# Patient Record
Sex: MALE | Race: OTHER | HISPANIC OR LATINO
[De-identification: names, ages, dates, MRNs, and addresses within clinical notes are randomized per-mention and may not be internally consistent; named-entity substitution may affect disease eponyms.]

---

## 2021-04-12 ENCOUNTER — APPOINTMENT (OUTPATIENT)
Dept: DISASTER EMERGENCY | Facility: OTHER | Age: 81
End: 2021-04-12
Payer: MEDICARE

## 2021-04-12 PROCEDURE — 0012A: CPT

## 2022-05-14 ENCOUNTER — EMERGENCY (EMERGENCY)
Facility: HOSPITAL | Age: 82
LOS: 1 days | Discharge: HOME | End: 2022-05-14
Attending: EMERGENCY MEDICINE | Admitting: EMERGENCY MEDICINE
Payer: MEDICARE

## 2022-05-14 VITALS — OXYGEN SATURATION: 99 % | RESPIRATION RATE: 19 BRPM | HEART RATE: 75 BPM

## 2022-05-14 VITALS
OXYGEN SATURATION: 98 % | HEIGHT: 71 IN | TEMPERATURE: 98 F | WEIGHT: 169.98 LBS | HEART RATE: 81 BPM | RESPIRATION RATE: 20 BRPM | SYSTOLIC BLOOD PRESSURE: 130 MMHG | DIASTOLIC BLOOD PRESSURE: 82 MMHG

## 2022-05-14 PROCEDURE — 99283 EMERGENCY DEPT VISIT LOW MDM: CPT

## 2022-05-14 RX ORDER — ALBUTEROL 90 UG/1
2 AEROSOL, METERED ORAL
Qty: 1 | Refills: 0
Start: 2022-05-14

## 2022-05-14 NOTE — ED ADULT TRIAGE NOTE - ISOLATION TYPE:
Airborne+Contact precautions/Droplet+Contact precautions Rifampin Pregnancy And Lactation Text: This medication is Pregnancy Category C and it isn't know if it is safe during pregnancy. It is also excreted in breast milk and should not be used if you are breast feeding.

## 2022-05-14 NOTE — ED PROVIDER NOTE - ATTENDING CONTRIBUTION TO CARE
Patient with Parkinson's, htn, dm, Paxlovid, here 2/2 wheezing and ran out of albuterol  will give albuterol  The patient was serially evaluated throughout emergency department course by the team. There was no acute deterioration up to this time in the emergency department. The patient has demonstrated clinical improvement and/or stability, feels better at this time according to emergency department team. Agree with goals/plan of emergency department care as described in this physician's electronic medical record, including diagnostics, therapeutics and consultation recommendation as clinically warranted. Will discharge home with close outpatient follow up with primary care physician/provider and specialist if necessary. The patient and/or family was educated on concerning signs and features to return to the emergency department, in layman terms, including but not limited to: nausea, vomiting, fever, chills, the inability to eat, take medications, or drink, persistent/worsening symptoms or any concerns at all. There are no acute or immediate life threatening issues present on history, clinical exam, or any diagnostic evaluation. The patient is a safe disposition home, has capacity and insight into their condition, is ambulatory in the Emergency Department with no further questions and will follow up with their doctor(s) this week. Diagnosis, prognosis, natural history and treatment was discussed with patient and/or family. The patient and/or family were given the opportunity to ask questions and have them answered in full. The patient and/or family are with capacity and insight into the situation, treatment, risks, benefits, alternative therapies, and understand that they can ask any further questions if needed. Patient and/or family/guardian understands anticipatory guidance and was given strict return and follow up precautions. The patient and/or family/guardian has been informed of the necessity to follow up with the PMD/Clinic/follow up as provided within 2-3 days, and the patient and/or family/guardian reports understanding of above with capacity and insight. The patient and/or family/guardian were informed of any results of their tests and are were encouraged to follow up on the findings with their doctor as well as the need to inform their doctor of any results. The patient and/or family/guardian are aware of the need to follow up with repeat testing as applicable and report understanding of the above with capacity and insight. The patient and/or family/guardian was made aware of any pending test results at the time of discharge and of the need to call back for the final results a well as the need to inform their doctor of the results.

## 2022-05-14 NOTE — ED ADULT NURSE NOTE - OBJECTIVE STATEMENT
82 yo M with pmhx of HTN, DM, Parkinson's, HLD recent dx with Covid prescribed Paxlovid on the last day, presents to the ED for increased SOB since Tuesday. Pt is a&ox4, in NAD on arrival. afebrile, sp02 98% on RA, respirations equal, regular, spontaneous.  family is at bedside translating, (also Covid+) denies chest pain, headache, nausea, vomiting, diarrhea, abdominal pain, fevers, chills, urinary symptoms, falls.

## 2022-05-14 NOTE — ED PROVIDER NOTE - NSFOLLOWUPINSTRUCTIONS_ED_ALL_ED_FT
Please take albuterol inhaler as instructed for wheeze. Complete course of paxlovid. Follow up with your PCP before resuming home medications such as your home simvastatin and lisinopril for definitive management.

## 2022-05-14 NOTE — ED PROVIDER NOTE - NS ED ROS FT
REVIEW OF SYSTEMS:    CONSTITUTIONAL: No weakness, fevers or chills  RESPIRATORY: +cough   CARDIOVASCULAR: No chest pain or palpitations  GASTROINTESTINAL: No abdominal or epigastric pain. No nausea, vomiting, or hematemesis; No diarrhea or constipation. No melena or hematochezia.  GENITOURINARY: No dysuria, frequency or hematuria  All other review of systems is negative unless indicated above.

## 2022-05-14 NOTE — ED PROVIDER NOTE - PATIENT PORTAL LINK FT
You can access the FollowMyHealth Patient Portal offered by [facility] by registering at the following website: [email or web address]. By joining [employer]’s FollowMyHealth portal, you will also be able to view your health information using other applications (apps) compatible with our system.

## 2022-05-14 NOTE — ED PROVIDER NOTE - PHYSICAL EXAMINATION
GENERAL: patient appears well, no acute distress, appropriate, pleasant  ENMT: oropharynx clear without erythema, no exudates, moist mucous membranes  LUNGS: good air entry bilaterally, clear to auscultation, symmetric breath sounds, no wheezing or rhonchi appreciated  HEART: soft S1/S2, regular rate and rhythm, no murmurs noted, no lower extremity edema  GASTROINTESTINAL: abdomen is soft, nontender, nondistended, normoactive bowel sounds, no palpable masses  NEUROLOGIC: awake, alert, oriented x3

## 2022-05-14 NOTE — ED PROVIDER NOTE - OBJECTIVE STATEMENT
81y M pmhx HTN HLD DM COVID infxn presenting for SOB and wheezing 5/14 AM on paxlovid to complete course 5/15. Eli 81y M pmhx HTN HLD DM COVID infxn presenting for SOB and wheezing 5/14 AM on paxlovid to complete course 5/15. Eli son provided translation at bedside. Interval improvement of wheezing and SOB upon arrival to ED. home pulse ox 98% on 5/13 and 5/14. Denies fever chills sweats no chest pain palp no pleuritic pain SOB improved with one episode cough while in ED. No other concerns.

## 2023-03-29 ENCOUNTER — EMERGENCY (EMERGENCY)
Facility: HOSPITAL | Age: 83
LOS: 1 days | Discharge: HOME | End: 2023-03-29
Attending: EMERGENCY MEDICINE
Payer: MEDICARE

## 2023-03-29 VITALS
TEMPERATURE: 98 F | WEIGHT: 179.9 LBS | DIASTOLIC BLOOD PRESSURE: 86 MMHG | HEIGHT: 66 IN | RESPIRATION RATE: 18 BRPM | HEART RATE: 86 BPM | SYSTOLIC BLOOD PRESSURE: 147 MMHG | OXYGEN SATURATION: 98 %

## 2023-03-29 PROCEDURE — 99284 EMERGENCY DEPT VISIT MOD MDM: CPT | Mod: GC

## 2023-03-30 PROBLEM — G20 PARKINSON'S DISEASE: Chronic | Status: ACTIVE | Noted: 2022-05-14

## 2023-03-30 PROCEDURE — 85025 COMPLETE CBC W/AUTO DIFF WBC: CPT

## 2023-03-30 PROCEDURE — 99285 EMERGENCY DEPT VISIT HI MDM: CPT | Mod: 25

## 2023-03-30 PROCEDURE — 93975 VASCULAR STUDY: CPT

## 2023-03-30 PROCEDURE — 81001 URINALYSIS AUTO W/SCOPE: CPT

## 2023-03-30 PROCEDURE — 76870 US EXAM SCROTUM: CPT

## 2023-03-30 PROCEDURE — 80053 COMPREHEN METABOLIC PANEL: CPT

## 2023-03-30 PROCEDURE — 76870 US EXAM SCROTUM: CPT | Mod: 26

## 2023-03-30 PROCEDURE — 87186 SC STD MICRODIL/AGAR DIL: CPT

## 2023-03-30 PROCEDURE — 93975 VASCULAR STUDY: CPT | Mod: 26

## 2023-03-30 PROCEDURE — 87086 URINE CULTURE/COLONY COUNT: CPT

## 2023-04-01 NOTE — ED POST DISCHARGE NOTE - DETAILS
4/1: patient with epididymitis, positive UA, unclear what abx taken. lvm to call back admin line. will continue to follow final sensitivities - Evelia Frederick PA-C 4/2 - Carmina Bentley PA-C 4/3: LVM for pt to return call to ED - Miriam Jay PA-C

## 2024-05-21 ENCOUNTER — INPATIENT (INPATIENT)
Facility: HOSPITAL | Age: 84
LOS: 6 days | Discharge: NURSING, REHAB OR LONG-TERM CARE | DRG: 871 | End: 2024-05-28
Attending: HOSPITALIST | Admitting: STUDENT IN AN ORGANIZED HEALTH CARE EDUCATION/TRAINING PROGRAM
Payer: MEDICARE

## 2024-05-21 VITALS
SYSTOLIC BLOOD PRESSURE: 146 MMHG | TEMPERATURE: 102 F | HEART RATE: 110 BPM | RESPIRATION RATE: 22 BRPM | DIASTOLIC BLOOD PRESSURE: 76 MMHG | OXYGEN SATURATION: 97 % | HEIGHT: 67 IN | WEIGHT: 179.9 LBS

## 2024-05-21 PROCEDURE — 99285 EMERGENCY DEPT VISIT HI MDM: CPT | Mod: GC

## 2024-05-21 RX ORDER — ACETAMINOPHEN 500 MG
1000 TABLET ORAL ONCE
Refills: 0 | Status: COMPLETED | OUTPATIENT
Start: 2024-05-21 | End: 2024-05-21

## 2024-05-21 RX ORDER — SODIUM CHLORIDE 9 MG/ML
1000 INJECTION INTRAMUSCULAR; INTRAVENOUS; SUBCUTANEOUS ONCE
Refills: 0 | Status: COMPLETED | OUTPATIENT
Start: 2024-05-21 | End: 2024-05-21

## 2024-05-22 DIAGNOSIS — N39.0 URINARY TRACT INFECTION, SITE NOT SPECIFIED: ICD-10-CM

## 2024-05-22 DIAGNOSIS — Z86.69 PERSONAL HISTORY OF OTHER DISEASES OF THE NERVOUS SYSTEM AND SENSE ORGANS: ICD-10-CM

## 2024-05-22 DIAGNOSIS — Z96.21 COCHLEAR IMPLANT STATUS: Chronic | ICD-10-CM

## 2024-05-22 DIAGNOSIS — I10 ESSENTIAL (PRIMARY) HYPERTENSION: ICD-10-CM

## 2024-05-22 DIAGNOSIS — Z29.9 ENCOUNTER FOR PROPHYLACTIC MEASURES, UNSPECIFIED: ICD-10-CM

## 2024-05-22 DIAGNOSIS — E11.9 TYPE 2 DIABETES MELLITUS WITHOUT COMPLICATIONS: ICD-10-CM

## 2024-05-22 DIAGNOSIS — A41.9 SEPSIS, UNSPECIFIED ORGANISM: ICD-10-CM

## 2024-05-22 LAB
ALBUMIN SERPL ELPH-MCNC: 4.2 G/DL — SIGNIFICANT CHANGE UP (ref 3.3–5)
ALP SERPL-CCNC: 110 U/L — SIGNIFICANT CHANGE UP (ref 40–120)
ALT FLD-CCNC: 6 U/L — LOW (ref 10–45)
ANION GAP SERPL CALC-SCNC: 14 MMOL/L — SIGNIFICANT CHANGE UP (ref 5–17)
APPEARANCE UR: ABNORMAL
APTT BLD: 27.9 SEC — SIGNIFICANT CHANGE UP (ref 24.5–35.6)
AST SERPL-CCNC: 11 U/L — SIGNIFICANT CHANGE UP (ref 10–40)
BACTERIA # UR AUTO: ABNORMAL /HPF
BASE EXCESS BLDV CALC-SCNC: -0.2 MMOL/L — SIGNIFICANT CHANGE UP (ref -2–3)
BASE EXCESS BLDV CALC-SCNC: -1.7 MMOL/L — SIGNIFICANT CHANGE UP (ref -2–3)
BASOPHILS # BLD AUTO: 0.08 K/UL — SIGNIFICANT CHANGE UP (ref 0–0.2)
BASOPHILS NFR BLD AUTO: 0.4 % — SIGNIFICANT CHANGE UP (ref 0–2)
BILIRUB SERPL-MCNC: 0.6 MG/DL — SIGNIFICANT CHANGE UP (ref 0.2–1.2)
BILIRUB UR-MCNC: NEGATIVE — SIGNIFICANT CHANGE UP
BUN SERPL-MCNC: 22 MG/DL — SIGNIFICANT CHANGE UP (ref 7–23)
CA-I SERPL-SCNC: 1.21 MMOL/L — SIGNIFICANT CHANGE UP (ref 1.15–1.33)
CA-I SERPL-SCNC: 1.24 MMOL/L — SIGNIFICANT CHANGE UP (ref 1.15–1.33)
CALCIUM SERPL-MCNC: 9.7 MG/DL — SIGNIFICANT CHANGE UP (ref 8.4–10.5)
CAST: 2 /LPF — SIGNIFICANT CHANGE UP (ref 0–4)
CHLORIDE BLDV-SCNC: 102 MMOL/L — SIGNIFICANT CHANGE UP (ref 96–108)
CHLORIDE BLDV-SCNC: 99 MMOL/L — SIGNIFICANT CHANGE UP (ref 96–108)
CHLORIDE SERPL-SCNC: 99 MMOL/L — SIGNIFICANT CHANGE UP (ref 96–108)
CO2 BLDV-SCNC: 24 MMOL/L — SIGNIFICANT CHANGE UP (ref 22–26)
CO2 BLDV-SCNC: 27 MMOL/L — HIGH (ref 22–26)
CO2 SERPL-SCNC: 24 MMOL/L — SIGNIFICANT CHANGE UP (ref 22–31)
COLOR SPEC: YELLOW — SIGNIFICANT CHANGE UP
CREAT SERPL-MCNC: 1.21 MG/DL — SIGNIFICANT CHANGE UP (ref 0.5–1.3)
DIFF PNL FLD: ABNORMAL
EGFR: 59 ML/MIN/1.73M2 — LOW
EOSINOPHIL # BLD AUTO: 0.04 K/UL — SIGNIFICANT CHANGE UP (ref 0–0.5)
EOSINOPHIL NFR BLD AUTO: 0.2 % — SIGNIFICANT CHANGE UP (ref 0–6)
GAS PNL BLDV: 131 MMOL/L — LOW (ref 136–145)
GAS PNL BLDV: 132 MMOL/L — LOW (ref 136–145)
GAS PNL BLDV: SIGNIFICANT CHANGE UP
GAS PNL BLDV: SIGNIFICANT CHANGE UP
GLUCOSE BLDC GLUCOMTR-MCNC: 103 MG/DL — HIGH (ref 70–99)
GLUCOSE BLDC GLUCOMTR-MCNC: 109 MG/DL — HIGH (ref 70–99)
GLUCOSE BLDV-MCNC: 121 MG/DL — HIGH (ref 70–99)
GLUCOSE BLDV-MCNC: 136 MG/DL — HIGH (ref 70–99)
GLUCOSE SERPL-MCNC: 124 MG/DL — HIGH (ref 70–99)
GLUCOSE UR QL: NEGATIVE MG/DL — SIGNIFICANT CHANGE UP
HCO3 BLDV-SCNC: 23 MMOL/L — SIGNIFICANT CHANGE UP (ref 22–29)
HCO3 BLDV-SCNC: 25 MMOL/L — SIGNIFICANT CHANGE UP (ref 22–29)
HCT VFR BLD CALC: 38.5 % — LOW (ref 39–50)
HCT VFR BLDA CALC: 33 % — LOW (ref 39–51)
HCT VFR BLDA CALC: 40 % — SIGNIFICANT CHANGE UP (ref 39–51)
HGB BLD CALC-MCNC: 11 G/DL — LOW (ref 12.6–17.4)
HGB BLD CALC-MCNC: 13.2 G/DL — SIGNIFICANT CHANGE UP (ref 12.6–17.4)
HGB BLD-MCNC: 12.7 G/DL — LOW (ref 13–17)
IMM GRANULOCYTES NFR BLD AUTO: 0.9 % — SIGNIFICANT CHANGE UP (ref 0–0.9)
INR BLD: 1.07 RATIO — SIGNIFICANT CHANGE UP (ref 0.85–1.18)
KETONES UR-MCNC: ABNORMAL MG/DL
LACTATE BLDV-MCNC: 1.3 MMOL/L — SIGNIFICANT CHANGE UP (ref 0.5–2)
LACTATE BLDV-MCNC: 2.6 MMOL/L — HIGH (ref 0.5–2)
LEUKOCYTE ESTERASE UR-ACNC: ABNORMAL
LYMPHOCYTES # BLD AUTO: 1.25 K/UL — SIGNIFICANT CHANGE UP (ref 1–3.3)
LYMPHOCYTES # BLD AUTO: 6.7 % — LOW (ref 13–44)
MCHC RBC-ENTMCNC: 30.6 PG — SIGNIFICANT CHANGE UP (ref 27–34)
MCHC RBC-ENTMCNC: 33 GM/DL — SIGNIFICANT CHANGE UP (ref 32–36)
MCV RBC AUTO: 92.8 FL — SIGNIFICANT CHANGE UP (ref 80–100)
MONOCYTES # BLD AUTO: 1.7 K/UL — HIGH (ref 0–0.9)
MONOCYTES NFR BLD AUTO: 9.1 % — SIGNIFICANT CHANGE UP (ref 2–14)
NEUTROPHILS # BLD AUTO: 15.53 K/UL — HIGH (ref 1.8–7.4)
NEUTROPHILS NFR BLD AUTO: 82.7 % — HIGH (ref 43–77)
NITRITE UR-MCNC: POSITIVE
NRBC # BLD: 0 /100 WBCS — SIGNIFICANT CHANGE UP (ref 0–0)
PCO2 BLDV: 38 MMHG — LOW (ref 42–55)
PCO2 BLDV: 44 MMHG — SIGNIFICANT CHANGE UP (ref 42–55)
PH BLDV: 7.37 — SIGNIFICANT CHANGE UP (ref 7.32–7.43)
PH BLDV: 7.39 — SIGNIFICANT CHANGE UP (ref 7.32–7.43)
PH UR: 7 — SIGNIFICANT CHANGE UP (ref 5–8)
PLATELET # BLD AUTO: 158 K/UL — SIGNIFICANT CHANGE UP (ref 150–400)
PO2 BLDV: 22 MMHG — LOW (ref 25–45)
PO2 BLDV: 43 MMHG — SIGNIFICANT CHANGE UP (ref 25–45)
POTASSIUM BLDV-SCNC: 3.7 MMOL/L — SIGNIFICANT CHANGE UP (ref 3.5–5.1)
POTASSIUM BLDV-SCNC: 4 MMOL/L — SIGNIFICANT CHANGE UP (ref 3.5–5.1)
POTASSIUM SERPL-MCNC: 3.9 MMOL/L — SIGNIFICANT CHANGE UP (ref 3.5–5.3)
POTASSIUM SERPL-SCNC: 3.9 MMOL/L — SIGNIFICANT CHANGE UP (ref 3.5–5.3)
PROT SERPL-MCNC: 7.8 G/DL — SIGNIFICANT CHANGE UP (ref 6–8.3)
PROT UR-MCNC: 30 MG/DL
PROTHROM AB SERPL-ACNC: 11.7 SEC — SIGNIFICANT CHANGE UP (ref 9.5–13)
RBC # BLD: 4.15 M/UL — LOW (ref 4.2–5.8)
RBC # FLD: 13.9 % — SIGNIFICANT CHANGE UP (ref 10.3–14.5)
RBC CASTS # UR COMP ASSIST: 2 /HPF — SIGNIFICANT CHANGE UP (ref 0–4)
SAO2 % BLDV: 32 % — LOW (ref 67–88)
SAO2 % BLDV: 75.3 % — SIGNIFICANT CHANGE UP (ref 67–88)
SODIUM SERPL-SCNC: 137 MMOL/L — SIGNIFICANT CHANGE UP (ref 135–145)
SP GR SPEC: 1.02 — SIGNIFICANT CHANGE UP (ref 1–1.03)
SQUAMOUS # UR AUTO: 1 /HPF — SIGNIFICANT CHANGE UP (ref 0–5)
UROBILINOGEN FLD QL: 1 MG/DL — SIGNIFICANT CHANGE UP (ref 0.2–1)
WBC # BLD: 18.76 K/UL — HIGH (ref 3.8–10.5)
WBC # FLD AUTO: 18.76 K/UL — HIGH (ref 3.8–10.5)
WBC UR QL: 195 /HPF — HIGH (ref 0–5)

## 2024-05-22 PROCEDURE — 99223 1ST HOSP IP/OBS HIGH 75: CPT

## 2024-05-22 PROCEDURE — 74177 CT ABD & PELVIS W/CONTRAST: CPT | Mod: 26,MC

## 2024-05-22 PROCEDURE — 93010 ELECTROCARDIOGRAM REPORT: CPT

## 2024-05-22 PROCEDURE — 70450 CT HEAD/BRAIN W/O DYE: CPT | Mod: 26,MC

## 2024-05-22 RX ORDER — METFORMIN HYDROCHLORIDE 850 MG/1
1 TABLET ORAL
Refills: 0 | DISCHARGE

## 2024-05-22 RX ORDER — SODIUM CHLORIDE 9 MG/ML
1000 INJECTION INTRAMUSCULAR; INTRAVENOUS; SUBCUTANEOUS ONCE
Refills: 0 | Status: COMPLETED | OUTPATIENT
Start: 2024-05-22 | End: 2024-05-22

## 2024-05-22 RX ORDER — DEXTROSE 10 % IN WATER 10 %
125 INTRAVENOUS SOLUTION INTRAVENOUS ONCE
Refills: 0 | Status: DISCONTINUED | OUTPATIENT
Start: 2024-05-22 | End: 2024-05-28

## 2024-05-22 RX ORDER — PIPERACILLIN AND TAZOBACTAM 4; .5 G/20ML; G/20ML
3.38 INJECTION, POWDER, LYOPHILIZED, FOR SOLUTION INTRAVENOUS EVERY 8 HOURS
Refills: 0 | Status: DISCONTINUED | OUTPATIENT
Start: 2024-05-22 | End: 2024-05-24

## 2024-05-22 RX ORDER — GLUCAGON INJECTION, SOLUTION 0.5 MG/.1ML
1 INJECTION, SOLUTION SUBCUTANEOUS ONCE
Refills: 0 | Status: DISCONTINUED | OUTPATIENT
Start: 2024-05-22 | End: 2024-05-28

## 2024-05-22 RX ORDER — ONDANSETRON 8 MG/1
4 TABLET, FILM COATED ORAL EVERY 8 HOURS
Refills: 0 | Status: DISCONTINUED | OUTPATIENT
Start: 2024-05-22 | End: 2024-05-28

## 2024-05-22 RX ORDER — DORZOLAMIDE HYDROCHLORIDE TIMOLOL MALEATE 20; 5 MG/ML; MG/ML
1 SOLUTION/ DROPS OPHTHALMIC
Refills: 0 | DISCHARGE

## 2024-05-22 RX ORDER — LISINOPRIL 2.5 MG/1
1 TABLET ORAL
Refills: 0 | DISCHARGE

## 2024-05-22 RX ORDER — SIMVASTATIN 20 MG/1
40 TABLET, FILM COATED ORAL AT BEDTIME
Refills: 0 | Status: DISCONTINUED | OUTPATIENT
Start: 2024-05-22 | End: 2024-05-28

## 2024-05-22 RX ORDER — LATANOPROST 0.05 MG/ML
1 SOLUTION/ DROPS OPHTHALMIC; TOPICAL AT BEDTIME
Refills: 0 | Status: DISCONTINUED | OUTPATIENT
Start: 2024-05-22 | End: 2024-05-28

## 2024-05-22 RX ORDER — PIPERACILLIN AND TAZOBACTAM 4; .5 G/20ML; G/20ML
3.38 INJECTION, POWDER, LYOPHILIZED, FOR SOLUTION INTRAVENOUS ONCE
Refills: 0 | Status: COMPLETED | OUTPATIENT
Start: 2024-05-22 | End: 2024-05-22

## 2024-05-22 RX ORDER — ACETAMINOPHEN 500 MG
650 TABLET ORAL EVERY 6 HOURS
Refills: 0 | Status: DISCONTINUED | OUTPATIENT
Start: 2024-05-22 | End: 2024-05-28

## 2024-05-22 RX ORDER — TAMSULOSIN HYDROCHLORIDE 0.4 MG/1
1 CAPSULE ORAL
Refills: 0 | DISCHARGE

## 2024-05-22 RX ORDER — INSULIN LISPRO 100/ML
VIAL (ML) SUBCUTANEOUS AT BEDTIME
Refills: 0 | Status: DISCONTINUED | OUTPATIENT
Start: 2024-05-22 | End: 2024-05-28

## 2024-05-22 RX ORDER — TAMSULOSIN HYDROCHLORIDE 0.4 MG/1
0.4 CAPSULE ORAL AT BEDTIME
Refills: 0 | Status: DISCONTINUED | OUTPATIENT
Start: 2024-05-22 | End: 2024-05-28

## 2024-05-22 RX ORDER — DEXTROSE 50 % IN WATER 50 %
15 SYRINGE (ML) INTRAVENOUS ONCE
Refills: 0 | Status: DISCONTINUED | OUTPATIENT
Start: 2024-05-22 | End: 2024-05-28

## 2024-05-22 RX ORDER — DORZOLAMIDE HYDROCHLORIDE TIMOLOL MALEATE 20; 5 MG/ML; MG/ML
1 SOLUTION/ DROPS OPHTHALMIC
Refills: 0 | Status: DISCONTINUED | OUTPATIENT
Start: 2024-05-22 | End: 2024-05-28

## 2024-05-22 RX ORDER — DEXTROSE 50 % IN WATER 50 %
25 SYRINGE (ML) INTRAVENOUS ONCE
Refills: 0 | Status: DISCONTINUED | OUTPATIENT
Start: 2024-05-22 | End: 2024-05-28

## 2024-05-22 RX ORDER — INSULIN LISPRO 100/ML
VIAL (ML) SUBCUTANEOUS
Refills: 0 | Status: DISCONTINUED | OUTPATIENT
Start: 2024-05-22 | End: 2024-05-28

## 2024-05-22 RX ORDER — SIMVASTATIN 20 MG/1
1 TABLET, FILM COATED ORAL
Refills: 0 | DISCHARGE

## 2024-05-22 RX ORDER — PIPERACILLIN AND TAZOBACTAM 4; .5 G/20ML; G/20ML
3.38 INJECTION, POWDER, LYOPHILIZED, FOR SOLUTION INTRAVENOUS EVERY 8 HOURS
Refills: 0 | Status: DISCONTINUED | OUTPATIENT
Start: 2024-05-22 | End: 2024-05-22

## 2024-05-22 RX ORDER — VANCOMYCIN HCL 1 G
1000 VIAL (EA) INTRAVENOUS ONCE
Refills: 0 | Status: COMPLETED | OUTPATIENT
Start: 2024-05-22 | End: 2024-05-22

## 2024-05-22 RX ORDER — POLYETHYLENE GLYCOL 3350 17 G/17G
17 POWDER, FOR SOLUTION ORAL DAILY
Refills: 0 | Status: DISCONTINUED | OUTPATIENT
Start: 2024-05-22 | End: 2024-05-28

## 2024-05-22 RX ORDER — SODIUM CHLORIDE 9 MG/ML
1000 INJECTION, SOLUTION INTRAVENOUS
Refills: 0 | Status: DISCONTINUED | OUTPATIENT
Start: 2024-05-22 | End: 2024-05-28

## 2024-05-22 RX ORDER — TRAZODONE HCL 50 MG
50 TABLET ORAL AT BEDTIME
Refills: 0 | Status: DISCONTINUED | OUTPATIENT
Start: 2024-05-22 | End: 2024-05-28

## 2024-05-22 RX ORDER — LANOLIN ALCOHOL/MO/W.PET/CERES
3 CREAM (GRAM) TOPICAL AT BEDTIME
Refills: 0 | Status: DISCONTINUED | OUTPATIENT
Start: 2024-05-22 | End: 2024-05-28

## 2024-05-22 RX ORDER — CARBIDOPA AND LEVODOPA 25; 100 MG/1; MG/1
1 TABLET ORAL
Refills: 0 | DISCHARGE

## 2024-05-22 RX ORDER — CARBIDOPA AND LEVODOPA 25; 100 MG/1; MG/1
1 TABLET ORAL THREE TIMES A DAY
Refills: 0 | Status: DISCONTINUED | OUTPATIENT
Start: 2024-05-22 | End: 2024-05-28

## 2024-05-22 RX ORDER — DEXTROSE 50 % IN WATER 50 %
12.5 SYRINGE (ML) INTRAVENOUS ONCE
Refills: 0 | Status: DISCONTINUED | OUTPATIENT
Start: 2024-05-22 | End: 2024-05-28

## 2024-05-22 RX ORDER — TRAZODONE HCL 50 MG
1 TABLET ORAL
Refills: 0 | DISCHARGE

## 2024-05-22 RX ORDER — LATANOPROST 0.05 MG/ML
1 SOLUTION/ DROPS OPHTHALMIC; TOPICAL
Refills: 0 | DISCHARGE

## 2024-05-22 RX ADMIN — Medication 400 MILLIGRAM(S): at 00:37

## 2024-05-22 RX ADMIN — PIPERACILLIN AND TAZOBACTAM 25 GRAM(S): 4; .5 INJECTION, POWDER, LYOPHILIZED, FOR SOLUTION INTRAVENOUS at 22:24

## 2024-05-22 RX ADMIN — TAMSULOSIN HYDROCHLORIDE 0.4 MILLIGRAM(S): 0.4 CAPSULE ORAL at 22:22

## 2024-05-22 RX ADMIN — Medication 250 MILLIGRAM(S): at 03:05

## 2024-05-22 RX ADMIN — Medication 650 MILLIGRAM(S): at 16:25

## 2024-05-22 RX ADMIN — PIPERACILLIN AND TAZOBACTAM 200 GRAM(S): 4; .5 INJECTION, POWDER, LYOPHILIZED, FOR SOLUTION INTRAVENOUS at 01:21

## 2024-05-22 RX ADMIN — SODIUM CHLORIDE 1000 MILLILITER(S): 9 INJECTION INTRAMUSCULAR; INTRAVENOUS; SUBCUTANEOUS at 00:40

## 2024-05-22 RX ADMIN — CARBIDOPA AND LEVODOPA 1 TABLET(S): 25; 100 TABLET ORAL at 17:06

## 2024-05-22 RX ADMIN — PIPERACILLIN AND TAZOBACTAM 200 GRAM(S): 4; .5 INJECTION, POWDER, LYOPHILIZED, FOR SOLUTION INTRAVENOUS at 11:15

## 2024-05-22 RX ADMIN — SIMVASTATIN 40 MILLIGRAM(S): 20 TABLET, FILM COATED ORAL at 22:22

## 2024-05-22 RX ADMIN — Medication 50 MILLIGRAM(S): at 22:22

## 2024-05-22 RX ADMIN — CARBIDOPA AND LEVODOPA 1 TABLET(S): 25; 100 TABLET ORAL at 22:22

## 2024-05-22 RX ADMIN — DORZOLAMIDE HYDROCHLORIDE TIMOLOL MALEATE 1 DROP(S): 20; 5 SOLUTION/ DROPS OPHTHALMIC at 18:30

## 2024-05-22 RX ADMIN — SODIUM CHLORIDE 1000 MILLILITER(S): 9 INJECTION INTRAMUSCULAR; INTRAVENOUS; SUBCUTANEOUS at 03:05

## 2024-05-22 RX ADMIN — CARBIDOPA AND LEVODOPA 1 TABLET(S): 25; 100 TABLET ORAL at 11:14

## 2024-05-22 RX ADMIN — PIPERACILLIN AND TAZOBACTAM 25 GRAM(S): 4; .5 INJECTION, POWDER, LYOPHILIZED, FOR SOLUTION INTRAVENOUS at 16:11

## 2024-05-22 NOTE — H&P ADULT - PROBLEM SELECTOR PLAN 1
Patient meets severe sepsis criteria: WBC, Fever, Potential source and EOD (encephalopathy)  -failed oral cephalosporin, will conitnue with zoysn for pseudomonas and anaerobic coverage.  -Maintain MAP>65, no need to bolus given SBP>90  -follow up BCx, UCx.  -once mental status improves, dicsusse with patient if he is still able to straight cath (difficulties per family from Parkinson) vs. schumacher vs. suprapubic catheter.

## 2024-05-22 NOTE — H&P ADULT - HISTORY OF PRESENT ILLNESS
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 Patient had decreased ability to urinate on Friday, went ot an urgent care center, diagnosed with a UTI and given cefpodoxime. Patient did not improve, became encephalopathic, not recognizing family members, had fever at home and then fell from couch position. No chest pain,sOB, cough, rhinorrhea, abdominal pain, n/v/c/d. came to ED for further evaluation.   In ED given Zosyn and amditted for further monitoring.   Per son, patient's mental status is improving but not at baseline, thought it was 1966 when we were in room together. Patient only complaint is need for frequent urination, unable to void and requesting a schumacher catheter.

## 2024-05-22 NOTE — ED ADULT NURSE NOTE - OBJECTIVE STATEMENT
83 y.o [place]arag[place] speaking A&Ox3 w/ PMH of Parkinsons, DM, HTN, HLD presents to ED complaining of fever and AMS. pt was diagnosed with UTI at urgent care 3 days ago and started on oral antibiotics. Per son at bedside, pt has been increasingly lethargic and increasingly confused. Per son, pt had unwitnessed fall the other day as well. Per the son, pt catheterizes himself for urine and "has been doing it too frequently." Pt is not at baseline mental status per the son.

## 2024-05-22 NOTE — H&P ADULT - NSHPADDITIONALINFOADULT_GEN_ALL_CORE
#Advance dcare planning.  -per son at bedside,  patient's daughter is HCP, will attmept to reach out to determine GOC/MOLST.

## 2024-05-22 NOTE — ED ADULT NURSE REASSESSMENT NOTE - NS ED NURSE REASSESS COMMENT FT1
Indwelling Leavitt catheter placed as per MD order; 2 RNs at bedside during insertion; sterile technique utilized and maintained. Catheter securement device placed, catheter draining to gravity, 520mL cloudy yellow blood-tinged urine drained. Pt tolerated well.

## 2024-05-22 NOTE — ED PROVIDER NOTE - OBJECTIVE STATEMENT
83-year-old male history of Parkinson's, diabetes, hypertension, hyperlipidemia presenting with concerns of fever and altered mental status.  Patient was diagnosed with a UTI at urgent care outpatient 3 days ago and started on oral antibiotics, cefpodoxime.  Son states patient is compliant with medicine however has been more lethargic.  States he had a unwitnessed fall at home landing on the couch.  Unsure if he hit his head.  Denies any nausea, vomiting, diarrhea, URI symptoms.  States patient self catheterizes for urinary retention. 83-year-old male history of Parkinson's, diabetes, hypertension, hyperlipidemia presenting with concerns of fever and altered mental status.  Patient was diagnosed with a UTI at urgent care outpatient 3 days ago and started on oral antibiotics, cefpodoxime.  Son states patient is compliant with medicine however has been more lethargic.  States he had a unwitnessed fall at home landing on the couch.  Unsure if he hit his head.  Denies any nausea, vomiting, diarrhea, URI symptoms.  States patient self catheterizes for urinary retention.    PMD: Malena Miguel - Weil Cornell

## 2024-05-22 NOTE — H&P ADULT - NSHPLABSRESULTS_GEN_ALL_CORE
12.7   18.76 )-----------( 158      ( 22 May 2024 00:29 )             38.5       137  |  99  |  22  ----------------------------<  124<H>  3.9   |  24  |  1.21    Ca    9.7      22 May 2024 00:29    TPro  7.8  /  Alb  4.2  /  TBili  0.6  /  DBili  x   /  AST  11  /  ALT  6<L>  /  AlkPhos  110      < from: CT Abdomen and Pelvis w/ IV Cont (24 @ 01:46) >    Urinalysis Basic - ( 22 May 2024 02:19 )    Color: Yellow / Appearance: Cloudy / S.021 / pH: x  Gluc: x / Ketone: Trace mg/dL  / Bili: Negative / Urobili: 1.0 mg/dL   Blood: x / Protein: 30 mg/dL / Nitrite: Positive   Leuk Esterase: Moderate / RBC: 2 /HPF /  /HPF   Sq Epi: x / Non Sq Epi: 1 /HPF / Bacteria: Few /HPF      IMPRESSION:  Mild bladder wall thickening could be related to chronic outlet   obstruction given the enlarged prostate. Cystitis should be excluded on a   clinical basis and correlated with urinalysis.    Contracted gallbladder containing gallstone. No pericholecystic   inflammatory changes. No acute bowel findings.    < end of copied text >    ECG personally reviewed: Sinus tachycardia. Q wave in III, TWI V6. No other ST segment changes.

## 2024-05-22 NOTE — H&P ADULT - NSHPPHYSICALEXAM_GEN_ALL_CORE
Vital Signs Last 24 Hrs  T(C): 37.1 (22 May 2024 08:30), Max: 38.9 (21 May 2024 23:40)  T(F): 98.8 (22 May 2024 08:30), Max: 102.1 (21 May 2024 23:40)  HR: 108 (22 May 2024 08:30) (61 - 110)  BP: 155/82 (22 May 2024 08:30) (139/70 - 155/82)  BP(mean): 101 (22 May 2024 08:30) (101 - 101)  RR: 16 (22 May 2024 08:30) (16 - 22)  SpO2: 100% (22 May 2024 08:30) (97% - 100%)    Parameters below as of 22 May 2024 08:30  Patient On (Oxygen Delivery Method): room air

## 2024-05-22 NOTE — ED PROVIDER NOTE - CLINICAL SUMMARY MEDICAL DECISION MAKING FREE TEXT BOX
Astrid: 83 year old male self-caths, diagnosed with UTI four days ago started on oral abx, increase confusion and lethargy. also wit abdominal discomfort and constipation. PE: att exam: patient somnolent but arousable, nad,. LUNGS CTAB no wheeze no crackle. CARD RRR no m/r/g.  Abdomen soft mild ttp lower abdomnen mild distention. EXT WWP no edema no calf tenderness CV 2+DP/PT bilaterally. neuro no focal deficits, moving all extremities.   plan: 83 year old male with increase confusion in setting of recent uti, self caths. will get labs, cultures. r/o sepsis. jaspreet get ct head r/o other causes of ams, ct a/p r/o worsening uti versus pyelo vresus intrabdominal infeciton. reasess

## 2024-05-22 NOTE — ED ADULT NURSE REASSESSMENT NOTE - NS ED NURSE REASSESS COMMENT FT1
Pt bladder scanned - retaining 485 mL. Pt requesting to have schumacher catheter placed. BRISSA Merida made aware.

## 2024-05-23 LAB
A1C WITH ESTIMATED AVERAGE GLUCOSE RESULT: 5.8 % — HIGH (ref 4–5.6)
ANION GAP SERPL CALC-SCNC: 15 MMOL/L — SIGNIFICANT CHANGE UP (ref 5–17)
BUN SERPL-MCNC: 20 MG/DL — SIGNIFICANT CHANGE UP (ref 7–23)
CALCIUM SERPL-MCNC: 8.7 MG/DL — SIGNIFICANT CHANGE UP (ref 8.4–10.5)
CHLORIDE SERPL-SCNC: 102 MMOL/L — SIGNIFICANT CHANGE UP (ref 96–108)
CO2 SERPL-SCNC: 17 MMOL/L — LOW (ref 22–31)
CREAT SERPL-MCNC: 1.09 MG/DL — SIGNIFICANT CHANGE UP (ref 0.5–1.3)
EGFR: 67 ML/MIN/1.73M2 — SIGNIFICANT CHANGE UP
ESTIMATED AVERAGE GLUCOSE: 120 MG/DL — HIGH (ref 68–114)
GLUCOSE BLDC GLUCOMTR-MCNC: 109 MG/DL — HIGH (ref 70–99)
GLUCOSE BLDC GLUCOMTR-MCNC: 113 MG/DL — HIGH (ref 70–99)
GLUCOSE BLDC GLUCOMTR-MCNC: 128 MG/DL — HIGH (ref 70–99)
GLUCOSE BLDC GLUCOMTR-MCNC: 130 MG/DL — HIGH (ref 70–99)
GLUCOSE SERPL-MCNC: 98 MG/DL — SIGNIFICANT CHANGE UP (ref 70–99)
HCT VFR BLD CALC: 34.9 % — LOW (ref 39–50)
HGB BLD-MCNC: 11.7 G/DL — LOW (ref 13–17)
MCHC RBC-ENTMCNC: 30.9 PG — SIGNIFICANT CHANGE UP (ref 27–34)
MCHC RBC-ENTMCNC: 33.5 GM/DL — SIGNIFICANT CHANGE UP (ref 32–36)
MCV RBC AUTO: 92.1 FL — SIGNIFICANT CHANGE UP (ref 80–100)
MRSA PCR RESULT.: SIGNIFICANT CHANGE UP
NRBC # BLD: 0 /100 WBCS — SIGNIFICANT CHANGE UP (ref 0–0)
PLATELET # BLD AUTO: 123 K/UL — LOW (ref 150–400)
POTASSIUM SERPL-MCNC: 3.7 MMOL/L — SIGNIFICANT CHANGE UP (ref 3.5–5.3)
POTASSIUM SERPL-SCNC: 3.7 MMOL/L — SIGNIFICANT CHANGE UP (ref 3.5–5.3)
RBC # BLD: 3.79 M/UL — LOW (ref 4.2–5.8)
RBC # FLD: 14.2 % — SIGNIFICANT CHANGE UP (ref 10.3–14.5)
S AUREUS DNA NOSE QL NAA+PROBE: SIGNIFICANT CHANGE UP
SODIUM SERPL-SCNC: 134 MMOL/L — LOW (ref 135–145)
WBC # BLD: 19.53 K/UL — HIGH (ref 3.8–10.5)
WBC # FLD AUTO: 19.53 K/UL — HIGH (ref 3.8–10.5)

## 2024-05-23 PROCEDURE — 99232 SBSQ HOSP IP/OBS MODERATE 35: CPT | Mod: GC

## 2024-05-23 RX ORDER — CHLORHEXIDINE GLUCONATE 213 G/1000ML
1 SOLUTION TOPICAL DAILY
Refills: 0 | Status: DISCONTINUED | OUTPATIENT
Start: 2024-05-23 | End: 2024-05-28

## 2024-05-23 RX ADMIN — Medication 50 MILLIGRAM(S): at 22:00

## 2024-05-23 RX ADMIN — DORZOLAMIDE HYDROCHLORIDE TIMOLOL MALEATE 1 DROP(S): 20; 5 SOLUTION/ DROPS OPHTHALMIC at 05:21

## 2024-05-23 RX ADMIN — CARBIDOPA AND LEVODOPA 1 TABLET(S): 25; 100 TABLET ORAL at 13:18

## 2024-05-23 RX ADMIN — PIPERACILLIN AND TAZOBACTAM 25 GRAM(S): 4; .5 INJECTION, POWDER, LYOPHILIZED, FOR SOLUTION INTRAVENOUS at 05:21

## 2024-05-23 RX ADMIN — SIMVASTATIN 40 MILLIGRAM(S): 20 TABLET, FILM COATED ORAL at 21:59

## 2024-05-23 RX ADMIN — CARBIDOPA AND LEVODOPA 1 TABLET(S): 25; 100 TABLET ORAL at 05:13

## 2024-05-23 RX ADMIN — Medication 3 MILLIGRAM(S): at 21:58

## 2024-05-23 RX ADMIN — TAMSULOSIN HYDROCHLORIDE 0.4 MILLIGRAM(S): 0.4 CAPSULE ORAL at 22:00

## 2024-05-23 RX ADMIN — DORZOLAMIDE HYDROCHLORIDE TIMOLOL MALEATE 1 DROP(S): 20; 5 SOLUTION/ DROPS OPHTHALMIC at 17:05

## 2024-05-23 RX ADMIN — CHLORHEXIDINE GLUCONATE 1 APPLICATION(S): 213 SOLUTION TOPICAL at 11:09

## 2024-05-23 RX ADMIN — PIPERACILLIN AND TAZOBACTAM 25 GRAM(S): 4; .5 INJECTION, POWDER, LYOPHILIZED, FOR SOLUTION INTRAVENOUS at 22:00

## 2024-05-23 RX ADMIN — CARBIDOPA AND LEVODOPA 1 TABLET(S): 25; 100 TABLET ORAL at 22:01

## 2024-05-23 RX ADMIN — PIPERACILLIN AND TAZOBACTAM 25 GRAM(S): 4; .5 INJECTION, POWDER, LYOPHILIZED, FOR SOLUTION INTRAVENOUS at 13:16

## 2024-05-23 RX ADMIN — LATANOPROST 1 DROP(S): 0.05 SOLUTION/ DROPS OPHTHALMIC; TOPICAL at 22:01

## 2024-05-23 NOTE — DIETITIAN INITIAL EVALUATION ADULT - NSFNSADHERENCEPTAFT_GEN_A_CORE
Hx of DM per chart, home metformin noted in H&P. Latest A1C 5.8% (5/23) indicates good glycemic control, POCTs in-house WDL. Ordered for sliding scale insulin for coverage in-house.

## 2024-05-23 NOTE — DIETITIAN INITIAL EVALUATION ADULT - ORAL INTAKE PTA/DIET HISTORY
Unable to obtain subjective diet hx from pt  - NKFA per chart  - no vitamins/supplements reported per home medications   - no hx of dysphagia per chart, pt noted downgraded from regular diet to soft and bite sized as of 5/23 s/p lunch

## 2024-05-23 NOTE — PHYSICAL THERAPY INITIAL EVALUATION ADULT - NSPTDISCHREC_GEN_A_CORE
RADHAMES; If pt d/c home, pt would require Home PT and assist/supervision with ALL functional mobility and ADLs./Sub-acute Rehab

## 2024-05-23 NOTE — PHYSICAL THERAPY INITIAL EVALUATION ADULT - PERTINENT HX OF CURRENT PROBLEM, REHAB EVAL
Pt is a 83-year-old male history of Parkinson's, diabetes, hypertension, hyperlipidemia presenting with concerns of fever and altered mental status.  Patient was diagnosed with a UTI at urgent care outpatient 3 days ago and started on oral antibiotics, cefpodoxime.  Son states patient is compliant with medicine however has been more lethargic.  States he had a unwitnessed fall at home landing on the couch.    CTH: Limited study due to extensive streak artifact. No gross acute intracranial hemorrhage, vasogenic edema or extra-axial collection. Mild chronic microvascular changes. 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  CT A/P: Mild bladder wall thickening could be related to chronic outlet obstruction given the enlarged prostate. Cystitis should be excluded on a clinical basis and correlated with urinalysis. Contracted gallbladder containing gallstone. No pericholecystic inflammatory changes. No acute bowel findings.

## 2024-05-23 NOTE — PHYSICAL THERAPY INITIAL EVALUATION ADULT - GENERAL OBSERVATIONS, REHAB EVAL
pt received semi-supine in bed, A&0x2, following 50% simple commands, +IV, +schumacher, Faroese speaking, unable to follow commands from

## 2024-05-23 NOTE — DIETITIAN INITIAL EVALUATION ADULT - ORAL NUTRITION SUPPLEMENTS
Pt stated he was recently in to see Chandni MOLINA for redness and swelling in his legs. Pt stated he took the last dose of the abx this morning and his legs are better but not great.  Swelling and redness have come down some and pt wants to know if he s RD to add diet mighty shakes 2x/day (200kcal, 7g pro each)

## 2024-05-23 NOTE — PROGRESS NOTE ADULT - SUBJECTIVE AND OBJECTIVE BOX
*******************************  Rogerio Stone MD (PGY-1)  Internal Medicine  Contact via [employer] TEAMS  *******************************    PROGRESS NOTE:     Patient is a 83y old  Male who presents with a chief complaint of fall, encephalopathy (22 May 2024 10:30)      INTERVAL EVENTS: No acute overnight events.     SUBJECTIVE: Patient seen and examined at bedside. This morning, the patient is comfortable and doing well. No acute complaints. Denies fevers, chills, N/V/D, chest pain, SOB, abdominal pain.    MEDICATIONS  (STANDING):  carbidopa/levodopa  25/100 1 Tablet(s) Oral three times a day  dextrose 10% Bolus 125 milliLiter(s) IV Bolus once  dextrose 5%. 1000 milliLiter(s) (100 mL/Hr) IV Continuous <Continuous>  dextrose 5%. 1000 milliLiter(s) (50 mL/Hr) IV Continuous <Continuous>  dextrose 50% Injectable 25 Gram(s) IV Push once  dextrose 50% Injectable 12.5 Gram(s) IV Push once  dorzolamide 2%/timolol 0.5% Ophthalmic Solution 1 Drop(s) Both EYES two times a day  glucagon  Injectable 1 milliGRAM(s) IntraMuscular once  insulin lispro (ADMELOG) corrective regimen sliding scale   SubCutaneous three times a day before meals  insulin lispro (ADMELOG) corrective regimen sliding scale   SubCutaneous at bedtime  latanoprost 0.005% Ophthalmic Solution 1 Drop(s) Both EYES at bedtime  piperacillin/tazobactam IVPB.. 3.375 Gram(s) IV Intermittent every 8 hours  polyethylene glycol 3350 17 Gram(s) Oral daily  simvastatin 40 milliGRAM(s) Oral at bedtime  tamsulosin 0.4 milliGRAM(s) Oral at bedtime  traZODone 50 milliGRAM(s) Oral at bedtime    MEDICATIONS  (PRN):  acetaminophen     Tablet .. 650 milliGRAM(s) Oral every 6 hours PRN Temp greater or equal to 38C (100.4F), Mild Pain (1 - 3)  aluminum hydroxide/magnesium hydroxide/simethicone Suspension 30 milliLiter(s) Oral every 4 hours PRN Dyspepsia  dextrose Oral Gel 15 Gram(s) Oral once PRN Blood Glucose LESS THAN 70 milliGRAM(s)/deciliter  melatonin 3 milliGRAM(s) Oral at bedtime PRN Insomnia  ondansetron Injectable 4 milliGRAM(s) IV Push every 8 hours PRN Nausea and/or Vomiting      CAPILLARY BLOOD GLUCOSE      POCT Blood Glucose.: 109 mg/dL (22 May 2024 21:58)  POCT Blood Glucose.: 103 mg/dL (22 May 2024 16:08)    I&O's Summary    22 May 2024 07:01  -  23 May 2024 07:00  --------------------------------------------------------  IN: 0 mL / OUT: 1550 mL / NET: -1550 mL        PHYSICAL EXAM:  Vital Signs Last 24 Hrs  T(C): 37.2 (23 May 2024 05:30), Max: 38.4 (22 May 2024 16:25)  T(F): 99 (23 May 2024 05:30), Max: 101.1 (22 May 2024 16:25)  HR: 98 (23 May 2024 05:30) (91 - 109)  BP: 163/79 (23 May 2024 05:30) (108/64 - 163/79)  BP(mean): 105 (22 May 2024 17:10) (99 - 105)  RR: 18 (23 May 2024 05:30) (16 - 18)  SpO2: 97% (23 May 2024 05:30) (95% - 100%)    Parameters below as of 23 May 2024 05:30  Patient On (Oxygen Delivery Method): room air        GENERAL: NAD, lying in bed comfortably  HEAD: Atraumatic, normocephalic  EYES: EOMI, PERRLA  ENT: Moist mucous membranes  NECK: Supple, no JVD  HEART: S1, S2, Regular rate and rhythm, no murmurs, rubs, or gallops  LUNGS: Unlabored respirations, clear to auscultation bilaterally, no crackles, wheezing, or rhonchi  ABDOMEN: Soft, nontender, nondistended, +BS  EXTREMITIES: No clubbing, cyanosis, or edema  NERVOUS SYSTEM:  A&Ox3, no focal deficits   SKIN: No rashes or lesions    LABS:                        12.7   18.76 )-----------( 158      ( 22 May 2024 00:29 )             38.5         137  |  99  |  22  ----------------------------<  124<H>  3.9   |  24  |  1.21    Ca    9.7      22 May 2024 00:29    TPro  7.8  /  Alb  4.2  /  TBili  0.6  /  DBili  x   /  AST  11  /  ALT  6<L>  /  AlkPhos  110      PT/INR - ( 22 May 2024 00:29 )   PT: 11.7 sec;   INR: 1.07 ratio         PTT - ( 22 May 2024 00:29 )  PTT:27.9 sec      Urinalysis Basic - ( 22 May 2024 02:19 )    Color: Yellow / Appearance: Cloudy / S.021 / pH: x  Gluc: x / Ketone: Trace mg/dL  / Bili: Negative / Urobili: 1.0 mg/dL   Blood: x / Protein: 30 mg/dL / Nitrite: Positive   Leuk Esterase: Moderate / RBC: 2 /HPF /  /HPF   Sq Epi: x / Non Sq Epi: 1 /HPF / Bacteria: Few /HPF        Culture - Blood (collected 21 May 2024 23:30)  Source: .Blood Blood-Peripheral  Preliminary Report (23 May 2024 04:02):    No growth at 24 hours        RADIOLOGY & ADDITIONAL TESTS:  Results Reviewed:   Imaging Personally Reviewed:  Electrocardiogram Personally Reviewed:  Tele: *******************************  Rogerio Stone MD (PGY-1)  Internal Medicine  Contact via [employer] TEAMS  *******************************    PROGRESS NOTE:     Patient is a 83y old  Male who presents with a chief complaint of fall, encephalopathy (22 May 2024 10:30)      INTERVAL EVENTS: No acute overnight events.     SUBJECTIVE: Patient seen and examined at bedside. W/ dysuria. Denies fevers, chills, N/V/D, chest pain, SOB, abdominal pain.    MEDICATIONS  (STANDING):  carbidopa/levodopa  25/100 1 Tablet(s) Oral three times a day  dextrose 10% Bolus 125 milliLiter(s) IV Bolus once  dextrose 5%. 1000 milliLiter(s) (100 mL/Hr) IV Continuous <Continuous>  dextrose 5%. 1000 milliLiter(s) (50 mL/Hr) IV Continuous <Continuous>  dextrose 50% Injectable 25 Gram(s) IV Push once  dextrose 50% Injectable 12.5 Gram(s) IV Push once  dorzolamide 2%/timolol 0.5% Ophthalmic Solution 1 Drop(s) Both EYES two times a day  glucagon  Injectable 1 milliGRAM(s) IntraMuscular once  insulin lispro (ADMELOG) corrective regimen sliding scale   SubCutaneous three times a day before meals  insulin lispro (ADMELOG) corrective regimen sliding scale   SubCutaneous at bedtime  latanoprost 0.005% Ophthalmic Solution 1 Drop(s) Both EYES at bedtime  piperacillin/tazobactam IVPB.. 3.375 Gram(s) IV Intermittent every 8 hours  polyethylene glycol 3350 17 Gram(s) Oral daily  simvastatin 40 milliGRAM(s) Oral at bedtime  tamsulosin 0.4 milliGRAM(s) Oral at bedtime  traZODone 50 milliGRAM(s) Oral at bedtime    MEDICATIONS  (PRN):  acetaminophen     Tablet .. 650 milliGRAM(s) Oral every 6 hours PRN Temp greater or equal to 38C (100.4F), Mild Pain (1 - 3)  aluminum hydroxide/magnesium hydroxide/simethicone Suspension 30 milliLiter(s) Oral every 4 hours PRN Dyspepsia  dextrose Oral Gel 15 Gram(s) Oral once PRN Blood Glucose LESS THAN 70 milliGRAM(s)/deciliter  melatonin 3 milliGRAM(s) Oral at bedtime PRN Insomnia  ondansetron Injectable 4 milliGRAM(s) IV Push every 8 hours PRN Nausea and/or Vomiting      CAPILLARY BLOOD GLUCOSE      POCT Blood Glucose.: 109 mg/dL (22 May 2024 21:58)  POCT Blood Glucose.: 103 mg/dL (22 May 2024 16:08)    I&O's Summary    22 May 2024 07:01  -  23 May 2024 07:00  --------------------------------------------------------  IN: 0 mL / OUT: 1550 mL / NET: -1550 mL        PHYSICAL EXAM:  Vital Signs Last 24 Hrs  T(C): 37.2 (23 May 2024 05:30), Max: 38.4 (22 May 2024 16:25)  T(F): 99 (23 May 2024 05:30), Max: 101.1 (22 May 2024 16:25)  HR: 98 (23 May 2024 05:30) (91 - 109)  BP: 163/79 (23 May 2024 05:30) (108/64 - 163/79)  BP(mean): 105 (22 May 2024 17:10) (99 - 105)  RR: 18 (23 May 2024 05:30) (16 - 18)  SpO2: 97% (23 May 2024 05:30) (95% - 100%)    Parameters below as of 23 May 2024 05:30  Patient On (Oxygen Delivery Method): room air        GENERAL: NAD, lying in bed comfortably  HEAD: Atraumatic, normocephalic  EYES: EOMI, PERRLA  HEART: S1, S2, Regular rate and rhythm, no murmurs, rubs, or gallops  LUNGS: Unlabored respirations, clear to auscultation bilaterally, no crackles, wheezing, or rhonchi  ABDOMEN: Soft, nontender, nondistended, +BS  EXTREMITIES: No clubbing, cyanosis, or edema  NERVOUS SYSTEM:  A&Ox3, poor attention. Tremor in b/l UE    LABS:                        12.7   18.76 )-----------( 158      ( 22 May 2024 00:29 )             38.5     05    137  |  99  |  22  ----------------------------<  124<H>  3.9   |  24  |  1.21    Ca    9.7      22 May 2024 00:29    TPro  7.8  /  Alb  4.2  /  TBili  0.6  /  DBili  x   /  AST  11  /  ALT  6<L>  /  AlkPhos  110  -    PT/INR - ( 22 May 2024 00:29 )   PT: 11.7 sec;   INR: 1.07 ratio         PTT - ( 22 May 2024 00:29 )  PTT:27.9 sec      Urinalysis Basic - ( 22 May 2024 02:19 )    Color: Yellow / Appearance: Cloudy / S.021 / pH: x  Gluc: x / Ketone: Trace mg/dL  / Bili: Negative / Urobili: 1.0 mg/dL   Blood: x / Protein: 30 mg/dL / Nitrite: Positive   Leuk Esterase: Moderate / RBC: 2 /HPF /  /HPF   Sq Epi: x / Non Sq Epi: 1 /HPF / Bacteria: Few /HPF        Culture - Blood (collected 21 May 2024 23:30)  Source: .Blood Blood-Peripheral  Preliminary Report (23 May 2024 04:02):    No growth at 24 hours        RADIOLOGY & ADDITIONAL TESTS:  Results Reviewed:   Imaging Personally Reviewed:  Electrocardiogram Personally Reviewed:  Tele:

## 2024-05-23 NOTE — DIETITIAN INITIAL EVALUATION ADULT - REASON INDICATOR FOR ASSESSMENT
Consult for MST score of 2 or >  Sources: Medical Record, pt confused/A&Ox1 this morning in setting of encephalopathy - unable to participate meaningfully in interview   Chart reviewed, events noted.

## 2024-05-23 NOTE — DIETITIAN INITIAL EVALUATION ADULT - PROBLEM/PLAN-2
What Is The Reason For Today's Visit?: Full Body Skin Examination
What Is The Reason For Today's Visit? (Being Monitored For X): the development of a new lesion
DISPLAY PLAN FREE TEXT

## 2024-05-23 NOTE — DIETITIAN INITIAL EVALUATION ADULT - PERTINENT MEDS FT
MEDICATIONS  (STANDING):  carbidopa/levodopa  25/100 1 Tablet(s) Oral three times a day  chlorhexidine 4% Liquid 1 Application(s) Topical daily  dextrose 10% Bolus 125 milliLiter(s) IV Bolus once  dextrose 5%. 1000 milliLiter(s) (100 mL/Hr) IV Continuous <Continuous>  dextrose 5%. 1000 milliLiter(s) (50 mL/Hr) IV Continuous <Continuous>  dextrose 50% Injectable 25 Gram(s) IV Push once  dextrose 50% Injectable 12.5 Gram(s) IV Push once  dorzolamide 2%/timolol 0.5% Ophthalmic Solution 1 Drop(s) Both EYES two times a day  glucagon  Injectable 1 milliGRAM(s) IntraMuscular once  insulin lispro (ADMELOG) corrective regimen sliding scale   SubCutaneous at bedtime  insulin lispro (ADMELOG) corrective regimen sliding scale   SubCutaneous three times a day before meals  latanoprost 0.005% Ophthalmic Solution 1 Drop(s) Both EYES at bedtime  piperacillin/tazobactam IVPB.. 3.375 Gram(s) IV Intermittent every 8 hours  polyethylene glycol 3350 17 Gram(s) Oral daily  simvastatin 40 milliGRAM(s) Oral at bedtime  tamsulosin 0.4 milliGRAM(s) Oral at bedtime  traZODone 50 milliGRAM(s) Oral at bedtime    MEDICATIONS  (PRN):  acetaminophen     Tablet .. 650 milliGRAM(s) Oral every 6 hours PRN Temp greater or equal to 38C (100.4F), Mild Pain (1 - 3)  aluminum hydroxide/magnesium hydroxide/simethicone Suspension 30 milliLiter(s) Oral every 4 hours PRN Dyspepsia  dextrose Oral Gel 15 Gram(s) Oral once PRN Blood Glucose LESS THAN 70 milliGRAM(s)/deciliter  melatonin 3 milliGRAM(s) Oral at bedtime PRN Insomnia  ondansetron Injectable 4 milliGRAM(s) IV Push every 8 hours PRN Nausea and/or Vomiting

## 2024-05-23 NOTE — PHYSICAL THERAPY INITIAL EVALUATION ADULT - ADDITIONAL COMMENTS
pt is a poor historian; social history obtained from Son on phone. Pt resides in a private home with wife and son. Pt has 10 stairs to enter and 1 flight inside. PTA pt ambulates with a cane and requires assist with all ADLs. pt has HHA 7 days a week/9-2Pm.

## 2024-05-23 NOTE — DIETITIAN INITIAL EVALUATION ADULT - NSFNSGIIOFT_GEN_A_CORE
I&O's Detail    22 May 2024 07:01  -  23 May 2024 07:00  --------------------------------------------------------  IN:  Total IN: 0 mL    OUT:    Voided (mL): 1550 mL  Total OUT: 1550 mL    Total NET: -1550 mL      23 May 2024 07:01  -  23 May 2024 15:06  --------------------------------------------------------  IN:    Oral Fluid: 600 mL  Total IN: 600 mL    OUT:    Indwelling Catheter - Urethral (mL): 200 mL  Total OUT: 200 mL    Total NET: 400 mL

## 2024-05-23 NOTE — PROGRESS NOTE ADULT - ATTENDING COMMENTS
84 yo male PMHx Parkinson, BPH with straight cath at home admitted with severe sepsis 2/2 UTI    # Sepsis/UTI: cont with emperic abx . will follow up with UCx, BlCx    # Urinary retention : self cath at home currently now with schumacher   cont with flomax.     # HTN: holding BP meds in setting of sepsis  monitor BP and resume when stable    # Parkinson : cont with sinemet     PT eval.   rest of care as above

## 2024-05-23 NOTE — PROGRESS NOTE ADULT - PROBLEM SELECTOR PLAN 1
Patient meets severe sepsis criteria: WBC, Fever, Potential source and EOD (encephalopathy)  -failed oral cephalosporin, will conitnue with zoysn for pseudomonas and anaerobic coverage.  -Maintain MAP>65, no need to bolus given SBP>90  -follow up BCx, UCx.  -once mental status improves, dicsusse with patient if he is still able to straight cath (difficulties per family from Parkinson) vs. schumacher vs. suprapubic catheter. Patient meets severe sepsis criteria: WBC, Fever, Potential source and EOD (encephalopathy)  -failed oral cephalosporin, will conitnue with zoysn for pseudomonas and anaerobic coverage.  -Maintain MAP>65, no need to bolus given SBP>90  -follow up BCx, UCx.  -once mental status improves, dicsusse with patient if he is still able to straight cath (difficulties per family from Parkinson) vs. schumacher vs. suprapubic catheter  - lactate downtrending

## 2024-05-23 NOTE — DIETITIAN INITIAL EVALUATION ADULT - PHYSCIAL ASSESSMENT
Unable to assess subjective wt hx.     Current dosing wt 163 pounds (5/22)  RD obtained bedscale wt 156 pounds (5/23)  Wt hx per [facility] HI in pounds: 179.5 (3/2023)    Pt with wt loss x ~1 yr, not clinically significant. RD to continue to monitor wt as able   IBW: 136 pounds

## 2024-05-23 NOTE — DIETITIAN INITIAL EVALUATION ADULT - PERTINENT LABORATORY DATA
05-23    134<L>  |  102  |  20  ----------------------------<  98  3.7   |  17<L>  |  1.09    Ca    8.7      23 May 2024 07:01    TPro  7.8  /  Alb  4.2  /  TBili  0.6  /  DBili  x   /  AST  11  /  ALT  6<L>  /  AlkPhos  110  05-22  POCT Blood Glucose.: 109 mg/dL (05-23-24 @ 12:16)  A1C with Estimated Average Glucose Result: 5.8 % (05-23-24 @ 07:04)

## 2024-05-23 NOTE — DIETITIAN INITIAL EVALUATION ADULT - ADD RECOMMEND
1) Continue regular diet free of therapeutic restrictions, defer diet texture to team   2) Add multivitamin daily for micronutrient coverage pending no medical contraindications   3) Monitor nutritional intake, diet tolerance, labs, hydration, GI function, skin integrity and wt trends

## 2024-05-23 NOTE — DIETITIAN INITIAL EVALUATION ADULT - ENERGY INTAKE
Pt with % intake of breakfast and lunch today per flowsheet. RD to add diet mighty shakes to ensure optimal intake/Adequate (%)

## 2024-05-23 NOTE — DIETITIAN INITIAL EVALUATION ADULT - OTHER INFO
- Admitted for altered mental status, found to meet sepsis criteria with encephalopathy likely secondary to UTI, now ordered for IV antibiotics   - Noted hyponatremic

## 2024-05-23 NOTE — PHYSICAL THERAPY INITIAL EVALUATION ADULT - TRANSFER TRAINING, PT EVAL
GOAL: Pt will transfer sit<->stand with contact guard to improve overall ease with transfers in 2 weeks.

## 2024-05-23 NOTE — DIETITIAN INITIAL EVALUATION ADULT - NSFNSGIASSESSMENTFT_GEN_A_CORE
No BM recorded thus far during admission. Bowel regimen includes: miralax  - Ordered for zofran and aluminum hydroxide/mg hydroxide/simethicone suspension

## 2024-05-24 ENCOUNTER — TRANSCRIPTION ENCOUNTER (OUTPATIENT)
Age: 84
End: 2024-05-24

## 2024-05-24 LAB
ANION GAP SERPL CALC-SCNC: 14 MMOL/L — SIGNIFICANT CHANGE UP (ref 5–17)
BUN SERPL-MCNC: 14 MG/DL — SIGNIFICANT CHANGE UP (ref 7–23)
CALCIUM SERPL-MCNC: 8.8 MG/DL — SIGNIFICANT CHANGE UP (ref 8.4–10.5)
CHLORIDE SERPL-SCNC: 103 MMOL/L — SIGNIFICANT CHANGE UP (ref 96–108)
CO2 SERPL-SCNC: 19 MMOL/L — LOW (ref 22–31)
CREAT SERPL-MCNC: 0.98 MG/DL — SIGNIFICANT CHANGE UP (ref 0.5–1.3)
EGFR: 76 ML/MIN/1.73M2 — SIGNIFICANT CHANGE UP
GLUCOSE BLDC GLUCOMTR-MCNC: 113 MG/DL — HIGH (ref 70–99)
GLUCOSE BLDC GLUCOMTR-MCNC: 120 MG/DL — HIGH (ref 70–99)
GLUCOSE BLDC GLUCOMTR-MCNC: 126 MG/DL — HIGH (ref 70–99)
GLUCOSE BLDC GLUCOMTR-MCNC: 132 MG/DL — HIGH (ref 70–99)
GLUCOSE SERPL-MCNC: 118 MG/DL — HIGH (ref 70–99)
HCT VFR BLD CALC: 35 % — LOW (ref 39–50)
HGB BLD-MCNC: 11.8 G/DL — LOW (ref 13–17)
MAGNESIUM SERPL-MCNC: 1.6 MG/DL — SIGNIFICANT CHANGE UP (ref 1.6–2.6)
MCHC RBC-ENTMCNC: 30.6 PG — SIGNIFICANT CHANGE UP (ref 27–34)
MCHC RBC-ENTMCNC: 33.7 GM/DL — SIGNIFICANT CHANGE UP (ref 32–36)
MCV RBC AUTO: 90.7 FL — SIGNIFICANT CHANGE UP (ref 80–100)
NRBC # BLD: 0 /100 WBCS — SIGNIFICANT CHANGE UP (ref 0–0)
PHOSPHATE SERPL-MCNC: 2.3 MG/DL — LOW (ref 2.5–4.5)
PLATELET # BLD AUTO: 140 K/UL — LOW (ref 150–400)
POTASSIUM SERPL-MCNC: 3.6 MMOL/L — SIGNIFICANT CHANGE UP (ref 3.5–5.3)
POTASSIUM SERPL-SCNC: 3.6 MMOL/L — SIGNIFICANT CHANGE UP (ref 3.5–5.3)
RBC # BLD: 3.86 M/UL — LOW (ref 4.2–5.8)
RBC # FLD: 14 % — SIGNIFICANT CHANGE UP (ref 10.3–14.5)
SODIUM SERPL-SCNC: 136 MMOL/L — SIGNIFICANT CHANGE UP (ref 135–145)
WBC # BLD: 15.15 K/UL — HIGH (ref 3.8–10.5)
WBC # FLD AUTO: 15.15 K/UL — HIGH (ref 3.8–10.5)

## 2024-05-24 PROCEDURE — 99232 SBSQ HOSP IP/OBS MODERATE 35: CPT | Mod: GC

## 2024-05-24 RX ORDER — PIPERACILLIN AND TAZOBACTAM 4; .5 G/20ML; G/20ML
3.38 INJECTION, POWDER, LYOPHILIZED, FOR SOLUTION INTRAVENOUS EVERY 8 HOURS
Refills: 0 | Status: DISCONTINUED | OUTPATIENT
Start: 2024-05-24 | End: 2024-05-28

## 2024-05-24 RX ORDER — MAGNESIUM SULFATE 500 MG/ML
2 VIAL (ML) INJECTION ONCE
Refills: 0 | Status: COMPLETED | OUTPATIENT
Start: 2024-05-24 | End: 2024-05-24

## 2024-05-24 RX ORDER — CYCLOSPORINE 0.5 MG/ML
1 EMULSION OPHTHALMIC
Refills: 0 | DISCHARGE

## 2024-05-24 RX ORDER — LISINOPRIL 2.5 MG/1
40 TABLET ORAL DAILY
Refills: 0 | Status: DISCONTINUED | OUTPATIENT
Start: 2024-05-24 | End: 2024-05-28

## 2024-05-24 RX ORDER — POTASSIUM PHOSPHATE, MONOBASIC POTASSIUM PHOSPHATE, DIBASIC 236; 224 MG/ML; MG/ML
30 INJECTION, SOLUTION INTRAVENOUS ONCE
Refills: 0 | Status: COMPLETED | OUTPATIENT
Start: 2024-05-24 | End: 2024-05-24

## 2024-05-24 RX ADMIN — DORZOLAMIDE HYDROCHLORIDE TIMOLOL MALEATE 1 DROP(S): 20; 5 SOLUTION/ DROPS OPHTHALMIC at 05:44

## 2024-05-24 RX ADMIN — CARBIDOPA AND LEVODOPA 1 TABLET(S): 25; 100 TABLET ORAL at 13:24

## 2024-05-24 RX ADMIN — CARBIDOPA AND LEVODOPA 1 TABLET(S): 25; 100 TABLET ORAL at 05:44

## 2024-05-24 RX ADMIN — CHLORHEXIDINE GLUCONATE 1 APPLICATION(S): 213 SOLUTION TOPICAL at 11:42

## 2024-05-24 RX ADMIN — SIMVASTATIN 40 MILLIGRAM(S): 20 TABLET, FILM COATED ORAL at 23:15

## 2024-05-24 RX ADMIN — CARBIDOPA AND LEVODOPA 1 TABLET(S): 25; 100 TABLET ORAL at 23:14

## 2024-05-24 RX ADMIN — PIPERACILLIN AND TAZOBACTAM 25 GRAM(S): 4; .5 INJECTION, POWDER, LYOPHILIZED, FOR SOLUTION INTRAVENOUS at 23:14

## 2024-05-24 RX ADMIN — LATANOPROST 1 DROP(S): 0.05 SOLUTION/ DROPS OPHTHALMIC; TOPICAL at 23:15

## 2024-05-24 RX ADMIN — PIPERACILLIN AND TAZOBACTAM 25 GRAM(S): 4; .5 INJECTION, POWDER, LYOPHILIZED, FOR SOLUTION INTRAVENOUS at 13:24

## 2024-05-24 RX ADMIN — TAMSULOSIN HYDROCHLORIDE 0.4 MILLIGRAM(S): 0.4 CAPSULE ORAL at 23:15

## 2024-05-24 RX ADMIN — POTASSIUM PHOSPHATE, MONOBASIC POTASSIUM PHOSPHATE, DIBASIC 83.33 MILLIMOLE(S): 236; 224 INJECTION, SOLUTION INTRAVENOUS at 11:47

## 2024-05-24 RX ADMIN — PIPERACILLIN AND TAZOBACTAM 25 GRAM(S): 4; .5 INJECTION, POWDER, LYOPHILIZED, FOR SOLUTION INTRAVENOUS at 05:44

## 2024-05-24 RX ADMIN — Medication 25 GRAM(S): at 10:03

## 2024-05-24 RX ADMIN — DORZOLAMIDE HYDROCHLORIDE TIMOLOL MALEATE 1 DROP(S): 20; 5 SOLUTION/ DROPS OPHTHALMIC at 17:13

## 2024-05-24 RX ADMIN — Medication 3 MILLIGRAM(S): at 23:15

## 2024-05-24 RX ADMIN — POLYETHYLENE GLYCOL 3350 17 GRAM(S): 17 POWDER, FOR SOLUTION ORAL at 11:42

## 2024-05-24 RX ADMIN — Medication 50 MILLIGRAM(S): at 23:15

## 2024-05-24 NOTE — PROGRESS NOTE ADULT - PROBLEM SELECTOR PLAN 1
Patient meets severe sepsis criteria: WBC, Fever, Potential source and EOD (encephalopathy)  -failed oral cephalosporin, will conitnue with zoysn for pseudomonas and anaerobic coverage.  -Maintain MAP>65, no need to bolus given SBP>90  -follow up BCx, UCx.  -once mental status improves, dicsusse with patient if he is still able to straight cath (difficulties per family from Parkinson) vs. schumacher vs. suprapubic catheter  - lactate downtrending Patient meets severe sepsis criteria: WBC, Fever, Potential source and EOD (encephalopathy)  -failed oral cephalosporin, will conitnue with zoysn for pseudomonas and anaerobic coverage.  -Maintain MAP>65, no need to bolus given SBP>90  -follow up BCx, UCx (gram neg rods)  -once mental status improves, dicsusse with patient if he is still able to straight cath (difficulties per family from Parkinson) vs. schumacher vs. suprapubic catheter

## 2024-05-24 NOTE — DISCHARGE NOTE PROVIDER - NSDCCPCAREPLAN_GEN_ALL_CORE_FT
PRINCIPAL DISCHARGE DIAGNOSIS  Diagnosis: Acute UTI  Assessment and Plan of Treatment: You came to the hospital confused. You were found to have a urinary tract infection. You were treated with the medication zosyn. This was changed to ... that you will be discharged on. A catheter was placed for urinary retention and was removed. You were unable to urinate but were able to straight catheterize yourself. Please follow up with your primary care provider within the next week.      SECONDARY DISCHARGE DIAGNOSES  Diagnosis: Acute UTI  Assessment and Plan of Treatment:      PRINCIPAL DISCHARGE DIAGNOSIS  Diagnosis: Acute UTI  Assessment and Plan of Treatment: You came to the hospital confused. You were found to have a urinary tract infection. You were treated with the medication zosyn. This was changed to ciprofloxacin that you will be discharged on for 3 more days. A catheter was placed for urinary retention and was removed. You were able to urinate on your own. Please follow up with your primary care provider within the next week. Please return to the emergency deparment if you develope high fever, abdominal/flank pain, loss of conciousness, confusion.

## 2024-05-24 NOTE — DISCHARGE NOTE PROVIDER - CARE PROVIDER_API CALL
Liam López  Phone: [phone]  Fax: [phone]  Follow Up Time:    Gita Singh  Internal Medicine  [address], [address]  [place] [address]  Phone: [phone]  Fax: [phone]  Follow Up Time:

## 2024-05-24 NOTE — PROGRESS NOTE ADULT - SUBJECTIVE AND OBJECTIVE BOX
*******************************  Rogerio Stone MD (PGY-1)  Internal Medicine  Contact via [employer] TEAMS  *******************************    PROGRESS NOTE:     Patient is a 83y old  Male who presents with a chief complaint of Sepsis     (23 May 2024 14:44)      INTERVAL EVENTS: No acute overnight events.     SUBJECTIVE: Patient seen and examined at bedside. This morning, the patient is comfortable and doing well. No acute complaints. Denies fevers, chills, N/V/D, chest pain, SOB, abdominal pain.    MEDICATIONS  (STANDING):  carbidopa/levodopa  25/100 1 Tablet(s) Oral three times a day  chlorhexidine 4% Liquid 1 Application(s) Topical daily  dextrose 10% Bolus 125 milliLiter(s) IV Bolus once  dextrose 5%. 1000 milliLiter(s) (50 mL/Hr) IV Continuous <Continuous>  dextrose 5%. 1000 milliLiter(s) (100 mL/Hr) IV Continuous <Continuous>  dextrose 50% Injectable 25 Gram(s) IV Push once  dextrose 50% Injectable 12.5 Gram(s) IV Push once  dorzolamide 2%/timolol 0.5% Ophthalmic Solution 1 Drop(s) Both EYES two times a day  glucagon  Injectable 1 milliGRAM(s) IntraMuscular once  insulin lispro (ADMELOG) corrective regimen sliding scale   SubCutaneous three times a day before meals  insulin lispro (ADMELOG) corrective regimen sliding scale   SubCutaneous at bedtime  latanoprost 0.005% Ophthalmic Solution 1 Drop(s) Both EYES at bedtime  piperacillin/tazobactam IVPB.. 3.375 Gram(s) IV Intermittent every 8 hours  polyethylene glycol 3350 17 Gram(s) Oral daily  simvastatin 40 milliGRAM(s) Oral at bedtime  tamsulosin 0.4 milliGRAM(s) Oral at bedtime  traZODone 50 milliGRAM(s) Oral at bedtime    MEDICATIONS  (PRN):  acetaminophen     Tablet .. 650 milliGRAM(s) Oral every 6 hours PRN Temp greater or equal to 38C (100.4F), Mild Pain (1 - 3)  aluminum hydroxide/magnesium hydroxide/simethicone Suspension 30 milliLiter(s) Oral every 4 hours PRN Dyspepsia  dextrose Oral Gel 15 Gram(s) Oral once PRN Blood Glucose LESS THAN 70 milliGRAM(s)/deciliter  melatonin 3 milliGRAM(s) Oral at bedtime PRN Insomnia  ondansetron Injectable 4 milliGRAM(s) IV Push every 8 hours PRN Nausea and/or Vomiting      CAPILLARY BLOOD GLUCOSE      POCT Blood Glucose.: 128 mg/dL (23 May 2024 21:31)  POCT Blood Glucose.: 130 mg/dL (23 May 2024 17:18)  POCT Blood Glucose.: 109 mg/dL (23 May 2024 12:16)  POCT Blood Glucose.: 113 mg/dL (23 May 2024 08:33)    I&O's Summary    22 May 2024 07:01  -  23 May 2024 07:00  --------------------------------------------------------  IN: 0 mL / OUT: 1550 mL / NET: -1550 mL    23 May 2024 07:01  -  24 May 2024 06:43  --------------------------------------------------------  IN: 840 mL / OUT: 700 mL / NET: 140 mL        PHYSICAL EXAM:  Vital Signs Last 24 Hrs  T(C): 36.7 (24 May 2024 04:23), Max: 37 (23 May 2024 22:04)  T(F): 98.1 (24 May 2024 04:23), Max: 98.6 (23 May 2024 22:04)  HR: 87 (24 May 2024 04:23) (69 - 96)  BP: 166/87 (24 May 2024 04:23) (135/73 - 166/87)  BP(mean): --  RR: 18 (24 May 2024 04:23) (18 - 18)  SpO2: 98% (24 May 2024 04:23) (97% - 99%)    Parameters below as of 24 May 2024 04:23  Patient On (Oxygen Delivery Method): room air        GENERAL: NAD, lying in bed comfortably  HEAD: Atraumatic, normocephalic  EYES: EOMI, PERRLA  HEART: S1, S2, Regular rate and rhythm, no murmurs, rubs, or gallops  LUNGS: Unlabored respirations, clear to auscultation bilaterally, no crackles, wheezing, or rhonchi  ABDOMEN: Soft, nontender, nondistended, +BS  EXTREMITIES: No clubbing, cyanosis, or edema  NERVOUS SYSTEM:  A&Ox3, poor attention. Tremor in b/l UE    LABS:                        11.7   19.53 )-----------( 123      ( 23 May 2024 07:02 )             34.9     05-23    134<L>  |  102  |  20  ----------------------------<  98  3.7   |  17<L>  |  1.09    Ca    8.7      23 May 2024 07:01            Urinalysis Basic - ( 23 May 2024 07:01 )    Color: x / Appearance: x / SG: x / pH: x  Gluc: 98 mg/dL / Ketone: x  / Bili: x / Urobili: x   Blood: x / Protein: x / Nitrite: x   Leuk Esterase: x / RBC: x / WBC x   Sq Epi: x / Non Sq Epi: x / Bacteria: x        Culture - Urine (collected 22 May 2024 02:19)  Source: Catheterized Catheterized  Preliminary Report (23 May 2024 13:40):    >100,000 CFU/ml Gram Negative Rods    Culture - Blood (collected 21 May 2024 23:30)  Source: .Blood Blood-Peripheral  Preliminary Report (24 May 2024 04:02):    No growth at 48 Hours        RADIOLOGY & ADDITIONAL TESTS:  Results Reviewed:   Imaging Personally Reviewed:  Electrocardiogram Personally Reviewed:  Tele: *******************************  Rogerio Stone MD (PGY-1)  Internal Medicine  Contact via [employer] TEAMS  *******************************    PROGRESS NOTE:     Patient is a 83y old  Male who presents with a chief complaint of Sepsis     (23 May 2024 14:44)      INTERVAL EVENTS: No acute overnight events.     SUBJECTIVE: Patient seen and examined at bedside. Some dysuria. Denies fevers, chills, N/V/D, chest pain, SOB, abdominal pain.    MEDICATIONS  (STANDING):  carbidopa/levodopa  25/100 1 Tablet(s) Oral three times a day  chlorhexidine 4% Liquid 1 Application(s) Topical daily  dextrose 10% Bolus 125 milliLiter(s) IV Bolus once  dextrose 5%. 1000 milliLiter(s) (50 mL/Hr) IV Continuous <Continuous>  dextrose 5%. 1000 milliLiter(s) (100 mL/Hr) IV Continuous <Continuous>  dextrose 50% Injectable 25 Gram(s) IV Push once  dextrose 50% Injectable 12.5 Gram(s) IV Push once  dorzolamide 2%/timolol 0.5% Ophthalmic Solution 1 Drop(s) Both EYES two times a day  glucagon  Injectable 1 milliGRAM(s) IntraMuscular once  insulin lispro (ADMELOG) corrective regimen sliding scale   SubCutaneous three times a day before meals  insulin lispro (ADMELOG) corrective regimen sliding scale   SubCutaneous at bedtime  latanoprost 0.005% Ophthalmic Solution 1 Drop(s) Both EYES at bedtime  piperacillin/tazobactam IVPB.. 3.375 Gram(s) IV Intermittent every 8 hours  polyethylene glycol 3350 17 Gram(s) Oral daily  simvastatin 40 milliGRAM(s) Oral at bedtime  tamsulosin 0.4 milliGRAM(s) Oral at bedtime  traZODone 50 milliGRAM(s) Oral at bedtime    MEDICATIONS  (PRN):  acetaminophen     Tablet .. 650 milliGRAM(s) Oral every 6 hours PRN Temp greater or equal to 38C (100.4F), Mild Pain (1 - 3)  aluminum hydroxide/magnesium hydroxide/simethicone Suspension 30 milliLiter(s) Oral every 4 hours PRN Dyspepsia  dextrose Oral Gel 15 Gram(s) Oral once PRN Blood Glucose LESS THAN 70 milliGRAM(s)/deciliter  melatonin 3 milliGRAM(s) Oral at bedtime PRN Insomnia  ondansetron Injectable 4 milliGRAM(s) IV Push every 8 hours PRN Nausea and/or Vomiting      CAPILLARY BLOOD GLUCOSE      POCT Blood Glucose.: 128 mg/dL (23 May 2024 21:31)  POCT Blood Glucose.: 130 mg/dL (23 May 2024 17:18)  POCT Blood Glucose.: 109 mg/dL (23 May 2024 12:16)  POCT Blood Glucose.: 113 mg/dL (23 May 2024 08:33)    I&O's Summary    22 May 2024 07:01  -  23 May 2024 07:00  --------------------------------------------------------  IN: 0 mL / OUT: 1550 mL / NET: -1550 mL    23 May 2024 07:01  -  24 May 2024 06:43  --------------------------------------------------------  IN: 840 mL / OUT: 700 mL / NET: 140 mL        PHYSICAL EXAM:  Vital Signs Last 24 Hrs  T(C): 36.7 (24 May 2024 04:23), Max: 37 (23 May 2024 22:04)  T(F): 98.1 (24 May 2024 04:23), Max: 98.6 (23 May 2024 22:04)  HR: 87 (24 May 2024 04:23) (69 - 96)  BP: 166/87 (24 May 2024 04:23) (135/73 - 166/87)  BP(mean): --  RR: 18 (24 May 2024 04:23) (18 - 18)  SpO2: 98% (24 May 2024 04:23) (97% - 99%)    Parameters below as of 24 May 2024 04:23  Patient On (Oxygen Delivery Method): room air        GENERAL: NAD, lying in bed comfortably  HEAD: Atraumatic, normocephalic  EYES: EOMI, PERRLA  HEART: S1, S2, Regular rate and rhythm, no murmurs, rubs, or gallops  LUNGS: Unlabored respirations, clear to auscultation bilaterally, no crackles, wheezing, or rhonchi  ABDOMEN: Soft, nontender, nondistended, +BS  EXTREMITIES: No clubbing, cyanosis, or edema  NERVOUS SYSTEM:  A&Ox2, poor attention. Tremor in b/l UE    LABS:                        11.7   19.53 )-----------( 123      ( 23 May 2024 07:02 )             34.9     05-23    134<L>  |  102  |  20  ----------------------------<  98  3.7   |  17<L>  |  1.09    Ca    8.7      23 May 2024 07:01            Urinalysis Basic - ( 23 May 2024 07:01 )    Color: x / Appearance: x / SG: x / pH: x  Gluc: 98 mg/dL / Ketone: x  / Bili: x / Urobili: x   Blood: x / Protein: x / Nitrite: x   Leuk Esterase: x / RBC: x / WBC x   Sq Epi: x / Non Sq Epi: x / Bacteria: x        Culture - Urine (collected 22 May 2024 02:19)  Source: Catheterized Catheterized  Preliminary Report (23 May 2024 13:40):    >100,000 CFU/ml Gram Negative Rods    Culture - Blood (collected 21 May 2024 23:30)  Source: .Blood Blood-Peripheral  Preliminary Report (24 May 2024 04:02):    No growth at 48 Hours        RADIOLOGY & ADDITIONAL TESTS:  Results Reviewed:   Imaging Personally Reviewed:  Electrocardiogram Personally Reviewed:  Tele:

## 2024-05-24 NOTE — DISCHARGE NOTE PROVIDER - CARE PROVIDERS DIRECT ADDRESSES
,[email or web address] [email or web address]

## 2024-05-24 NOTE — DISCHARGE NOTE PROVIDER - HOSPITAL COURSE
Discharge Summary     Discharge diagnoses:   uti  Hospital Course:   For full details, please see H&P, progress notes, consult notes and ancillary notes. Briefly, 83 year old male PMHx Parkinsons disease, BPH (self catheterizaes), HTN, HLD, non-insulin depenent DM type 2 (on metformin) presented with encephalopathy and fall found to have UTI. Failed oral cephelosporin outpt so started on zosyn. Ucx w/... transitioned to... will be dced on ... for 7x day course. Leavitt placed on admission. Dced and pt retaining but able to straight cath as does at home. Pts mental status improved to b/l.     Discharge follow up action items:   1. f/u w/ pcp within 1 week     Discharge Summary     Discharge diagnoses:   uti  Hospital Course:   For full details, please see H&P, progress notes, consult notes and ancillary notes. Briefly, 83 year old male PMHx Parkinson's disease, BPH (self catheterizaes), HTN, HLD, non-insulin depenent DM type 2 (on metformin) presented with encephalopathy and fall found to have UTI. Failed oral cephalosporin outpt so started on zosyn. Ucx w/pan sensetive pseudomonas. Will be dced on cipro for 7x day total course abx. Leavitt placed on admission. TOVed and pt able to urinate on own. Pts mental status improved to b/l.     Discharge follow up action items:   1. f/u w/ pcp within 1 week     Medication changes  Start Cipro 500mg bid 3x more days  Discharge Summary     Discharge diagnoses:   uti  Hospital Course:   For full details, please see H&P, progress notes, consult notes and ancillary notes. Briefly, 83 year old male PMHx Parkinson's disease, BPH (self catheterizaes), HTN, HLD, non-insulin depenent DM type 2 (on metformin) presented with encephalopathy and fall found to have UTI. Failed oral cephalosporin outpt so started on zosyn. Ucx w/pan sensetive pseudomonas. Will be dced on cipro for 7x day total course abx. Leavitt placed on admission. TOVed and pt able to urinate on own. Pts mental status improved to b/l.     Discharge follow up action items:   1. f/u w/ pcp within 1 week     Medication changes  Start Cipro 500mg bid 3x more days    post discharge addendum: acute encephalopathy on admit due to sepsis from UTI

## 2024-05-24 NOTE — DISCHARGE NOTE PROVIDER - NSDCCPTREATMENT_GEN_ALL_CORE_FT
PRINCIPAL PROCEDURE  Procedure: CT abdomen and pelvis  Findings and Treatment:   IMPRESSION:  Mild bladder wall thickening could be related to chronic outlet   obstruction given the enlarged prostate. Cystitis should be excluded on a   clinical basis and correlated with urinalysis.  Contracted gallbladder containing gallstone. No pericholecystic   inflammatory changes. No acute bowel findings.

## 2024-05-24 NOTE — DISCHARGE NOTE PROVIDER - NSDCFUADDAPPT_GEN_ALL_CORE_FT
APPTS ARE READY TO BE MADE: [ ] YES    Best Family or Patient Contact (if needed):    Additional Information about above appointments (if needed):    1: f/u w/ pcp within 1 week   2:   3:     Other comments or requests:    APPTS ARE READY TO BE MADE: [x] YES    Best Family or Patient Contact (if needed):    Additional Information about above appointments (if needed):    1: f/u w/ pcp within 1 week     Other comments or requests:    APPTS ARE READY TO BE MADE: [x] YES    Best Family or Patient Contact (if needed):    Additional Information about above appointments (if needed):    1: f/u w/ pcp within 1 week     Other comments or requests:   Patient is being discharged to rehab. Patient/caregiver will arrange follow up appointments.

## 2024-05-24 NOTE — DISCHARGE NOTE PROVIDER - NSDCACTIVITY_GEN_ALL_CORE
[FreeTextEntry1] : s/p thyroid lobectomy\par blood work\par f/u Dr Baron\par f/u 6 months\par blood work
No heavy lifting/straining

## 2024-05-24 NOTE — DISCHARGE NOTE PROVIDER - NSDCMRMEDTOKEN_GEN_ALL_CORE_FT
dorzolamide-timolol 2.23%-0.68% (2%-0.5% base) ophthalmic solution: 1 drop(s) in each eye 2 times a day  Flomax 0.4 mg oral capsule: 1 cap(s) orally once a day  latanoprost 0.005% ophthalmic solution: 1 drop(s) in each eye once a day (at bedtime)  metFORMIN 500 mg oral tablet: 1 tab(s) orally once a day (in the morning)  Sinemet 25 mg-100 mg oral tablet: 1 tab(s) orally 3 times a day  traZODone 50 mg oral tablet: 1 tab(s) orally once a day (at bedtime) NOTE: MOST RECENT FILLS HAVE 50MG. PT HAD OLDER BOTTLE OF 100MG.  Zestril 40 mg oral tablet: 1 tab(s) orally once a day  Zocor 40 mg oral tablet: 1 tab(s) orally once a day (at bedtime)

## 2024-05-24 NOTE — PROGRESS NOTE ADULT - ATTENDING COMMENTS
84 yo male PMHx Parkinson, BPH with straight cath at home admitted with severe sepsis 2/2 UTI    # Sepsis/UTI: cont with emperic abx .   UCx with >100k GNR. awaiting ID and sens  BCx NGTD    # Urinary retention : self cath at home currently now with schumacher   cont with flomax.     # HTN: holding BP meds in setting of sepsis  monitor BP and resume when stable    # Parkinson : cont with sinemet     PT eval.   rest of care as above

## 2024-05-24 NOTE — DISCHARGE NOTE PROVIDER - NSFOLLOWUPCLINICS_GEN_ALL_ED_FT
[facility] - Primary Care  Primary Care  [address] [place] [address]  Phone: [phone]  Fax:

## 2024-05-25 LAB
-  AMIKACIN: SIGNIFICANT CHANGE UP
-  AZTREONAM: SIGNIFICANT CHANGE UP
-  CEFEPIME: SIGNIFICANT CHANGE UP
-  CEFTAZIDIME: SIGNIFICANT CHANGE UP
-  CIPROFLOXACIN: SIGNIFICANT CHANGE UP
-  IMIPENEM: SIGNIFICANT CHANGE UP
-  LEVOFLOXACIN: SIGNIFICANT CHANGE UP
-  MEROPENEM: SIGNIFICANT CHANGE UP
-  PIPERACILLIN/TAZOBACTAM: SIGNIFICANT CHANGE UP
ANION GAP SERPL CALC-SCNC: 16 MMOL/L — SIGNIFICANT CHANGE UP (ref 5–17)
BUN SERPL-MCNC: 17 MG/DL — SIGNIFICANT CHANGE UP (ref 7–23)
CALCIUM SERPL-MCNC: 9.5 MG/DL — SIGNIFICANT CHANGE UP (ref 8.4–10.5)
CHLORIDE SERPL-SCNC: 103 MMOL/L — SIGNIFICANT CHANGE UP (ref 96–108)
CO2 SERPL-SCNC: 18 MMOL/L — LOW (ref 22–31)
CREAT SERPL-MCNC: 1 MG/DL — SIGNIFICANT CHANGE UP (ref 0.5–1.3)
CULTURE RESULTS: ABNORMAL
EGFR: 74 ML/MIN/1.73M2 — SIGNIFICANT CHANGE UP
GLUCOSE BLDC GLUCOMTR-MCNC: 106 MG/DL — HIGH (ref 70–99)
GLUCOSE BLDC GLUCOMTR-MCNC: 114 MG/DL — HIGH (ref 70–99)
GLUCOSE BLDC GLUCOMTR-MCNC: 116 MG/DL — HIGH (ref 70–99)
GLUCOSE BLDC GLUCOMTR-MCNC: 124 MG/DL — HIGH (ref 70–99)
GLUCOSE SERPL-MCNC: 115 MG/DL — HIGH (ref 70–99)
HCT VFR BLD CALC: 37.3 % — LOW (ref 39–50)
HGB BLD-MCNC: 12.7 G/DL — LOW (ref 13–17)
MAGNESIUM SERPL-MCNC: 2 MG/DL — SIGNIFICANT CHANGE UP (ref 1.6–2.6)
MCHC RBC-ENTMCNC: 30.6 PG — SIGNIFICANT CHANGE UP (ref 27–34)
MCHC RBC-ENTMCNC: 34 GM/DL — SIGNIFICANT CHANGE UP (ref 32–36)
MCV RBC AUTO: 89.9 FL — SIGNIFICANT CHANGE UP (ref 80–100)
METHOD TYPE: SIGNIFICANT CHANGE UP
NRBC # BLD: 0 /100 WBCS — SIGNIFICANT CHANGE UP (ref 0–0)
ORGANISM # SPEC MICROSCOPIC CNT: ABNORMAL
ORGANISM # SPEC MICROSCOPIC CNT: ABNORMAL
PHOSPHATE SERPL-MCNC: 3.1 MG/DL — SIGNIFICANT CHANGE UP (ref 2.5–4.5)
PLATELET # BLD AUTO: 189 K/UL — SIGNIFICANT CHANGE UP (ref 150–400)
POTASSIUM SERPL-MCNC: 3.8 MMOL/L — SIGNIFICANT CHANGE UP (ref 3.5–5.3)
POTASSIUM SERPL-SCNC: 3.8 MMOL/L — SIGNIFICANT CHANGE UP (ref 3.5–5.3)
RBC # BLD: 4.15 M/UL — LOW (ref 4.2–5.8)
RBC # FLD: 14 % — SIGNIFICANT CHANGE UP (ref 10.3–14.5)
SODIUM SERPL-SCNC: 137 MMOL/L — SIGNIFICANT CHANGE UP (ref 135–145)
SPECIMEN SOURCE: SIGNIFICANT CHANGE UP
WBC # BLD: 9.17 K/UL — SIGNIFICANT CHANGE UP (ref 3.8–10.5)
WBC # FLD AUTO: 9.17 K/UL — SIGNIFICANT CHANGE UP (ref 3.8–10.5)

## 2024-05-25 PROCEDURE — 99232 SBSQ HOSP IP/OBS MODERATE 35: CPT | Mod: GC

## 2024-05-25 RX ORDER — SIMETHICONE 80 MG/1
80 TABLET, CHEWABLE ORAL THREE TIMES A DAY
Refills: 0 | Status: DISCONTINUED | OUTPATIENT
Start: 2024-05-25 | End: 2024-05-28

## 2024-05-25 RX ORDER — ENOXAPARIN SODIUM 100 MG/ML
40 INJECTION SUBCUTANEOUS EVERY 24 HOURS
Refills: 0 | Status: DISCONTINUED | OUTPATIENT
Start: 2024-05-25 | End: 2024-05-28

## 2024-05-25 RX ADMIN — TAMSULOSIN HYDROCHLORIDE 0.4 MILLIGRAM(S): 0.4 CAPSULE ORAL at 21:51

## 2024-05-25 RX ADMIN — PIPERACILLIN AND TAZOBACTAM 25 GRAM(S): 4; .5 INJECTION, POWDER, LYOPHILIZED, FOR SOLUTION INTRAVENOUS at 13:22

## 2024-05-25 RX ADMIN — Medication 50 MILLIGRAM(S): at 21:51

## 2024-05-25 RX ADMIN — DORZOLAMIDE HYDROCHLORIDE TIMOLOL MALEATE 1 DROP(S): 20; 5 SOLUTION/ DROPS OPHTHALMIC at 17:08

## 2024-05-25 RX ADMIN — CHLORHEXIDINE GLUCONATE 1 APPLICATION(S): 213 SOLUTION TOPICAL at 11:25

## 2024-05-25 RX ADMIN — LISINOPRIL 40 MILLIGRAM(S): 2.5 TABLET ORAL at 06:14

## 2024-05-25 RX ADMIN — DORZOLAMIDE HYDROCHLORIDE TIMOLOL MALEATE 1 DROP(S): 20; 5 SOLUTION/ DROPS OPHTHALMIC at 06:15

## 2024-05-25 RX ADMIN — PIPERACILLIN AND TAZOBACTAM 25 GRAM(S): 4; .5 INJECTION, POWDER, LYOPHILIZED, FOR SOLUTION INTRAVENOUS at 06:16

## 2024-05-25 RX ADMIN — ENOXAPARIN SODIUM 40 MILLIGRAM(S): 100 INJECTION SUBCUTANEOUS at 17:09

## 2024-05-25 RX ADMIN — SIMETHICONE 80 MILLIGRAM(S): 80 TABLET, CHEWABLE ORAL at 18:20

## 2024-05-25 RX ADMIN — SIMVASTATIN 40 MILLIGRAM(S): 20 TABLET, FILM COATED ORAL at 21:51

## 2024-05-25 RX ADMIN — POLYETHYLENE GLYCOL 3350 17 GRAM(S): 17 POWDER, FOR SOLUTION ORAL at 11:25

## 2024-05-25 RX ADMIN — CARBIDOPA AND LEVODOPA 1 TABLET(S): 25; 100 TABLET ORAL at 06:14

## 2024-05-25 RX ADMIN — CARBIDOPA AND LEVODOPA 1 TABLET(S): 25; 100 TABLET ORAL at 13:22

## 2024-05-25 RX ADMIN — CARBIDOPA AND LEVODOPA 1 TABLET(S): 25; 100 TABLET ORAL at 21:51

## 2024-05-25 RX ADMIN — PIPERACILLIN AND TAZOBACTAM 25 GRAM(S): 4; .5 INJECTION, POWDER, LYOPHILIZED, FOR SOLUTION INTRAVENOUS at 21:51

## 2024-05-25 RX ADMIN — LATANOPROST 1 DROP(S): 0.05 SOLUTION/ DROPS OPHTHALMIC; TOPICAL at 21:52

## 2024-05-25 NOTE — PROGRESS NOTE ADULT - ATTENDING COMMENTS
82 yo male PMHx Parkinson, BPH with straight cath at home admitted with severe sepsis 2/2 UTI    # Sepsis/UTI: Cont with empiric abx- Zosyn.   UCx with Pseudomonas. Awaiting ID and sensitivities   BCx NGTD    # Urinary retention: Patient self-catheterizes at home. Cont with Flomax.     # HTN: c/w Lisinopril. Monitor BP and resume when stable    # Parkinson : Cont with Sinemet     PT eval-RADHAMES   Rest of care as above   Discussed with resident.     Time-based billing (NON-critical care).     46 minutes spent on total encounter. The necessity of the time spent during the encounter on this date of service was due to:     - Ordering, reviewing, and interpreting labs, testing, and imaging.  - Independently obtaining a review of systems and performing a physical exam  - Reviewing consultant documentation/recommendations in addition to discussing plan of care with consultants.  - Counselling and educating patient and family regarding interpretation of aforementioned items and plan of care. 82 yo male PMHx Parkinson, BPH with straight cath at home admitted with severe sepsis 2/2 UTI    # Sepsis/UTI: Cont with empiric abx- Zosyn.   UCx with Pseudomonas. Awaiting ID and sensitivities   BCx NGTD    # Urinary retention: Patient self-catheterizes at home. Cont with Flomax.     # HTN: c/w Lisinopril. Monitor BP and resume when stable    # Parkinson : Cont with Sinemet     PT eval-RADHAMES   Rest of care as above   Discussed with resident.     Time-based billing (NON-critical care).     46 minutes spent on total encounter. The necessity of the time spent during the encounter on this date of service was due to:     - Reviewing, and interpreting labs, testing, and imaging.  - Independently obtaining a review of systems and performing a physical exam  - Reviewing consultant documentation/recommendations in addition to discussing plan of care with consultants.  - Counselling and educating patient and family regarding interpretation of aforementioned items and plan of care. 84 yo male PMHx Parkinson, BPH with straight cath at home admitted with severe sepsis 2/2 UTI    # Sepsis/UTI: Cont with empiric abx- Zosyn.   UCx with Pseudomonas. Awaiting ID and sensitivities   BCx NGTD    # Urinary retention: Patient self-catheterizes at home. Cont with Flomax.     # HTN: c/w Lisinopril. Monitor BP and resume when stable    # Parkinson : Cont with Sinemet     PT eval-RADHAMES   Rest of care as above   Discussed with resident.     Time-based billing (NON-critical care).     46 minutes spent on total encounter. The necessity of the time spent during the encounter on this date of service was due to:     - Reviewing, and interpreting labs, testing, and imaging.  - Independently obtaining a review of systems and performing a physical exam  - Reviewing consultant documentation/recommendations in addition to discussing plan of care with consultants.  - Counselling and educating patient and family regarding interpretation of aforementioned items and plan of care.  - Does not include time spent on resident education. 82 yo male PMHx Parkinson, BPH with straight cath at home admitted with severe sepsis 2/2 UTI    # Sepsis/UTI: Cont with empiric abx- Zosyn.   UCx with Pseudomonas. Awaiting ID and sensitivities   BCx NGTD    # Urinary retention: Patient self-catheterizes at home. Cont with Flomax.     # HTN: c/w Lisinopril. Monitor BP and resume when stable    # Parkinson : Cont with Sinemet     PT eval-RADHAMES   Rest of care as above   Discussed with resident.     Time-based billing (NON-critical care).     46 minutes spent on total encounter. The necessity of the time spent during the encounter on this date of service was due to:     - Reviewing, and interpreting labs, testing, and imaging.  - Independently obtaining a review of systems and performing a physical exam  - Reviewing prior records and where necessary, outpatient records.  - Counselling and educating patient regarding interpretation of aforementioned items and plan of care.  - Does not include time spent on resident education.

## 2024-05-25 NOTE — PROGRESS NOTE ADULT - SUBJECTIVE AND OBJECTIVE BOX
***************************************************************  Namrata Cee, PGY-2  Internal Medicine   pager: [phone]  ***************************************************************    JENNIFER CANTOR  MRN: [id number]    Patient is a 83y old Male who presents with a chief complaint of fall, encephalopathy (24 May 2024 12:53)    Subjective: No acute events overnight.      MEDICATIONS  (STANDING):  carbidopa/levodopa  25/100 1 Tablet(s) Oral three times a day  chlorhexidine 4% Liquid 1 Application(s) Topical daily  dextrose 10% Bolus 125 milliLiter(s) IV Bolus once  dextrose 5%. 1000 milliLiter(s) (100 mL/Hr) IV Continuous <Continuous>  dextrose 5%. 1000 milliLiter(s) (50 mL/Hr) IV Continuous <Continuous>  dextrose 50% Injectable 25 Gram(s) IV Push once  dextrose 50% Injectable 12.5 Gram(s) IV Push once  dorzolamide 2%/timolol 0.5% Ophthalmic Solution 1 Drop(s) Both EYES two times a day  glucagon  Injectable 1 milliGRAM(s) IntraMuscular once  insulin lispro (ADMELOG) corrective regimen sliding scale   SubCutaneous three times a day before meals  insulin lispro (ADMELOG) corrective regimen sliding scale   SubCutaneous at bedtime  latanoprost 0.005% Ophthalmic Solution 1 Drop(s) Both EYES at bedtime  lisinopril 40 milliGRAM(s) Oral daily  piperacillin/tazobactam IVPB.. 3.375 Gram(s) IV Intermittent every 8 hours  polyethylene glycol 3350 17 Gram(s) Oral daily  simvastatin 40 milliGRAM(s) Oral at bedtime  tamsulosin 0.4 milliGRAM(s) Oral at bedtime  traZODone 50 milliGRAM(s) Oral at bedtime    MEDICATIONS  (PRN):  acetaminophen     Tablet .. 650 milliGRAM(s) Oral every 6 hours PRN Temp greater or equal to 38C (100.4F), Mild Pain (1 - 3)  aluminum hydroxide/magnesium hydroxide/simethicone Suspension 30 milliLiter(s) Oral every 4 hours PRN Dyspepsia  dextrose Oral Gel 15 Gram(s) Oral once PRN Blood Glucose LESS THAN 70 milliGRAM(s)/deciliter  melatonin 3 milliGRAM(s) Oral at bedtime PRN Insomnia  ondansetron Injectable 4 milliGRAM(s) IV Push every 8 hours PRN Nausea and/or Vomiting    Objective:  Vitals: Vital Signs Last 24 Hrs  T(C): 36.6 (05-25-24 @ 05:14), Max: 98.2 (05-24-24 @ 20:44)  T(F): 97.9 (05-25-24 @ 05:14), Max: 208.7 (05-24-24 @ 20:44)  HR: 97 (05-25-24 @ 05:14) (83 - 97)  BP: 139/84 (05-25-24 @ 05:14) (136/86 - 150/80)  RR: 19 (05-25-24 @ 05:14) (18 - 20)  SpO2: 100% (05-25-24 @ 05:14) (96% - 100%)               I&O's Summary    24 May 2024 07:01  -  25 May 2024 07:00  --------------------------------------------------------  IN: 1250 mL / OUT: 1510 mL / NET: -260 mL    PHYSICAL EXAM:  General: NAD, resting in bed, on RA  Lungs: clear to auscultation in anterior lung fields, no wheezes/rhonchi/rales  Heart: +s1/s2, RRR, no murmurs/gallops/rubs  Abdomen: soft, non-distended, non-tender to palpation  Extremities: +DP pulse bilaterally, no cyanosis/clubbing/edema  Neuro: AAOx3, no focal deficits    LABS:  05-24    136  |  103  |  14  ----------------------------<  118<H>  3.6   |  19<L>  |  0.98  05-23    134<L>  |  102  |  20  ----------------------------<  98  3.7   |  17<L>  |  1.09    Ca    8.8      24 May 2024 06:37  Ca    8.7      23 May 2024 07:01  Phos  2.3     05-24  Mg     1.6     05-24    Urinalysis Basic - ( 24 May 2024 06:37 )    Color: x / Appearance: x / SG: x / pH: x  Gluc: 118 mg/dL / Ketone: x  / Bili: x / Urobili: x   Blood: x / Protein: x / Nitrite: x   Leuk Esterase: x / RBC: x / WBC x   Sq Epi: x / Non Sq Epi: x / Bacteria: x                          11.8   15.15 )-----------( 140      ( 24 May 2024 06:37 )             35.0                         11.7   19.53 )-----------( 123      ( 23 May 2024 07:02 )             34.9     CAPILLARY BLOOD GLUCOSE:  POCT Blood Glucose.: 126 mg/dL (24 May 2024 21:54)  POCT Blood Glucose.: 120 mg/dL (24 May 2024 17:32)  POCT Blood Glucose.: 113 mg/dL (24 May 2024 12:55)  POCT Blood Glucose.: 132 mg/dL (24 May 2024 08:33)    RADIOLOGY & ADDITIONAL TESTS:    Imaging Personally Reviewed:  [x] YES  [ ] NO    Consultants involved in case:   Consultant(s) Notes Reviewed:  [x] YES  [ ] NO:   Care Discussed with Consultants/Other Providers [x] YES  [ ] NO ***************************************************************  Namrata Mike, PGY-2  Internal Medicine   pager: [phone]  ***************************************************************    JENNIFER CANTOR  MRN: [id number]    Patient is a 83y old Male who presents with a chief complaint of fall, encephalopathy (24 May 2024 12:53)    Subjective: No acute events overnight.  Gamal #[id number] used. AAOx2-3. Unable to answer further questions.      MEDICATIONS  (STANDING):  carbidopa/levodopa  25/100 1 Tablet(s) Oral three times a day  chlorhexidine 4% Liquid 1 Application(s) Topical daily  dextrose 10% Bolus 125 milliLiter(s) IV Bolus once  dextrose 5%. 1000 milliLiter(s) (100 mL/Hr) IV Continuous <Continuous>  dextrose 5%. 1000 milliLiter(s) (50 mL/Hr) IV Continuous <Continuous>  dextrose 50% Injectable 25 Gram(s) IV Push once  dextrose 50% Injectable 12.5 Gram(s) IV Push once  dorzolamide 2%/timolol 0.5% Ophthalmic Solution 1 Drop(s) Both EYES two times a day  glucagon  Injectable 1 milliGRAM(s) IntraMuscular once  insulin lispro (ADMELOG) corrective regimen sliding scale   SubCutaneous three times a day before meals  insulin lispro (ADMELOG) corrective regimen sliding scale   SubCutaneous at bedtime  latanoprost 0.005% Ophthalmic Solution 1 Drop(s) Both EYES at bedtime  lisinopril 40 milliGRAM(s) Oral daily  piperacillin/tazobactam IVPB.. 3.375 Gram(s) IV Intermittent every 8 hours  polyethylene glycol 3350 17 Gram(s) Oral daily  simvastatin 40 milliGRAM(s) Oral at bedtime  tamsulosin 0.4 milliGRAM(s) Oral at bedtime  traZODone 50 milliGRAM(s) Oral at bedtime    MEDICATIONS  (PRN):  acetaminophen     Tablet .. 650 milliGRAM(s) Oral every 6 hours PRN Temp greater or equal to 38C (100.4F), Mild Pain (1 - 3)  aluminum hydroxide/magnesium hydroxide/simethicone Suspension 30 milliLiter(s) Oral every 4 hours PRN Dyspepsia  dextrose Oral Gel 15 Gram(s) Oral once PRN Blood Glucose LESS THAN 70 milliGRAM(s)/deciliter  melatonin 3 milliGRAM(s) Oral at bedtime PRN Insomnia  ondansetron Injectable 4 milliGRAM(s) IV Push every 8 hours PRN Nausea and/or Vomiting    Objective:  Vitals: Vital Signs Last 24 Hrs  T(C): 36.6 (05-25-24 @ 05:14), Max: 98.2 (05-24-24 @ 20:44)  T(F): 97.9 (05-25-24 @ 05:14), Max: 208.7 (05-24-24 @ 20:44)  HR: 97 (05-25-24 @ 05:14) (83 - 97)  BP: 139/84 (05-25-24 @ 05:14) (136/86 - 150/80)  RR: 19 (05-25-24 @ 05:14) (18 - 20)  SpO2: 100% (05-25-24 @ 05:14) (96% - 100%)               I&O's Summary    24 May 2024 07:01  -  25 May 2024 07:00  --------------------------------------------------------  IN: 1250 mL / OUT: 1510 mL / NET: -260 mL    PHYSICAL EXAM:  General: NAD, resting in bed, on RA  Lungs: clear to auscultation in anterior lung fields, no wheezes  Heart: +s1/s2, RRR, no murmurs or gallops  Abdomen: soft, non-distended, non-tender to palpation  Extremities: no peripheral edema bilaterally      LABS:  05-24    136  |  103  |  14  ----------------------------<  118<H>  3.6   |  19<L>  |  0.98  05-23    134<L>  |  102  |  20  ----------------------------<  98  3.7   |  17<L>  |  1.09    Ca    8.8      24 May 2024 06:37  Ca    8.7      23 May 2024 07:01  Phos  2.3     05-24  Mg     1.6     05-24    Urinalysis Basic - ( 24 May 2024 06:37 )    Color: x / Appearance: x / SG: x / pH: x  Gluc: 118 mg/dL / Ketone: x  / Bili: x / Urobili: x   Blood: x / Protein: x / Nitrite: x   Leuk Esterase: x / RBC: x / WBC x   Sq Epi: x / Non Sq Epi: x / Bacteria: x                          11.8   15.15 )-----------( 140      ( 24 May 2024 06:37 )             35.0                         11.7   19.53 )-----------( 123      ( 23 May 2024 07:02 )             34.9     CAPILLARY BLOOD GLUCOSE:  POCT Blood Glucose.: 126 mg/dL (24 May 2024 21:54)  POCT Blood Glucose.: 120 mg/dL (24 May 2024 17:32)  POCT Blood Glucose.: 113 mg/dL (24 May 2024 12:55)  POCT Blood Glucose.: 132 mg/dL (24 May 2024 08:33)    RADIOLOGY & ADDITIONAL TESTS:    Imaging Personally Reviewed:  [x] YES  [ ] NO    Consultants involved in case:   Consultant(s) Notes Reviewed:  [x] YES  [ ] NO:   Care Discussed with Consultants/Other Providers [x] YES  [ ] NO

## 2024-05-25 NOTE — PROGRESS NOTE ADULT - PROBLEM SELECTOR PLAN 1
Patient meets severe sepsis criteria: WBC, Fever, Potential source and EOD (encephalopathy)  -failed oral cephalosporin, will conitnue with zoysn for pseudomonas and anaerobic coverage.  -Maintain MAP>65, no need to bolus given SBP>90  -follow up BCx, UCx (gram neg rods)  -once mental status improves, dicsusse with patient if he is still able to straight cath (difficulties per family from Parkinson) vs. schumacher vs. suprapubic catheter - met severe SIRS criteria on admission - WBC, Fever, Potential source and EOD (encephalopathy)  - failed oral cephalosporin, will continue with zoysn for pseudomonas and anaerobic coverage  - follow up BCx, UCx - pseudomonas, sensitivities pending  - once mental status improves, discuss with patient if he is still able to straight cath (difficulties per family from Parkinson) vs. schumacher vs. suprapubic catheter

## 2024-05-26 LAB
ANION GAP SERPL CALC-SCNC: 15 MMOL/L — SIGNIFICANT CHANGE UP (ref 5–17)
BUN SERPL-MCNC: 18 MG/DL — SIGNIFICANT CHANGE UP (ref 7–23)
CALCIUM SERPL-MCNC: 10 MG/DL — SIGNIFICANT CHANGE UP (ref 8.4–10.5)
CHLORIDE SERPL-SCNC: 102 MMOL/L — SIGNIFICANT CHANGE UP (ref 96–108)
CO2 SERPL-SCNC: 20 MMOL/L — LOW (ref 22–31)
CREAT SERPL-MCNC: 1.03 MG/DL — SIGNIFICANT CHANGE UP (ref 0.5–1.3)
EGFR: 71 ML/MIN/1.73M2 — SIGNIFICANT CHANGE UP
GLUCOSE BLDC GLUCOMTR-MCNC: 116 MG/DL — HIGH (ref 70–99)
GLUCOSE BLDC GLUCOMTR-MCNC: 120 MG/DL — HIGH (ref 70–99)
GLUCOSE BLDC GLUCOMTR-MCNC: 126 MG/DL — HIGH (ref 70–99)
GLUCOSE BLDC GLUCOMTR-MCNC: 162 MG/DL — HIGH (ref 70–99)
GLUCOSE SERPL-MCNC: 113 MG/DL — HIGH (ref 70–99)
HCT VFR BLD CALC: 39.4 % — SIGNIFICANT CHANGE UP (ref 39–50)
HGB BLD-MCNC: 13.6 G/DL — SIGNIFICANT CHANGE UP (ref 13–17)
MAGNESIUM SERPL-MCNC: 2.2 MG/DL — SIGNIFICANT CHANGE UP (ref 1.6–2.6)
MCHC RBC-ENTMCNC: 30.6 PG — SIGNIFICANT CHANGE UP (ref 27–34)
MCHC RBC-ENTMCNC: 34.5 GM/DL — SIGNIFICANT CHANGE UP (ref 32–36)
MCV RBC AUTO: 88.7 FL — SIGNIFICANT CHANGE UP (ref 80–100)
NRBC # BLD: 0 /100 WBCS — SIGNIFICANT CHANGE UP (ref 0–0)
PHOSPHATE SERPL-MCNC: 3.4 MG/DL — SIGNIFICANT CHANGE UP (ref 2.5–4.5)
PLATELET # BLD AUTO: 219 K/UL — SIGNIFICANT CHANGE UP (ref 150–400)
POTASSIUM SERPL-MCNC: 3.9 MMOL/L — SIGNIFICANT CHANGE UP (ref 3.5–5.3)
POTASSIUM SERPL-SCNC: 3.9 MMOL/L — SIGNIFICANT CHANGE UP (ref 3.5–5.3)
RBC # BLD: 4.44 M/UL — SIGNIFICANT CHANGE UP (ref 4.2–5.8)
RBC # FLD: 13.8 % — SIGNIFICANT CHANGE UP (ref 10.3–14.5)
SODIUM SERPL-SCNC: 137 MMOL/L — SIGNIFICANT CHANGE UP (ref 135–145)
WBC # BLD: 7.66 K/UL — SIGNIFICANT CHANGE UP (ref 3.8–10.5)
WBC # FLD AUTO: 7.66 K/UL — SIGNIFICANT CHANGE UP (ref 3.8–10.5)

## 2024-05-26 PROCEDURE — 99232 SBSQ HOSP IP/OBS MODERATE 35: CPT | Mod: GC

## 2024-05-26 RX ADMIN — DORZOLAMIDE HYDROCHLORIDE TIMOLOL MALEATE 1 DROP(S): 20; 5 SOLUTION/ DROPS OPHTHALMIC at 06:09

## 2024-05-26 RX ADMIN — PIPERACILLIN AND TAZOBACTAM 25 GRAM(S): 4; .5 INJECTION, POWDER, LYOPHILIZED, FOR SOLUTION INTRAVENOUS at 14:26

## 2024-05-26 RX ADMIN — CARBIDOPA AND LEVODOPA 1 TABLET(S): 25; 100 TABLET ORAL at 06:09

## 2024-05-26 RX ADMIN — Medication 3 MILLIGRAM(S): at 21:27

## 2024-05-26 RX ADMIN — Medication 1: at 17:38

## 2024-05-26 RX ADMIN — PIPERACILLIN AND TAZOBACTAM 25 GRAM(S): 4; .5 INJECTION, POWDER, LYOPHILIZED, FOR SOLUTION INTRAVENOUS at 21:26

## 2024-05-26 RX ADMIN — CARBIDOPA AND LEVODOPA 1 TABLET(S): 25; 100 TABLET ORAL at 14:23

## 2024-05-26 RX ADMIN — Medication 50 MILLIGRAM(S): at 21:27

## 2024-05-26 RX ADMIN — DORZOLAMIDE HYDROCHLORIDE TIMOLOL MALEATE 1 DROP(S): 20; 5 SOLUTION/ DROPS OPHTHALMIC at 17:34

## 2024-05-26 RX ADMIN — CARBIDOPA AND LEVODOPA 1 TABLET(S): 25; 100 TABLET ORAL at 21:27

## 2024-05-26 RX ADMIN — LATANOPROST 1 DROP(S): 0.05 SOLUTION/ DROPS OPHTHALMIC; TOPICAL at 21:28

## 2024-05-26 RX ADMIN — ENOXAPARIN SODIUM 40 MILLIGRAM(S): 100 INJECTION SUBCUTANEOUS at 17:30

## 2024-05-26 RX ADMIN — SIMVASTATIN 40 MILLIGRAM(S): 20 TABLET, FILM COATED ORAL at 21:27

## 2024-05-26 RX ADMIN — POLYETHYLENE GLYCOL 3350 17 GRAM(S): 17 POWDER, FOR SOLUTION ORAL at 10:19

## 2024-05-26 RX ADMIN — PIPERACILLIN AND TAZOBACTAM 25 GRAM(S): 4; .5 INJECTION, POWDER, LYOPHILIZED, FOR SOLUTION INTRAVENOUS at 06:10

## 2024-05-26 RX ADMIN — TAMSULOSIN HYDROCHLORIDE 0.4 MILLIGRAM(S): 0.4 CAPSULE ORAL at 21:27

## 2024-05-26 RX ADMIN — CHLORHEXIDINE GLUCONATE 1 APPLICATION(S): 213 SOLUTION TOPICAL at 10:16

## 2024-05-26 RX ADMIN — LISINOPRIL 40 MILLIGRAM(S): 2.5 TABLET ORAL at 06:09

## 2024-05-26 NOTE — PROGRESS NOTE ADULT - PROBLEM SELECTOR PLAN 1
- met severe SIRS criteria on admission - WBC, Fever, Potential source and EOD (encephalopathy)  - failed oral cephalosporin, will continue with zoysn for pseudomonas and anaerobic coverage  - follow up BCx, UCx - pseudomonas, sensitivities pending  - once mental status improves, discuss with patient if he is still able to straight cath (difficulties per family from Parkinson) vs. schumacher vs. suprapubic catheter - met severe SIRS criteria on admission - WBC, Fever, Potential source and EOD (encephalopathy)  - failed oral cephalosporin, will continue with zoysn  - follow up BCx, UCx - pseudomonas, pan-sensitive  - once mental status improves, discuss with patient if he is still able to straight cath (difficulties per family from Parkinson) vs. schumacher vs. suprapubic catheter - met severe SIRS criteria on admission - WBC, Fever, Potential source and EOD (encephalopathy)  - failed oral cephalosporin, will continue with zoysn  - follow up BCx, UCx - pseudomonas, pan-sensitive; will transition to po cipro on dc   - once mental status improves, discuss with patient if he is still able to straight cath (difficulties per family from Parkinson) vs. schumacher vs. suprapubic catheter

## 2024-05-26 NOTE — PROGRESS NOTE ADULT - ATTENDING COMMENTS
84 yo male PMHx Parkinson, BPH with straight cath at home admitted with severe sepsis 2/2 UTI    # Sepsis/UTI: Cont with empiric abx- Zosyn.   UCx with Pseudomonas. Awaiting ID and sensitivities   BCx NGTD    # Urinary retention: Patient self-catheterizes at home. He is voiding now. PVR checked on 5/26 - 24 cc. Cont with Flomax.     # HTN: c/w Lisinopril. Monitor BP and resume when stable    # Parkinson : Cont with Sinemet     PT eval-RADHAMES   Rest of care as above   Discussed with resident.     Time-based billing (NON-critical care).     43 minutes spent on total encounter. The necessity of the time spent during the encounter on this date of service was due to:     - Reviewing, and interpreting labs, testing, and imaging.  - Independently obtaining a review of systems and performing a physical exam  - Reviewing prior records and where necessary, outpatient records.  - Counselling and educating patient regarding interpretation of aforementioned items and plan of care.  - Does not include time spent on resident education.

## 2024-05-26 NOTE — PROGRESS NOTE ADULT - SUBJECTIVE AND OBJECTIVE BOX
*******************************  Rogerio Stone MD (PGY-1)  Internal Medicine  Contact via [employer] TEAMS  *******************************    PROGRESS NOTE:     Patient is a 83y old  Male who presents with a chief complaint of fall, encephalopathy (25 May 2024 07:08)      INTERVAL EVENTS: No acute overnight events.     SUBJECTIVE: Patient seen and examined at bedside. This morning, the patient is comfortable and doing well. No acute complaints. Denies fevers, chills, N/V/D, chest pain, SOB, abdominal pain.    MEDICATIONS  (STANDING):  carbidopa/levodopa  25/100 1 Tablet(s) Oral three times a day  chlorhexidine 4% Liquid 1 Application(s) Topical daily  dextrose 10% Bolus 125 milliLiter(s) IV Bolus once  dextrose 5%. 1000 milliLiter(s) (100 mL/Hr) IV Continuous <Continuous>  dextrose 5%. 1000 milliLiter(s) (50 mL/Hr) IV Continuous <Continuous>  dextrose 50% Injectable 25 Gram(s) IV Push once  dextrose 50% Injectable 12.5 Gram(s) IV Push once  dorzolamide 2%/timolol 0.5% Ophthalmic Solution 1 Drop(s) Both EYES two times a day  enoxaparin Injectable 40 milliGRAM(s) SubCutaneous every 24 hours  glucagon  Injectable 1 milliGRAM(s) IntraMuscular once  insulin lispro (ADMELOG) corrective regimen sliding scale   SubCutaneous at bedtime  insulin lispro (ADMELOG) corrective regimen sliding scale   SubCutaneous three times a day before meals  latanoprost 0.005% Ophthalmic Solution 1 Drop(s) Both EYES at bedtime  lisinopril 40 milliGRAM(s) Oral daily  piperacillin/tazobactam IVPB.. 3.375 Gram(s) IV Intermittent every 8 hours  polyethylene glycol 3350 17 Gram(s) Oral daily  simvastatin 40 milliGRAM(s) Oral at bedtime  tamsulosin 0.4 milliGRAM(s) Oral at bedtime  traZODone 50 milliGRAM(s) Oral at bedtime    MEDICATIONS  (PRN):  acetaminophen     Tablet .. 650 milliGRAM(s) Oral every 6 hours PRN Temp greater or equal to 38C (100.4F), Mild Pain (1 - 3)  aluminum hydroxide/magnesium hydroxide/simethicone Suspension 30 milliLiter(s) Oral every 4 hours PRN Dyspepsia  dextrose Oral Gel 15 Gram(s) Oral once PRN Blood Glucose LESS THAN 70 milliGRAM(s)/deciliter  melatonin 3 milliGRAM(s) Oral at bedtime PRN Insomnia  ondansetron Injectable 4 milliGRAM(s) IV Push every 8 hours PRN Nausea and/or Vomiting  simethicone 80 milliGRAM(s) Chew three times a day PRN Gas      CAPILLARY BLOOD GLUCOSE      POCT Blood Glucose.: 114 mg/dL (25 May 2024 21:53)  POCT Blood Glucose.: 124 mg/dL (25 May 2024 17:03)  POCT Blood Glucose.: 106 mg/dL (25 May 2024 11:59)  POCT Blood Glucose.: 116 mg/dL (25 May 2024 08:42)    I&O's Summary    24 May 2024 07:01  -  25 May 2024 07:00  --------------------------------------------------------  IN: 1250 mL / OUT: 1510 mL / NET: -260 mL    25 May 2024 07:01  -  26 May 2024 06:50  --------------------------------------------------------  IN: 1140 mL / OUT: 1100 mL / NET: 40 mL        PHYSICAL EXAM:  Vital Signs Last 24 Hrs  T(C): 36.6 (26 May 2024 04:30), Max: 36.6 (26 May 2024 04:30)  T(F): 97.9 (26 May 2024 04:30), Max: 97.9 (26 May 2024 04:30)  HR: 79 (26 May 2024 04:30) (79 - 97)  BP: 144/78 (26 May 2024 04:30) (133/85 - 145/67)  BP(mean): --  RR: 19 (26 May 2024 04:30) (18 - 19)  SpO2: 100% (26 May 2024 04:30) (97% - 100%)    Parameters below as of 26 May 2024 04:30  Patient On (Oxygen Delivery Method): room air        PHYSICAL EXAM:  General: NAD, resting in bed, on RA  Lungs: clear to auscultation in anterior lung fields, no wheezes  Heart: +s1/s2, RRR, no murmurs or gallops  Abdomen: soft, non-distended, non-tender to palpation  Extremities: no peripheral edema bilaterall    LABS:                        12.7   9.17  )-----------( 189      ( 25 May 2024 07:01 )             37.3     05-25    137  |  103  |  17  ----------------------------<  115<H>  3.8   |  18<L>  |  1.00    Ca    9.5      25 May 2024 06:57  Phos  3.1     05-25  Mg     2.0     05-25            Urinalysis Basic - ( 25 May 2024 06:57 )    Color: x / Appearance: x / SG: x / pH: x  Gluc: 115 mg/dL / Ketone: x  / Bili: x / Urobili: x   Blood: x / Protein: x / Nitrite: x   Leuk Esterase: x / RBC: x / WBC x   Sq Epi: x / Non Sq Epi: x / Bacteria: x          RADIOLOGY & ADDITIONAL TESTS:  Results Reviewed:   Imaging Personally Reviewed:  Electrocardiogram Personally Reviewed:  Tele: *******************************  Rogerio Stone MD (PGY-1)  Internal Medicine  Contact via [employer] TEAMS  *******************************    PROGRESS NOTE:     Patient is a 83y old  Male who presents with a chief complaint of fall, encephalopathy (25 May 2024 07:08)      INTERVAL EVENTS: No acute overnight events.     SUBJECTIVE: Patient seen and examined at bedside. W/ some dysuria. Denies fevers, chills, N/V/D, chest pain, SOB, abdominal pain.    MEDICATIONS  (STANDING):  carbidopa/levodopa  25/100 1 Tablet(s) Oral three times a day  chlorhexidine 4% Liquid 1 Application(s) Topical daily  dextrose 10% Bolus 125 milliLiter(s) IV Bolus once  dextrose 5%. 1000 milliLiter(s) (100 mL/Hr) IV Continuous <Continuous>  dextrose 5%. 1000 milliLiter(s) (50 mL/Hr) IV Continuous <Continuous>  dextrose 50% Injectable 25 Gram(s) IV Push once  dextrose 50% Injectable 12.5 Gram(s) IV Push once  dorzolamide 2%/timolol 0.5% Ophthalmic Solution 1 Drop(s) Both EYES two times a day  enoxaparin Injectable 40 milliGRAM(s) SubCutaneous every 24 hours  glucagon  Injectable 1 milliGRAM(s) IntraMuscular once  insulin lispro (ADMELOG) corrective regimen sliding scale   SubCutaneous at bedtime  insulin lispro (ADMELOG) corrective regimen sliding scale   SubCutaneous three times a day before meals  latanoprost 0.005% Ophthalmic Solution 1 Drop(s) Both EYES at bedtime  lisinopril 40 milliGRAM(s) Oral daily  piperacillin/tazobactam IVPB.. 3.375 Gram(s) IV Intermittent every 8 hours  polyethylene glycol 3350 17 Gram(s) Oral daily  simvastatin 40 milliGRAM(s) Oral at bedtime  tamsulosin 0.4 milliGRAM(s) Oral at bedtime  traZODone 50 milliGRAM(s) Oral at bedtime    MEDICATIONS  (PRN):  acetaminophen     Tablet .. 650 milliGRAM(s) Oral every 6 hours PRN Temp greater or equal to 38C (100.4F), Mild Pain (1 - 3)  aluminum hydroxide/magnesium hydroxide/simethicone Suspension 30 milliLiter(s) Oral every 4 hours PRN Dyspepsia  dextrose Oral Gel 15 Gram(s) Oral once PRN Blood Glucose LESS THAN 70 milliGRAM(s)/deciliter  melatonin 3 milliGRAM(s) Oral at bedtime PRN Insomnia  ondansetron Injectable 4 milliGRAM(s) IV Push every 8 hours PRN Nausea and/or Vomiting  simethicone 80 milliGRAM(s) Chew three times a day PRN Gas      CAPILLARY BLOOD GLUCOSE      POCT Blood Glucose.: 114 mg/dL (25 May 2024 21:53)  POCT Blood Glucose.: 124 mg/dL (25 May 2024 17:03)  POCT Blood Glucose.: 106 mg/dL (25 May 2024 11:59)  POCT Blood Glucose.: 116 mg/dL (25 May 2024 08:42)    I&O's Summary    24 May 2024 07:01  -  25 May 2024 07:00  --------------------------------------------------------  IN: 1250 mL / OUT: 1510 mL / NET: -260 mL    25 May 2024 07:01  -  26 May 2024 06:50  --------------------------------------------------------  IN: 1140 mL / OUT: 1100 mL / NET: 40 mL        PHYSICAL EXAM:  Vital Signs Last 24 Hrs  T(C): 36.6 (26 May 2024 04:30), Max: 36.6 (26 May 2024 04:30)  T(F): 97.9 (26 May 2024 04:30), Max: 97.9 (26 May 2024 04:30)  HR: 79 (26 May 2024 04:30) (79 - 97)  BP: 144/78 (26 May 2024 04:30) (133/85 - 145/67)  BP(mean): --  RR: 19 (26 May 2024 04:30) (18 - 19)  SpO2: 100% (26 May 2024 04:30) (97% - 100%)    Parameters below as of 26 May 2024 04:30  Patient On (Oxygen Delivery Method): room air        PHYSICAL EXAM:  General: NAD, resting in bed, on RA  Lungs: clear to auscultation in anterior lung fields, no wheezes  Heart: +s1/s2, RRR, no murmurs or gallops  Abdomen: soft, non-distended, non-tender to palpation  Extremities: no peripheral edema bilaterall  Neuro: A&Ox2; tremor in b/l UE. non-focal    LABS:                        12.7   9.17  )-----------( 189      ( 25 May 2024 07:01 )             37.3     05-25    137  |  103  |  17  ----------------------------<  115<H>  3.8   |  18<L>  |  1.00    Ca    9.5      25 May 2024 06:57  Phos  3.1     05-25  Mg     2.0     05-25            Urinalysis Basic - ( 25 May 2024 06:57 )    Color: x / Appearance: x / SG: x / pH: x  Gluc: 115 mg/dL / Ketone: x  / Bili: x / Urobili: x   Blood: x / Protein: x / Nitrite: x   Leuk Esterase: x / RBC: x / WBC x   Sq Epi: x / Non Sq Epi: x / Bacteria: x          RADIOLOGY & ADDITIONAL TESTS:  Results Reviewed:   Imaging Personally Reviewed:  Electrocardiogram Personally Reviewed:  Tele:

## 2024-05-27 LAB
ANION GAP SERPL CALC-SCNC: 14 MMOL/L — SIGNIFICANT CHANGE UP (ref 5–17)
BUN SERPL-MCNC: 24 MG/DL — HIGH (ref 7–23)
CALCIUM SERPL-MCNC: 9.5 MG/DL — SIGNIFICANT CHANGE UP (ref 8.4–10.5)
CHLORIDE SERPL-SCNC: 103 MMOL/L — SIGNIFICANT CHANGE UP (ref 96–108)
CO2 SERPL-SCNC: 20 MMOL/L — LOW (ref 22–31)
CREAT SERPL-MCNC: 1.08 MG/DL — SIGNIFICANT CHANGE UP (ref 0.5–1.3)
CULTURE RESULTS: SIGNIFICANT CHANGE UP
EGFR: 67 ML/MIN/1.73M2 — SIGNIFICANT CHANGE UP
GLUCOSE BLDC GLUCOMTR-MCNC: 107 MG/DL — HIGH (ref 70–99)
GLUCOSE BLDC GLUCOMTR-MCNC: 107 MG/DL — HIGH (ref 70–99)
GLUCOSE BLDC GLUCOMTR-MCNC: 118 MG/DL — HIGH (ref 70–99)
GLUCOSE BLDC GLUCOMTR-MCNC: 135 MG/DL — HIGH (ref 70–99)
GLUCOSE SERPL-MCNC: 101 MG/DL — HIGH (ref 70–99)
HCT VFR BLD CALC: 36 % — LOW (ref 39–50)
HGB BLD-MCNC: 12.6 G/DL — LOW (ref 13–17)
MAGNESIUM SERPL-MCNC: 2.1 MG/DL — SIGNIFICANT CHANGE UP (ref 1.6–2.6)
MCHC RBC-ENTMCNC: 31.1 PG — SIGNIFICANT CHANGE UP (ref 27–34)
MCHC RBC-ENTMCNC: 35 GM/DL — SIGNIFICANT CHANGE UP (ref 32–36)
MCV RBC AUTO: 88.9 FL — SIGNIFICANT CHANGE UP (ref 80–100)
NRBC # BLD: 0 /100 WBCS — SIGNIFICANT CHANGE UP (ref 0–0)
PHOSPHATE SERPL-MCNC: 3.6 MG/DL — SIGNIFICANT CHANGE UP (ref 2.5–4.5)
PLATELET # BLD AUTO: 240 K/UL — SIGNIFICANT CHANGE UP (ref 150–400)
POTASSIUM SERPL-MCNC: 3.9 MMOL/L — SIGNIFICANT CHANGE UP (ref 3.5–5.3)
POTASSIUM SERPL-SCNC: 3.9 MMOL/L — SIGNIFICANT CHANGE UP (ref 3.5–5.3)
RBC # BLD: 4.05 M/UL — LOW (ref 4.2–5.8)
RBC # FLD: 14 % — SIGNIFICANT CHANGE UP (ref 10.3–14.5)
SODIUM SERPL-SCNC: 137 MMOL/L — SIGNIFICANT CHANGE UP (ref 135–145)
SPECIMEN SOURCE: SIGNIFICANT CHANGE UP
WBC # BLD: 7.63 K/UL — SIGNIFICANT CHANGE UP (ref 3.8–10.5)
WBC # FLD AUTO: 7.63 K/UL — SIGNIFICANT CHANGE UP (ref 3.8–10.5)

## 2024-05-27 PROCEDURE — 99232 SBSQ HOSP IP/OBS MODERATE 35: CPT | Mod: GC

## 2024-05-27 RX ORDER — LACTULOSE 10 G/15ML
10 SOLUTION ORAL ONCE
Refills: 0 | Status: COMPLETED | OUTPATIENT
Start: 2024-05-27 | End: 2024-05-27

## 2024-05-27 RX ADMIN — DORZOLAMIDE HYDROCHLORIDE TIMOLOL MALEATE 1 DROP(S): 20; 5 SOLUTION/ DROPS OPHTHALMIC at 17:32

## 2024-05-27 RX ADMIN — PIPERACILLIN AND TAZOBACTAM 25 GRAM(S): 4; .5 INJECTION, POWDER, LYOPHILIZED, FOR SOLUTION INTRAVENOUS at 21:30

## 2024-05-27 RX ADMIN — CARBIDOPA AND LEVODOPA 1 TABLET(S): 25; 100 TABLET ORAL at 21:29

## 2024-05-27 RX ADMIN — ENOXAPARIN SODIUM 40 MILLIGRAM(S): 100 INJECTION SUBCUTANEOUS at 17:31

## 2024-05-27 RX ADMIN — PIPERACILLIN AND TAZOBACTAM 25 GRAM(S): 4; .5 INJECTION, POWDER, LYOPHILIZED, FOR SOLUTION INTRAVENOUS at 05:45

## 2024-05-27 RX ADMIN — LATANOPROST 1 DROP(S): 0.05 SOLUTION/ DROPS OPHTHALMIC; TOPICAL at 21:30

## 2024-05-27 RX ADMIN — LACTULOSE 10 GRAM(S): 10 SOLUTION ORAL at 12:32

## 2024-05-27 RX ADMIN — CARBIDOPA AND LEVODOPA 1 TABLET(S): 25; 100 TABLET ORAL at 15:31

## 2024-05-27 RX ADMIN — TAMSULOSIN HYDROCHLORIDE 0.4 MILLIGRAM(S): 0.4 CAPSULE ORAL at 21:30

## 2024-05-27 RX ADMIN — LISINOPRIL 40 MILLIGRAM(S): 2.5 TABLET ORAL at 05:45

## 2024-05-27 RX ADMIN — DORZOLAMIDE HYDROCHLORIDE TIMOLOL MALEATE 1 DROP(S): 20; 5 SOLUTION/ DROPS OPHTHALMIC at 05:49

## 2024-05-27 RX ADMIN — CHLORHEXIDINE GLUCONATE 1 APPLICATION(S): 213 SOLUTION TOPICAL at 12:33

## 2024-05-27 RX ADMIN — SIMVASTATIN 40 MILLIGRAM(S): 20 TABLET, FILM COATED ORAL at 21:30

## 2024-05-27 RX ADMIN — PIPERACILLIN AND TAZOBACTAM 25 GRAM(S): 4; .5 INJECTION, POWDER, LYOPHILIZED, FOR SOLUTION INTRAVENOUS at 15:31

## 2024-05-27 RX ADMIN — POLYETHYLENE GLYCOL 3350 17 GRAM(S): 17 POWDER, FOR SOLUTION ORAL at 12:32

## 2024-05-27 RX ADMIN — Medication 50 MILLIGRAM(S): at 21:30

## 2024-05-27 RX ADMIN — CARBIDOPA AND LEVODOPA 1 TABLET(S): 25; 100 TABLET ORAL at 05:45

## 2024-05-27 NOTE — PROGRESS NOTE ADULT - SUBJECTIVE AND OBJECTIVE BOX
*******************************  Rogerio Stone MD (PGY-1)  Internal Medicine  Contact via [employer] TEAMS  *******************************    PROGRESS NOTE:     Patient is a 83y old  Male who presents with a chief complaint of fall, encephalopathy (26 May 2024 06:49)      INTERVAL EVENTS: No acute overnight events.     SUBJECTIVE: Patient seen and examined at bedside. This morning, the patient is comfortable and doing well. No acute complaints. Denies fevers, chills, N/V/D, chest pain, SOB, abdominal pain.    MEDICATIONS  (STANDING):  carbidopa/levodopa  25/100 1 Tablet(s) Oral three times a day  chlorhexidine 4% Liquid 1 Application(s) Topical daily  dextrose 10% Bolus 125 milliLiter(s) IV Bolus once  dextrose 5%. 1000 milliLiter(s) (100 mL/Hr) IV Continuous <Continuous>  dextrose 5%. 1000 milliLiter(s) (50 mL/Hr) IV Continuous <Continuous>  dextrose 50% Injectable 25 Gram(s) IV Push once  dextrose 50% Injectable 12.5 Gram(s) IV Push once  dorzolamide 2%/timolol 0.5% Ophthalmic Solution 1 Drop(s) Both EYES two times a day  enoxaparin Injectable 40 milliGRAM(s) SubCutaneous every 24 hours  glucagon  Injectable 1 milliGRAM(s) IntraMuscular once  insulin lispro (ADMELOG) corrective regimen sliding scale   SubCutaneous three times a day before meals  insulin lispro (ADMELOG) corrective regimen sliding scale   SubCutaneous at bedtime  latanoprost 0.005% Ophthalmic Solution 1 Drop(s) Both EYES at bedtime  lisinopril 40 milliGRAM(s) Oral daily  piperacillin/tazobactam IVPB.. 3.375 Gram(s) IV Intermittent every 8 hours  polyethylene glycol 3350 17 Gram(s) Oral daily  simvastatin 40 milliGRAM(s) Oral at bedtime  tamsulosin 0.4 milliGRAM(s) Oral at bedtime  traZODone 50 milliGRAM(s) Oral at bedtime    MEDICATIONS  (PRN):  acetaminophen     Tablet .. 650 milliGRAM(s) Oral every 6 hours PRN Temp greater or equal to 38C (100.4F), Mild Pain (1 - 3)  aluminum hydroxide/magnesium hydroxide/simethicone Suspension 30 milliLiter(s) Oral every 4 hours PRN Dyspepsia  dextrose Oral Gel 15 Gram(s) Oral once PRN Blood Glucose LESS THAN 70 milliGRAM(s)/deciliter  melatonin 3 milliGRAM(s) Oral at bedtime PRN Insomnia  ondansetron Injectable 4 milliGRAM(s) IV Push every 8 hours PRN Nausea and/or Vomiting  simethicone 80 milliGRAM(s) Chew three times a day PRN Gas      CAPILLARY BLOOD GLUCOSE      POCT Blood Glucose.: 126 mg/dL (26 May 2024 22:21)  POCT Blood Glucose.: 162 mg/dL (26 May 2024 17:31)  POCT Blood Glucose.: 120 mg/dL (26 May 2024 11:59)  POCT Blood Glucose.: 116 mg/dL (26 May 2024 08:52)    I&O's Summary    25 May 2024 07:01  -  26 May 2024 07:00  --------------------------------------------------------  IN: 1140 mL / OUT: 1100 mL / NET: 40 mL    26 May 2024 07:01  -  27 May 2024 06:46  --------------------------------------------------------  IN: 100 mL / OUT: 400 mL / NET: -300 mL        PHYSICAL EXAM:  Vital Signs Last 24 Hrs  T(C): 36.7 (27 May 2024 04:20), Max: 37 (26 May 2024 21:57)  T(F): 98 (27 May 2024 04:20), Max: 98.6 (26 May 2024 21:57)  HR: 89 (27 May 2024 04:20) (85 - 100)  BP: 143/90 (27 May 2024 04:20) (139/87 - 155/88)  BP(mean): --  RR: 18 (27 May 2024 04:20) (18 - 18)  SpO2: 97% (27 May 2024 04:20) (97% - 99%)    Parameters below as of 27 May 2024 04:20  Patient On (Oxygen Delivery Method): room air        PHYSICAL EXAM:  General: NAD, resting in bed, on RA  Lungs: clear to auscultation in anterior lung fields, no wheezes  Heart: +s1/s2, RRR, no murmurs or gallops  Abdomen: soft, non-distended, non-tender to palpation  Extremities: no peripheral edema bilaterall  Neuro: A&Ox2; tremor in b/l UE. non-focal    LABS:                        13.6   7.66  )-----------( 219      ( 26 May 2024 07:15 )             39.4     05-26    137  |  102  |  18  ----------------------------<  113<H>  3.9   |  20<L>  |  1.03    Ca    10.0      26 May 2024 07:15  Phos  3.4     05-26  Mg     2.2     05-26            Urinalysis Basic - ( 26 May 2024 07:15 )    Color: x / Appearance: x / SG: x / pH: x  Gluc: 113 mg/dL / Ketone: x  / Bili: x / Urobili: x   Blood: x / Protein: x / Nitrite: x   Leuk Esterase: x / RBC: x / WBC x   Sq Epi: x / Non Sq Epi: x / Bacteria: x          RADIOLOGY & ADDITIONAL TESTS:  Results Reviewed:   Imaging Personally Reviewed:  Electrocardiogram Personally Reviewed:  Tele: *******************************  Rogerio Stone MD (PGY-1)  Internal Medicine  Contact via [employer] TEAMS  *******************************    PROGRESS NOTE:     Patient is a 83y old  Male who presents with a chief complaint of fall, encephalopathy (26 May 2024 06:49)      INTERVAL EVENTS: No acute overnight events.     SUBJECTIVE: Patient seen and examined at bedside. Some dysuria. Denies fevers, chills, N/V/D, chest pain, SOB, abdominal pain.    MEDICATIONS  (STANDING):  carbidopa/levodopa  25/100 1 Tablet(s) Oral three times a day  chlorhexidine 4% Liquid 1 Application(s) Topical daily  dextrose 10% Bolus 125 milliLiter(s) IV Bolus once  dextrose 5%. 1000 milliLiter(s) (100 mL/Hr) IV Continuous <Continuous>  dextrose 5%. 1000 milliLiter(s) (50 mL/Hr) IV Continuous <Continuous>  dextrose 50% Injectable 25 Gram(s) IV Push once  dextrose 50% Injectable 12.5 Gram(s) IV Push once  dorzolamide 2%/timolol 0.5% Ophthalmic Solution 1 Drop(s) Both EYES two times a day  enoxaparin Injectable 40 milliGRAM(s) SubCutaneous every 24 hours  glucagon  Injectable 1 milliGRAM(s) IntraMuscular once  insulin lispro (ADMELOG) corrective regimen sliding scale   SubCutaneous three times a day before meals  insulin lispro (ADMELOG) corrective regimen sliding scale   SubCutaneous at bedtime  latanoprost 0.005% Ophthalmic Solution 1 Drop(s) Both EYES at bedtime  lisinopril 40 milliGRAM(s) Oral daily  piperacillin/tazobactam IVPB.. 3.375 Gram(s) IV Intermittent every 8 hours  polyethylene glycol 3350 17 Gram(s) Oral daily  simvastatin 40 milliGRAM(s) Oral at bedtime  tamsulosin 0.4 milliGRAM(s) Oral at bedtime  traZODone 50 milliGRAM(s) Oral at bedtime    MEDICATIONS  (PRN):  acetaminophen     Tablet .. 650 milliGRAM(s) Oral every 6 hours PRN Temp greater or equal to 38C (100.4F), Mild Pain (1 - 3)  aluminum hydroxide/magnesium hydroxide/simethicone Suspension 30 milliLiter(s) Oral every 4 hours PRN Dyspepsia  dextrose Oral Gel 15 Gram(s) Oral once PRN Blood Glucose LESS THAN 70 milliGRAM(s)/deciliter  melatonin 3 milliGRAM(s) Oral at bedtime PRN Insomnia  ondansetron Injectable 4 milliGRAM(s) IV Push every 8 hours PRN Nausea and/or Vomiting  simethicone 80 milliGRAM(s) Chew three times a day PRN Gas      CAPILLARY BLOOD GLUCOSE      POCT Blood Glucose.: 126 mg/dL (26 May 2024 22:21)  POCT Blood Glucose.: 162 mg/dL (26 May 2024 17:31)  POCT Blood Glucose.: 120 mg/dL (26 May 2024 11:59)  POCT Blood Glucose.: 116 mg/dL (26 May 2024 08:52)    I&O's Summary    25 May 2024 07:01  -  26 May 2024 07:00  --------------------------------------------------------  IN: 1140 mL / OUT: 1100 mL / NET: 40 mL    26 May 2024 07:01  -  27 May 2024 06:46  --------------------------------------------------------  IN: 100 mL / OUT: 400 mL / NET: -300 mL        PHYSICAL EXAM:  Vital Signs Last 24 Hrs  T(C): 36.7 (27 May 2024 04:20), Max: 37 (26 May 2024 21:57)  T(F): 98 (27 May 2024 04:20), Max: 98.6 (26 May 2024 21:57)  HR: 89 (27 May 2024 04:20) (85 - 100)  BP: 143/90 (27 May 2024 04:20) (139/87 - 155/88)  BP(mean): --  RR: 18 (27 May 2024 04:20) (18 - 18)  SpO2: 97% (27 May 2024 04:20) (97% - 99%)    Parameters below as of 27 May 2024 04:20  Patient On (Oxygen Delivery Method): room air        PHYSICAL EXAM:  General: NAD, resting in bed  Lungs: clear to auscultation b/l  Heart: +s1/s2, RRR, no murmurs or gallops  Abdomen: soft, non-distended, non-tender to palpation  Extremities: no peripheral edema  Neuro: A&Ox3; tremor in b/l UE. non-focal    LABS:                        13.6   7.66  )-----------( 219      ( 26 May 2024 07:15 )             39.4     05-26    137  |  102  |  18  ----------------------------<  113<H>  3.9   |  20<L>  |  1.03    Ca    10.0      26 May 2024 07:15  Phos  3.4     05-26  Mg     2.2     05-26            Urinalysis Basic - ( 26 May 2024 07:15 )    Color: x / Appearance: x / SG: x / pH: x  Gluc: 113 mg/dL / Ketone: x  / Bili: x / Urobili: x   Blood: x / Protein: x / Nitrite: x   Leuk Esterase: x / RBC: x / WBC x   Sq Epi: x / Non Sq Epi: x / Bacteria: x          RADIOLOGY & ADDITIONAL TESTS:  Results Reviewed:   Imaging Personally Reviewed:  Electrocardiogram Personally Reviewed:  Tele:

## 2024-05-27 NOTE — PROGRESS NOTE ADULT - PROBLEM SELECTOR PLAN 1
- met severe SIRS criteria on admission - WBC, Fever, Potential source and EOD (encephalopathy)  - failed oral cephalosporin, will continue with zoysn  - follow up BCx, UCx - pseudomonas, pan-sensitive; will transition to po cipro on dc   - once mental status improves, discuss with patient if he is still able to straight cath (difficulties per family from Parkinson) vs. schumacher vs. suprapubic catheter

## 2024-05-27 NOTE — PROGRESS NOTE ADULT - ATTENDING COMMENTS
82 yo male PMHx Parkinson, BPH with straight cath at home admitted with severe sepsis 2/2 UTI    # Sepsis/UTI: Cont with empiric abx- Zosyn (5/22 - ). Transition to Cipro on DC for total 7 days.   UCx with Pseudomonas.    BCx NGTD    # Urinary retention: Patient self-catheterizes at home. He is voiding now. PVR checked on 5/26 - 24 cc. Cont with Flomax.     # HTN: c/w Lisinopril. Monitor BP      # Parkinson : Cont with Sinemet     #Constipation: Lactulose 5/27. Patient reports last BM one week ago.     PT [facility]. Pending placement.  Rest of care as above   Discussed with resident.     Time-based billing (NON-critical care).     42 minutes spent on total encounter. The necessity of the time spent during the encounter on this date of service was due to:     - Reviewing, and interpreting labs, testing, and imaging.  - Independently obtaining a review of systems and performing a physical exam  - Reviewing prior records and where necessary, outpatient records.  - Counselling and educating patient regarding interpretation of aforementioned items and plan of care.  - Does not include time spent on resident education.

## 2024-05-28 ENCOUNTER — TRANSCRIPTION ENCOUNTER (OUTPATIENT)
Age: 84
End: 2024-05-28

## 2024-05-28 VITALS
SYSTOLIC BLOOD PRESSURE: 148 MMHG | HEART RATE: 89 BPM | OXYGEN SATURATION: 99 % | RESPIRATION RATE: 19 BRPM | TEMPERATURE: 98 F | DIASTOLIC BLOOD PRESSURE: 84 MMHG

## 2024-05-28 LAB
GLUCOSE BLDC GLUCOMTR-MCNC: 116 MG/DL — HIGH (ref 70–99)
GLUCOSE BLDC GLUCOMTR-MCNC: 132 MG/DL — HIGH (ref 70–99)

## 2024-05-28 PROCEDURE — 74177 CT ABD & PELVIS W/CONTRAST: CPT | Mod: MC

## 2024-05-28 PROCEDURE — 99285 EMERGENCY DEPT VISIT HI MDM: CPT

## 2024-05-28 PROCEDURE — 87040 BLOOD CULTURE FOR BACTERIA: CPT

## 2024-05-28 PROCEDURE — 84295 ASSAY OF SERUM SODIUM: CPT

## 2024-05-28 PROCEDURE — 82330 ASSAY OF CALCIUM: CPT

## 2024-05-28 PROCEDURE — 87640 STAPH A DNA AMP PROBE: CPT

## 2024-05-28 PROCEDURE — 85610 PROTHROMBIN TIME: CPT

## 2024-05-28 PROCEDURE — 97110 THERAPEUTIC EXERCISES: CPT

## 2024-05-28 PROCEDURE — 83735 ASSAY OF MAGNESIUM: CPT

## 2024-05-28 PROCEDURE — 70450 CT HEAD/BRAIN W/O DYE: CPT | Mod: MC

## 2024-05-28 PROCEDURE — 97116 GAIT TRAINING THERAPY: CPT

## 2024-05-28 PROCEDURE — 82962 GLUCOSE BLOOD TEST: CPT

## 2024-05-28 PROCEDURE — 85018 HEMOGLOBIN: CPT

## 2024-05-28 PROCEDURE — 85025 COMPLETE CBC W/AUTO DIFF WBC: CPT

## 2024-05-28 PROCEDURE — 82947 ASSAY GLUCOSE BLOOD QUANT: CPT

## 2024-05-28 PROCEDURE — 85730 THROMBOPLASTIN TIME PARTIAL: CPT

## 2024-05-28 PROCEDURE — 80053 COMPREHEN METABOLIC PANEL: CPT

## 2024-05-28 PROCEDURE — 83036 HEMOGLOBIN GLYCOSYLATED A1C: CPT

## 2024-05-28 PROCEDURE — 97161 PT EVAL LOW COMPLEX 20 MIN: CPT

## 2024-05-28 PROCEDURE — 80048 BASIC METABOLIC PNL TOTAL CA: CPT

## 2024-05-28 PROCEDURE — 81001 URINALYSIS AUTO W/SCOPE: CPT

## 2024-05-28 PROCEDURE — 96374 THER/PROPH/DIAG INJ IV PUSH: CPT

## 2024-05-28 PROCEDURE — 84100 ASSAY OF PHOSPHORUS: CPT

## 2024-05-28 PROCEDURE — 84132 ASSAY OF SERUM POTASSIUM: CPT

## 2024-05-28 PROCEDURE — 85027 COMPLETE CBC AUTOMATED: CPT

## 2024-05-28 PROCEDURE — 87077 CULTURE AEROBIC IDENTIFY: CPT

## 2024-05-28 PROCEDURE — 87186 SC STD MICRODIL/AGAR DIL: CPT

## 2024-05-28 PROCEDURE — 82803 BLOOD GASES ANY COMBINATION: CPT

## 2024-05-28 PROCEDURE — 82435 ASSAY OF BLOOD CHLORIDE: CPT

## 2024-05-28 PROCEDURE — 99232 SBSQ HOSP IP/OBS MODERATE 35: CPT | Mod: GC

## 2024-05-28 PROCEDURE — 87086 URINE CULTURE/COLONY COUNT: CPT

## 2024-05-28 PROCEDURE — 87641 MR-STAPH DNA AMP PROBE: CPT

## 2024-05-28 PROCEDURE — 83605 ASSAY OF LACTIC ACID: CPT

## 2024-05-28 PROCEDURE — 85014 HEMATOCRIT: CPT

## 2024-05-28 PROCEDURE — 96375 TX/PRO/DX INJ NEW DRUG ADDON: CPT

## 2024-05-28 RX ORDER — CIPROFLOXACIN LACTATE 400MG/40ML
1 VIAL (ML) INTRAVENOUS
Qty: 6 | Refills: 0
Start: 2024-05-28 | End: 2024-05-30

## 2024-05-28 RX ADMIN — DORZOLAMIDE HYDROCHLORIDE TIMOLOL MALEATE 1 DROP(S): 20; 5 SOLUTION/ DROPS OPHTHALMIC at 05:26

## 2024-05-28 RX ADMIN — POLYETHYLENE GLYCOL 3350 17 GRAM(S): 17 POWDER, FOR SOLUTION ORAL at 14:14

## 2024-05-28 RX ADMIN — PIPERACILLIN AND TAZOBACTAM 25 GRAM(S): 4; .5 INJECTION, POWDER, LYOPHILIZED, FOR SOLUTION INTRAVENOUS at 14:14

## 2024-05-28 RX ADMIN — CARBIDOPA AND LEVODOPA 1 TABLET(S): 25; 100 TABLET ORAL at 05:26

## 2024-05-28 RX ADMIN — PIPERACILLIN AND TAZOBACTAM 25 GRAM(S): 4; .5 INJECTION, POWDER, LYOPHILIZED, FOR SOLUTION INTRAVENOUS at 05:27

## 2024-05-28 RX ADMIN — CHLORHEXIDINE GLUCONATE 1 APPLICATION(S): 213 SOLUTION TOPICAL at 14:14

## 2024-05-28 RX ADMIN — CARBIDOPA AND LEVODOPA 1 TABLET(S): 25; 100 TABLET ORAL at 14:15

## 2024-05-28 RX ADMIN — LISINOPRIL 40 MILLIGRAM(S): 2.5 TABLET ORAL at 05:26

## 2024-05-28 NOTE — PROGRESS NOTE ADULT - ASSESSMENT
84 yo male PMHx Parkinson, BPH with straight cath at home admitted with severe sepsis 2/2 UTI
82 yo male PMHx Parkinson, BPH with straight cath at home admitted with severe sepsis 2/2 UTI
84 yo male PMHx Parkinson, BPH with straight cath at home admitted with severe sepsis 2/2 UTI
82 yo male PMHx Parkinson, BPH with straight cath at home admitted with severe sepsis 2/2 UTI

## 2024-05-28 NOTE — PROGRESS NOTE ADULT - PROBLEM SELECTOR PROBLEM 2
H/O Parkinson's disease

## 2024-05-28 NOTE — PROGRESS NOTE ADULT - SUBJECTIVE AND OBJECTIVE BOX
*******************************  Rogerio Stone MD (PGY-1)  Internal Medicine  Contact via [employer] TEAMS  *******************************    PROGRESS NOTE:     Patient is a 83y old  Male who presents with a chief complaint of fall, encephalopathy (27 May 2024 06:45)      INTERVAL EVENTS: No acute overnight events.     SUBJECTIVE: Patient seen and examined at bedside. This morning, the patient is comfortable and doing well. No acute complaints. Denies fevers, chills, N/V/D, chest pain, SOB, abdominal pain.    MEDICATIONS  (STANDING):  carbidopa/levodopa  25/100 1 Tablet(s) Oral three times a day  chlorhexidine 4% Liquid 1 Application(s) Topical daily  dextrose 10% Bolus 125 milliLiter(s) IV Bolus once  dextrose 5%. 1000 milliLiter(s) (50 mL/Hr) IV Continuous <Continuous>  dextrose 5%. 1000 milliLiter(s) (100 mL/Hr) IV Continuous <Continuous>  dextrose 50% Injectable 25 Gram(s) IV Push once  dextrose 50% Injectable 12.5 Gram(s) IV Push once  dorzolamide 2%/timolol 0.5% Ophthalmic Solution 1 Drop(s) Both EYES two times a day  enoxaparin Injectable 40 milliGRAM(s) SubCutaneous every 24 hours  glucagon  Injectable 1 milliGRAM(s) IntraMuscular once  insulin lispro (ADMELOG) corrective regimen sliding scale   SubCutaneous three times a day before meals  insulin lispro (ADMELOG) corrective regimen sliding scale   SubCutaneous at bedtime  latanoprost 0.005% Ophthalmic Solution 1 Drop(s) Both EYES at bedtime  lisinopril 40 milliGRAM(s) Oral daily  piperacillin/tazobactam IVPB.. 3.375 Gram(s) IV Intermittent every 8 hours  polyethylene glycol 3350 17 Gram(s) Oral daily  simvastatin 40 milliGRAM(s) Oral at bedtime  tamsulosin 0.4 milliGRAM(s) Oral at bedtime  traZODone 50 milliGRAM(s) Oral at bedtime    MEDICATIONS  (PRN):  acetaminophen     Tablet .. 650 milliGRAM(s) Oral every 6 hours PRN Temp greater or equal to 38C (100.4F), Mild Pain (1 - 3)  aluminum hydroxide/magnesium hydroxide/simethicone Suspension 30 milliLiter(s) Oral every 4 hours PRN Dyspepsia  dextrose Oral Gel 15 Gram(s) Oral once PRN Blood Glucose LESS THAN 70 milliGRAM(s)/deciliter  melatonin 3 milliGRAM(s) Oral at bedtime PRN Insomnia  ondansetron Injectable 4 milliGRAM(s) IV Push every 8 hours PRN Nausea and/or Vomiting  simethicone 80 milliGRAM(s) Chew three times a day PRN Gas      CAPILLARY BLOOD GLUCOSE      POCT Blood Glucose.: 107 mg/dL (27 May 2024 21:24)  POCT Blood Glucose.: 135 mg/dL (27 May 2024 17:30)  POCT Blood Glucose.: 118 mg/dL (27 May 2024 12:58)  POCT Blood Glucose.: 107 mg/dL (27 May 2024 09:02)    I&O's Summary    26 May 2024 07:01  -  27 May 2024 07:00  --------------------------------------------------------  IN: 100 mL / OUT: 400 mL / NET: -300 mL    27 May 2024 07:01  -  28 May 2024 06:48  --------------------------------------------------------  IN: 300 mL / OUT: 450 mL / NET: -150 mL        PHYSICAL EXAM:  Vital Signs Last 24 Hrs  T(C): 36.6 (28 May 2024 04:10), Max: 37.1 (27 May 2024 11:47)  T(F): 97.9 (28 May 2024 04:10), Max: 98.8 (27 May 2024 11:47)  HR: 91 (28 May 2024 04:10) (86 - 91)  BP: 137/78 (28 May 2024 04:10) (126/76 - 163/81)  BP(mean): --  RR: 18 (28 May 2024 04:10) (18 - 18)  SpO2: 98% (28 May 2024 04:10) (98% - 98%)    Parameters below as of 28 May 2024 04:10  Patient On (Oxygen Delivery Method): room air        GENERAL: NAD, lying in bed comfortably  HEAD: Atraumatic, normocephalic  EYES: EOMI, PERRLA  ENT: Moist mucous membranes  NECK: Supple, no JVD  HEART: S1, S2, Regular rate and rhythm, no murmurs, rubs, or gallops  LUNGS: Unlabored respirations, clear to auscultation bilaterally, no crackles, wheezing, or rhonchi  ABDOMEN: Soft, nontender, nondistended, +BS  EXTREMITIES: No clubbing, cyanosis, or edema  NERVOUS SYSTEM:  A&Ox3, no focal deficits   SKIN: No rashes or lesions    LABS:                        12.6   7.63  )-----------( 240      ( 27 May 2024 07:17 )             36.0     05-27    137  |  103  |  24<H>  ----------------------------<  101<H>  3.9   |  20<L>  |  1.08    Ca    9.5      27 May 2024 07:17  Phos  3.6     05-27  Mg     2.1     05-27            Urinalysis Basic - ( 27 May 2024 07:17 )    Color: x / Appearance: x / SG: x / pH: x  Gluc: 101 mg/dL / Ketone: x  / Bili: x / Urobili: x   Blood: x / Protein: x / Nitrite: x   Leuk Esterase: x / RBC: x / WBC x   Sq Epi: x / Non Sq Epi: x / Bacteria: x          RADIOLOGY & ADDITIONAL TESTS:  Results Reviewed:   Imaging Personally Reviewed:  Electrocardiogram Personally Reviewed:  Tele: *******************************  Rogerio Stone MD (PGY-1)  Internal Medicine  Contact via [employer] TEAMS  *******************************    PROGRESS NOTE:     Patient is a 83y old  Male who presents with a chief complaint of fall, encephalopathy (27 May 2024 06:45)      INTERVAL EVENTS: No acute overnight events.     SUBJECTIVE: Patient seen and examined at bedside. This morning, the patient is comfortable and doing well. No acute complaints. Denies fevers, chills, N/V/D, chest pain, SOB, abdominal pain.    MEDICATIONS  (STANDING):  carbidopa/levodopa  25/100 1 Tablet(s) Oral three times a day  chlorhexidine 4% Liquid 1 Application(s) Topical daily  dextrose 10% Bolus 125 milliLiter(s) IV Bolus once  dextrose 5%. 1000 milliLiter(s) (50 mL/Hr) IV Continuous <Continuous>  dextrose 5%. 1000 milliLiter(s) (100 mL/Hr) IV Continuous <Continuous>  dextrose 50% Injectable 25 Gram(s) IV Push once  dextrose 50% Injectable 12.5 Gram(s) IV Push once  dorzolamide 2%/timolol 0.5% Ophthalmic Solution 1 Drop(s) Both EYES two times a day  enoxaparin Injectable 40 milliGRAM(s) SubCutaneous every 24 hours  glucagon  Injectable 1 milliGRAM(s) IntraMuscular once  insulin lispro (ADMELOG) corrective regimen sliding scale   SubCutaneous three times a day before meals  insulin lispro (ADMELOG) corrective regimen sliding scale   SubCutaneous at bedtime  latanoprost 0.005% Ophthalmic Solution 1 Drop(s) Both EYES at bedtime  lisinopril 40 milliGRAM(s) Oral daily  piperacillin/tazobactam IVPB.. 3.375 Gram(s) IV Intermittent every 8 hours  polyethylene glycol 3350 17 Gram(s) Oral daily  simvastatin 40 milliGRAM(s) Oral at bedtime  tamsulosin 0.4 milliGRAM(s) Oral at bedtime  traZODone 50 milliGRAM(s) Oral at bedtime    MEDICATIONS  (PRN):  acetaminophen     Tablet .. 650 milliGRAM(s) Oral every 6 hours PRN Temp greater or equal to 38C (100.4F), Mild Pain (1 - 3)  aluminum hydroxide/magnesium hydroxide/simethicone Suspension 30 milliLiter(s) Oral every 4 hours PRN Dyspepsia  dextrose Oral Gel 15 Gram(s) Oral once PRN Blood Glucose LESS THAN 70 milliGRAM(s)/deciliter  melatonin 3 milliGRAM(s) Oral at bedtime PRN Insomnia  ondansetron Injectable 4 milliGRAM(s) IV Push every 8 hours PRN Nausea and/or Vomiting  simethicone 80 milliGRAM(s) Chew three times a day PRN Gas      CAPILLARY BLOOD GLUCOSE      POCT Blood Glucose.: 107 mg/dL (27 May 2024 21:24)  POCT Blood Glucose.: 135 mg/dL (27 May 2024 17:30)  POCT Blood Glucose.: 118 mg/dL (27 May 2024 12:58)  POCT Blood Glucose.: 107 mg/dL (27 May 2024 09:02)    I&O's Summary    26 May 2024 07:01  -  27 May 2024 07:00  --------------------------------------------------------  IN: 100 mL / OUT: 400 mL / NET: -300 mL    27 May 2024 07:01  -  28 May 2024 06:48  --------------------------------------------------------  IN: 300 mL / OUT: 450 mL / NET: -150 mL        PHYSICAL EXAM:  Vital Signs Last 24 Hrs  T(C): 36.6 (28 May 2024 04:10), Max: 37.1 (27 May 2024 11:47)  T(F): 97.9 (28 May 2024 04:10), Max: 98.8 (27 May 2024 11:47)  HR: 91 (28 May 2024 04:10) (86 - 91)  BP: 137/78 (28 May 2024 04:10) (126/76 - 163/81)  BP(mean): --  RR: 18 (28 May 2024 04:10) (18 - 18)  SpO2: 98% (28 May 2024 04:10) (98% - 98%)    Parameters below as of 28 May 2024 04:10  Patient On (Oxygen Delivery Method): room air        PHYSICAL EXAM:  General: NAD, resting in bed  Lungs: clear to auscultation b/l  Heart: +s1/s2, RRR, no murmurs or gallops  Abdomen: soft, non-distended, non-tender to palpation  Extremities: no peripheral edema  Neuro: A&Ox3; tremor in b/l UE. non-focal      LABS:                        12.6   7.63  )-----------( 240      ( 27 May 2024 07:17 )             36.0     05-27    137  |  103  |  24<H>  ----------------------------<  101<H>  3.9   |  20<L>  |  1.08    Ca    9.5      27 May 2024 07:17  Phos  3.6     05-27  Mg     2.1     05-27            Urinalysis Basic - ( 27 May 2024 07:17 )    Color: x / Appearance: x / SG: x / pH: x  Gluc: 101 mg/dL / Ketone: x  / Bili: x / Urobili: x   Blood: x / Protein: x / Nitrite: x   Leuk Esterase: x / RBC: x / WBC x   Sq Epi: x / Non Sq Epi: x / Bacteria: x          RADIOLOGY & ADDITIONAL TESTS:  Results Reviewed:   Imaging Personally Reviewed:  Electrocardiogram Personally Reviewed:  Tele: *******************************  Rogerio Stone MD (PGY-1)  Internal Medicine  Contact via [employer] TEAMS  *******************************    PROGRESS NOTE:     Patient is a 83y old  Male who presents with a chief complaint of fall, encephalopathy (27 May 2024 06:45)      INTERVAL EVENTS: No acute overnight events.     SUBJECTIVE: Patient seen and examined at bedside. This morning, the patient is comfortable and doing well. No acute complaints. Denies fevers, chills, N/V/D, chest pain, SOB, abdominal pain.    MEDICATIONS  (STANDING):  carbidopa/levodopa  25/100 1 Tablet(s) Oral three times a day  chlorhexidine 4% Liquid 1 Application(s) Topical daily  dextrose 10% Bolus 125 milliLiter(s) IV Bolus once  dextrose 5%. 1000 milliLiter(s) (50 mL/Hr) IV Continuous <Continuous>  dextrose 5%. 1000 milliLiter(s) (100 mL/Hr) IV Continuous <Continuous>  dextrose 50% Injectable 25 Gram(s) IV Push once  dextrose 50% Injectable 12.5 Gram(s) IV Push once  dorzolamide 2%/timolol 0.5% Ophthalmic Solution 1 Drop(s) Both EYES two times a day  enoxaparin Injectable 40 milliGRAM(s) SubCutaneous every 24 hours  glucagon  Injectable 1 milliGRAM(s) IntraMuscular once  insulin lispro (ADMELOG) corrective regimen sliding scale   SubCutaneous three times a day before meals  insulin lispro (ADMELOG) corrective regimen sliding scale   SubCutaneous at bedtime  latanoprost 0.005% Ophthalmic Solution 1 Drop(s) Both EYES at bedtime  lisinopril 40 milliGRAM(s) Oral daily  piperacillin/tazobactam IVPB.. 3.375 Gram(s) IV Intermittent every 8 hours  polyethylene glycol 3350 17 Gram(s) Oral daily  simvastatin 40 milliGRAM(s) Oral at bedtime  tamsulosin 0.4 milliGRAM(s) Oral at bedtime  traZODone 50 milliGRAM(s) Oral at bedtime    MEDICATIONS  (PRN):  acetaminophen     Tablet .. 650 milliGRAM(s) Oral every 6 hours PRN Temp greater or equal to 38C (100.4F), Mild Pain (1 - 3)  aluminum hydroxide/magnesium hydroxide/simethicone Suspension 30 milliLiter(s) Oral every 4 hours PRN Dyspepsia  dextrose Oral Gel 15 Gram(s) Oral once PRN Blood Glucose LESS THAN 70 milliGRAM(s)/deciliter  melatonin 3 milliGRAM(s) Oral at bedtime PRN Insomnia  ondansetron Injectable 4 milliGRAM(s) IV Push every 8 hours PRN Nausea and/or Vomiting  simethicone 80 milliGRAM(s) Chew three times a day PRN Gas      CAPILLARY BLOOD GLUCOSE      POCT Blood Glucose.: 107 mg/dL (27 May 2024 21:24)  POCT Blood Glucose.: 135 mg/dL (27 May 2024 17:30)  POCT Blood Glucose.: 118 mg/dL (27 May 2024 12:58)  POCT Blood Glucose.: 107 mg/dL (27 May 2024 09:02)    I&O's Summary    26 May 2024 07:01  -  27 May 2024 07:00  --------------------------------------------------------  IN: 100 mL / OUT: 400 mL / NET: -300 mL    27 May 2024 07:01  -  28 May 2024 06:48  --------------------------------------------------------  IN: 300 mL / OUT: 450 mL / NET: -150 mL        PHYSICAL EXAM:  Vital Signs Last 24 Hrs  T(C): 36.6 (28 May 2024 04:10), Max: 37.1 (27 May 2024 11:47)  T(F): 97.9 (28 May 2024 04:10), Max: 98.8 (27 May 2024 11:47)  HR: 91 (28 May 2024 04:10) (86 - 91)  BP: 137/78 (28 May 2024 04:10) (126/76 - 163/81)  BP(mean): --  RR: 18 (28 May 2024 04:10) (18 - 18)  SpO2: 98% (28 May 2024 04:10) (98% - 98%)    Parameters below as of 28 May 2024 04:10  Patient On (Oxygen Delivery Method): room air        PHYSICAL EXAM:  General: NAD, resting in bed  Lungs: clear to auscultation b/l  Heart: +s1/s2, RRR, no murmurs or gallops  Abdomen: soft, distended, non-tender to palpation  Extremities: no peripheral edema  Neuro: A&Ox3; tremor in b/l UE. non-focal      LABS:                        12.6   7.63  )-----------( 240      ( 27 May 2024 07:17 )             36.0     05-27    137  |  103  |  24<H>  ----------------------------<  101<H>  3.9   |  20<L>  |  1.08    Ca    9.5      27 May 2024 07:17  Phos  3.6     05-27  Mg     2.1     05-27            Urinalysis Basic - ( 27 May 2024 07:17 )    Color: x / Appearance: x / SG: x / pH: x  Gluc: 101 mg/dL / Ketone: x  / Bili: x / Urobili: x   Blood: x / Protein: x / Nitrite: x   Leuk Esterase: x / RBC: x / WBC x   Sq Epi: x / Non Sq Epi: x / Bacteria: x          RADIOLOGY & ADDITIONAL TESTS:  Results Reviewed:   Imaging Personally Reviewed:  Electrocardiogram Personally Reviewed:  Tele:

## 2024-05-28 NOTE — DISCHARGE NOTE NURSING/CASE MANAGEMENT/SOCIAL WORK - NSDCFUADDAPPT_GEN_ALL_CORE_FT
APPTS ARE READY TO BE MADE: [x] YES    Best Family or Patient Contact (if needed):    Additional Information about above appointments (if needed):    1: f/u w/ pcp within 1 week     Other comments or requests:

## 2024-05-28 NOTE — PROGRESS NOTE ADULT - PROBLEM SELECTOR PLAN 1
- met severe SIRS criteria on admission - WBC, Fever, Potential source and EOD (encephalopathy)  - failed oral cephalosporin, will continue with zoysn  - follow up BCx, UCx - pseudomonas, pan-sensitive; will transition to po cipro on dc   - once mental status improves, discuss with patient if he is still able to straight cath (difficulties per family from Parkinson) vs. schumacher vs. suprapubic catheter - met severe SIRS criteria on admission - WBC, Fever, Potential source and EOD (encephalopathy)  - failed oral cephalosporin, will continue with zoysn  - follow up BCx, UCx - pseudomonas, pan-sensitive; will transition to po cipro on dc for 7x dday total course - met severe SIRS criteria on admission - WBC, Fever, Potential source and EOD (encephalopathy)  - failed oral cephalosporin, dc zosyn -> cipro  - follow up BCx, UCx - pseudomonas, pan-sensitive; will transition to po cipro on dc for 7x day total course

## 2024-05-28 NOTE — PROGRESS NOTE ADULT - REASON FOR ADMISSION
fall, encephalopathy

## 2024-05-28 NOTE — PROGRESS NOTE ADULT - ATTENDING COMMENTS
84 yo male PMHx Parkinson, BPH with straight cath at home admitted with severe sepsis 2/2 UTI    # Sepsis/UTI: Cont with empiric abx- Zosyn (5/22 - ). Can transition to oral now.   UCx with Pseudomonas.    BCx NGTD    # Urinary retention: Patient self-catheterizes at home. He is voiding now. PVR checked on 5/26 - 24 cc. Cont with Flomax.     # HTN: c/w Lisinopril. Monitor BP      # Parkinson : Cont with Sinemet     #Constipation: Lactulose 5/27. Patient reports last BM one week ago.     PT Justus. Pending placement.  Rest of care as above   Discussed with resident.

## 2025-01-14 NOTE — DISCHARGE NOTE NURSING/CASE MANAGEMENT/SOCIAL WORK - PATIENT PORTAL LINK FT
No You can access the FollowMyHealth Patient Portal offered by [facility] by registering at the following website: [email or web address]. By joining [facility]’s FollowMyHealth portal, you will also be able to view your health information using other applications (apps) compatible with our system.

## 2025-03-19 ENCOUNTER — INPATIENT (INPATIENT)
Facility: HOSPITAL | Age: 85
LOS: 4 days | Discharge: HOME HEALTH | DRG: 206 | End: 2025-03-24
Attending: INTERNAL MEDICINE | Admitting: INTERNAL MEDICINE
Payer: MEDICARE

## 2025-03-19 VITALS
SYSTOLIC BLOOD PRESSURE: 118 MMHG | OXYGEN SATURATION: 99 % | DIASTOLIC BLOOD PRESSURE: 63 MMHG | HEART RATE: 105 BPM | TEMPERATURE: 98 F | RESPIRATION RATE: 20 BRPM

## 2025-03-19 DIAGNOSIS — Z96.21 COCHLEAR IMPLANT STATUS: Chronic | ICD-10-CM

## 2025-03-19 DIAGNOSIS — R09.02 HYPOXEMIA: ICD-10-CM

## 2025-03-19 PROBLEM — E11.9 TYPE 2 DIABETES MELLITUS WITHOUT COMPLICATIONS: Chronic | Status: ACTIVE | Noted: 2024-05-22

## 2025-03-19 PROBLEM — I10 ESSENTIAL (PRIMARY) HYPERTENSION: Chronic | Status: ACTIVE | Noted: 2024-05-22

## 2025-03-19 LAB
ALBUMIN SERPL ELPH-MCNC: 3.9 G/DL — SIGNIFICANT CHANGE UP (ref 3.3–5)
ALP SERPL-CCNC: 97 U/L — SIGNIFICANT CHANGE UP (ref 40–120)
ALT FLD-CCNC: 9 U/L — LOW (ref 10–45)
AMORPH SED URNS QL MICRO: PRESENT
ANION GAP SERPL CALC-SCNC: 17 MMOL/L — SIGNIFICANT CHANGE UP (ref 5–17)
APPEARANCE UR: ABNORMAL
APTT BLD: 28.8 SEC — SIGNIFICANT CHANGE UP (ref 24.5–35.6)
AST SERPL-CCNC: 46 U/L — HIGH (ref 10–40)
BACTERIA # UR AUTO: NEGATIVE /HPF — SIGNIFICANT CHANGE UP
BASOPHILS # BLD AUTO: 0.01 K/UL — SIGNIFICANT CHANGE UP (ref 0–0.2)
BASOPHILS NFR BLD AUTO: 0.2 % — SIGNIFICANT CHANGE UP (ref 0–2)
BILIRUB SERPL-MCNC: 0.4 MG/DL — SIGNIFICANT CHANGE UP (ref 0.2–1.2)
BILIRUB UR-MCNC: NEGATIVE — SIGNIFICANT CHANGE UP
BUN SERPL-MCNC: 28 MG/DL — HIGH (ref 7–23)
CALCIUM SERPL-MCNC: 9.6 MG/DL — SIGNIFICANT CHANGE UP (ref 8.4–10.5)
CAST: 11 /LPF — HIGH (ref 0–4)
CHLORIDE SERPL-SCNC: 101 MMOL/L — SIGNIFICANT CHANGE UP (ref 96–108)
CO2 SERPL-SCNC: 19 MMOL/L — LOW (ref 22–31)
COD CRY URNS QL: PRESENT
COLOR SPEC: SIGNIFICANT CHANGE UP
CREAT SERPL-MCNC: 1.28 MG/DL — SIGNIFICANT CHANGE UP (ref 0.5–1.3)
DIFF PNL FLD: ABNORMAL
EGFR: 55 ML/MIN/1.73M2 — LOW
EGFR: 55 ML/MIN/1.73M2 — LOW
EOSINOPHIL # BLD AUTO: 0 K/UL — SIGNIFICANT CHANGE UP (ref 0–0.5)
EOSINOPHIL NFR BLD AUTO: 0 % — SIGNIFICANT CHANGE UP (ref 0–6)
FLUAV AG NPH QL: DETECTED
FLUBV AG NPH QL: SIGNIFICANT CHANGE UP
GAS PNL BLDV: SIGNIFICANT CHANGE UP
GLUCOSE BLDC GLUCOMTR-MCNC: 104 MG/DL — HIGH (ref 70–99)
GLUCOSE SERPL-MCNC: 102 MG/DL — HIGH (ref 70–99)
GLUCOSE UR QL: NEGATIVE MG/DL — SIGNIFICANT CHANGE UP
HCT VFR BLD CALC: 35.3 % — LOW (ref 39–50)
HGB BLD-MCNC: 12 G/DL — LOW (ref 13–17)
HYALINE CASTS # UR AUTO: PRESENT
IMM GRANULOCYTES NFR BLD AUTO: 0.2 % — SIGNIFICANT CHANGE UP (ref 0–0.9)
INR BLD: 1.05 RATIO — SIGNIFICANT CHANGE UP (ref 0.85–1.16)
KETONES UR-MCNC: ABNORMAL MG/DL
LEUKOCYTE ESTERASE UR-ACNC: ABNORMAL
LYMPHOCYTES # BLD AUTO: 0.97 K/UL — LOW (ref 1–3.3)
LYMPHOCYTES # BLD AUTO: 16.6 % — SIGNIFICANT CHANGE UP (ref 13–44)
MAGNESIUM SERPL-MCNC: 2 MG/DL — SIGNIFICANT CHANGE UP (ref 1.6–2.6)
MCHC RBC-ENTMCNC: 30.6 PG — SIGNIFICANT CHANGE UP (ref 27–34)
MCHC RBC-ENTMCNC: 34 G/DL — SIGNIFICANT CHANGE UP (ref 32–36)
MCV RBC AUTO: 90.1 FL — SIGNIFICANT CHANGE UP (ref 80–100)
MONOCYTES # BLD AUTO: 0.71 K/UL — SIGNIFICANT CHANGE UP (ref 0–0.9)
MONOCYTES NFR BLD AUTO: 12.1 % — SIGNIFICANT CHANGE UP (ref 2–14)
NEUTROPHILS # BLD AUTO: 4.15 K/UL — SIGNIFICANT CHANGE UP (ref 1.8–7.4)
NEUTROPHILS NFR BLD AUTO: 70.9 % — SIGNIFICANT CHANGE UP (ref 43–77)
NITRITE UR-MCNC: POSITIVE
NRBC BLD AUTO-RTO: 0 /100 WBCS — SIGNIFICANT CHANGE UP (ref 0–0)
PH UR: 6 — SIGNIFICANT CHANGE UP (ref 5–8)
PLATELET # BLD AUTO: 163 K/UL — SIGNIFICANT CHANGE UP (ref 150–400)
POTASSIUM SERPL-MCNC: 3.7 MMOL/L — SIGNIFICANT CHANGE UP (ref 3.5–5.3)
POTASSIUM SERPL-SCNC: 3.7 MMOL/L — SIGNIFICANT CHANGE UP (ref 3.5–5.3)
PROT SERPL-MCNC: 7.6 G/DL — SIGNIFICANT CHANGE UP (ref 6–8.3)
PROT UR-MCNC: 100 MG/DL
PROTHROM AB SERPL-ACNC: 12.1 SEC — SIGNIFICANT CHANGE UP (ref 9.9–13.4)
RBC # BLD: 3.92 M/UL — LOW (ref 4.2–5.8)
RBC # FLD: 13.8 % — SIGNIFICANT CHANGE UP (ref 10.3–14.5)
RBC CASTS # UR COMP ASSIST: 2 /HPF — SIGNIFICANT CHANGE UP (ref 0–4)
REVIEW: SIGNIFICANT CHANGE UP
RSV RNA NPH QL NAA+NON-PROBE: SIGNIFICANT CHANGE UP
SARS-COV-2 RNA SPEC QL NAA+PROBE: SIGNIFICANT CHANGE UP
SODIUM SERPL-SCNC: 137 MMOL/L — SIGNIFICANT CHANGE UP (ref 135–145)
SOURCE RESPIRATORY: SIGNIFICANT CHANGE UP
SP GR SPEC: 1.03 — SIGNIFICANT CHANGE UP (ref 1–1.03)
SQUAMOUS # UR AUTO: 2 /HPF — SIGNIFICANT CHANGE UP (ref 0–5)
TROPONIN T, HIGH SENSITIVITY RESULT: 24 NG/L — SIGNIFICANT CHANGE UP (ref 0–51)
TROPONIN T, HIGH SENSITIVITY RESULT: 27 NG/L — SIGNIFICANT CHANGE UP (ref 0–51)
UROBILINOGEN FLD QL: 1 MG/DL — SIGNIFICANT CHANGE UP (ref 0.2–1)
WBC # BLD: 5.85 K/UL — SIGNIFICANT CHANGE UP (ref 3.8–10.5)
WBC # FLD AUTO: 5.85 K/UL — SIGNIFICANT CHANGE UP (ref 3.8–10.5)
WBC UR QL: 164 /HPF — HIGH (ref 0–5)

## 2025-03-19 PROCEDURE — 72170 X-RAY EXAM OF PELVIS: CPT | Mod: 26

## 2025-03-19 PROCEDURE — 93010 ELECTROCARDIOGRAM REPORT: CPT

## 2025-03-19 PROCEDURE — 99285 EMERGENCY DEPT VISIT HI MDM: CPT | Mod: FS

## 2025-03-19 PROCEDURE — 72125 CT NECK SPINE W/O DYE: CPT | Mod: 26

## 2025-03-19 PROCEDURE — 70450 CT HEAD/BRAIN W/O DYE: CPT | Mod: 26

## 2025-03-19 PROCEDURE — 71045 X-RAY EXAM CHEST 1 VIEW: CPT | Mod: 26

## 2025-03-19 RX ORDER — AZITHROMYCIN 250 MG
500 CAPSULE ORAL ONCE
Refills: 0 | Status: COMPLETED | OUTPATIENT
Start: 2025-03-19 | End: 2025-03-19

## 2025-03-19 RX ORDER — INSULIN LISPRO 100 U/ML
INJECTION, SOLUTION INTRAVENOUS; SUBCUTANEOUS
Refills: 0 | Status: DISCONTINUED | OUTPATIENT
Start: 2025-03-19 | End: 2025-03-24

## 2025-03-19 RX ORDER — CEFTRIAXONE 500 MG/1
1000 INJECTION, POWDER, FOR SOLUTION INTRAMUSCULAR; INTRAVENOUS EVERY 24 HOURS
Refills: 0 | Status: COMPLETED | OUTPATIENT
Start: 2025-03-19 | End: 2025-03-23

## 2025-03-19 RX ORDER — DEXTROSE 50 % IN WATER 50 %
25 SYRINGE (ML) INTRAVENOUS ONCE
Refills: 0 | Status: DISCONTINUED | OUTPATIENT
Start: 2025-03-19 | End: 2025-03-24

## 2025-03-19 RX ORDER — ATORVASTATIN CALCIUM 80 MG/1
20 TABLET, FILM COATED ORAL AT BEDTIME
Refills: 0 | Status: DISCONTINUED | OUTPATIENT
Start: 2025-03-19 | End: 2025-03-24

## 2025-03-19 RX ORDER — GLUCAGON 3 MG/1
1 POWDER NASAL ONCE
Refills: 0 | Status: DISCONTINUED | OUTPATIENT
Start: 2025-03-19 | End: 2025-03-24

## 2025-03-19 RX ORDER — DEXTROSE 50 % IN WATER 50 %
12.5 SYRINGE (ML) INTRAVENOUS ONCE
Refills: 0 | Status: DISCONTINUED | OUTPATIENT
Start: 2025-03-19 | End: 2025-03-24

## 2025-03-19 RX ORDER — LATANOPROST PF 0.05 MG/ML
1 SOLUTION/ DROPS OPHTHALMIC AT BEDTIME
Refills: 0 | Status: DISCONTINUED | OUTPATIENT
Start: 2025-03-19 | End: 2025-03-24

## 2025-03-19 RX ORDER — HYPROMELLOSE 0.4 %
1 DROPS OPHTHALMIC (EYE)
Refills: 0 | DISCHARGE

## 2025-03-19 RX ORDER — BRIMONIDINE TARTRATE 1.5 MG/ML
1 SOLUTION/ DROPS OPHTHALMIC
Refills: 0 | DISCHARGE

## 2025-03-19 RX ORDER — ESCITALOPRAM OXALATE 20 MG/1
1 TABLET ORAL
Refills: 0 | DISCHARGE

## 2025-03-19 RX ORDER — OSELTAMIVIR PHOSPHATE 75 MG/1
30 CAPSULE ORAL
Refills: 0 | Status: COMPLETED | OUTPATIENT
Start: 2025-03-19 | End: 2025-03-24

## 2025-03-19 RX ORDER — IPRATROPIUM BROMIDE AND ALBUTEROL SULFATE .5; 2.5 MG/3ML; MG/3ML
3 SOLUTION RESPIRATORY (INHALATION) EVERY 6 HOURS
Refills: 0 | Status: DISCONTINUED | OUTPATIENT
Start: 2025-03-19 | End: 2025-03-24

## 2025-03-19 RX ORDER — GABAPENTIN 400 MG/1
1 CAPSULE ORAL
Refills: 0 | DISCHARGE

## 2025-03-19 RX ORDER — CARBIDOPA/LEVODOPA 25MG-100MG
1.5 TABLET ORAL
Refills: 0 | DISCHARGE

## 2025-03-19 RX ORDER — LISINOPRIL 5 MG/1
40 TABLET ORAL DAILY
Refills: 0 | Status: DISCONTINUED | OUTPATIENT
Start: 2025-03-19 | End: 2025-03-24

## 2025-03-19 RX ORDER — CARBIDOPA/LEVODOPA 25MG-100MG
1 TABLET ORAL
Refills: 0 | Status: DISCONTINUED | OUTPATIENT
Start: 2025-03-19 | End: 2025-03-24

## 2025-03-19 RX ORDER — GABAPENTIN 400 MG/1
100 CAPSULE ORAL THREE TIMES A DAY
Refills: 0 | Status: DISCONTINUED | OUTPATIENT
Start: 2025-03-19 | End: 2025-03-24

## 2025-03-19 RX ORDER — ENOXAPARIN SODIUM 100 MG/ML
40 INJECTION SUBCUTANEOUS EVERY 24 HOURS
Refills: 0 | Status: DISCONTINUED | OUTPATIENT
Start: 2025-03-19 | End: 2025-03-24

## 2025-03-19 RX ORDER — SODIUM CHLORIDE 9 G/1000ML
1000 INJECTION, SOLUTION INTRAVENOUS
Refills: 0 | Status: DISCONTINUED | OUTPATIENT
Start: 2025-03-19 | End: 2025-03-24

## 2025-03-19 RX ORDER — TAMSULOSIN HYDROCHLORIDE 0.4 MG/1
0.4 CAPSULE ORAL AT BEDTIME
Refills: 0 | Status: DISCONTINUED | OUTPATIENT
Start: 2025-03-19 | End: 2025-03-24

## 2025-03-19 RX ORDER — TRAZODONE HCL 100 MG
1 TABLET ORAL
Refills: 0 | DISCHARGE

## 2025-03-19 RX ORDER — AZITHROMYCIN 250 MG
500 CAPSULE ORAL EVERY 24 HOURS
Refills: 0 | Status: DISCONTINUED | OUTPATIENT
Start: 2025-03-20 | End: 2025-03-22

## 2025-03-19 RX ORDER — ACETAMINOPHEN 500 MG/5ML
325 LIQUID (ML) ORAL ONCE
Refills: 0 | Status: COMPLETED | OUTPATIENT
Start: 2025-03-19 | End: 2025-03-19

## 2025-03-19 RX ORDER — DEXTROSE 50 % IN WATER 50 %
15 SYRINGE (ML) INTRAVENOUS ONCE
Refills: 0 | Status: DISCONTINUED | OUTPATIENT
Start: 2025-03-19 | End: 2025-03-24

## 2025-03-19 RX ORDER — TRAZODONE HCL 100 MG
150 TABLET ORAL DAILY
Refills: 0 | Status: DISCONTINUED | OUTPATIENT
Start: 2025-03-19 | End: 2025-03-24

## 2025-03-19 RX ORDER — ACETAMINOPHEN 500 MG/5ML
1000 LIQUID (ML) ORAL ONCE
Refills: 0 | Status: DISCONTINUED | OUTPATIENT
Start: 2025-03-19 | End: 2025-03-24

## 2025-03-19 RX ORDER — BRIMONIDINE TARTRATE 1.5 MG/ML
1 SOLUTION/ DROPS OPHTHALMIC
Refills: 0 | Status: DISCONTINUED | OUTPATIENT
Start: 2025-03-19 | End: 2025-03-24

## 2025-03-19 RX ORDER — OSELTAMIVIR PHOSPHATE 75 MG/1
75 CAPSULE ORAL ONCE
Refills: 0 | Status: COMPLETED | OUTPATIENT
Start: 2025-03-19 | End: 2025-03-19

## 2025-03-19 RX ORDER — DORZOLAMIDE HYDROCHLORIDE AND TIMOLOL MALEATE 20; 5 MG/ML; MG/ML
1 SOLUTION/ DROPS OPHTHALMIC
Refills: 0 | Status: DISCONTINUED | OUTPATIENT
Start: 2025-03-19 | End: 2025-03-24

## 2025-03-19 RX ORDER — ESCITALOPRAM OXALATE 20 MG/1
5 TABLET ORAL DAILY
Refills: 0 | Status: DISCONTINUED | OUTPATIENT
Start: 2025-03-19 | End: 2025-03-24

## 2025-03-19 RX ORDER — CEFTRIAXONE 500 MG/1
1000 INJECTION, POWDER, FOR SOLUTION INTRAMUSCULAR; INTRAVENOUS ONCE
Refills: 0 | Status: COMPLETED | OUTPATIENT
Start: 2025-03-19 | End: 2025-03-19

## 2025-03-19 RX ORDER — HYPROMELLOSE 0.4 %
1 DROPS OPHTHALMIC (EYE) THREE TIMES A DAY
Refills: 0 | Status: DISCONTINUED | OUTPATIENT
Start: 2025-03-19 | End: 2025-03-24

## 2025-03-19 RX ADMIN — Medication 1 DROP(S): at 21:04

## 2025-03-19 RX ADMIN — TAMSULOSIN HYDROCHLORIDE 0.4 MILLIGRAM(S): 0.4 CAPSULE ORAL at 21:04

## 2025-03-19 RX ADMIN — Medication 1000 MILLILITER(S): at 13:26

## 2025-03-19 RX ADMIN — Medication 250 MILLIGRAM(S): at 15:52

## 2025-03-19 RX ADMIN — CEFTRIAXONE 100 MILLIGRAM(S): 500 INJECTION, POWDER, FOR SOLUTION INTRAMUSCULAR; INTRAVENOUS at 15:21

## 2025-03-19 RX ADMIN — Medication 1 TABLET(S): at 23:34

## 2025-03-19 RX ADMIN — ATORVASTATIN CALCIUM 20 MILLIGRAM(S): 80 TABLET, FILM COATED ORAL at 21:03

## 2025-03-19 RX ADMIN — Medication 325 MILLIGRAM(S): at 12:50

## 2025-03-19 RX ADMIN — GABAPENTIN 100 MILLIGRAM(S): 400 CAPSULE ORAL at 21:03

## 2025-03-19 RX ADMIN — IPRATROPIUM BROMIDE AND ALBUTEROL SULFATE 3 MILLILITER(S): .5; 2.5 SOLUTION RESPIRATORY (INHALATION) at 23:34

## 2025-03-19 RX ADMIN — OSELTAMIVIR PHOSPHATE 75 MILLIGRAM(S): 75 CAPSULE ORAL at 14:30

## 2025-03-19 RX ADMIN — LATANOPROST PF 1 DROP(S): 0.05 SOLUTION/ DROPS OPHTHALMIC at 23:34

## 2025-03-19 RX ADMIN — Medication 1 TABLET(S): at 17:58

## 2025-03-19 RX ADMIN — BRIMONIDINE TARTRATE 1 DROP(S): 1.5 SOLUTION/ DROPS OPHTHALMIC at 18:34

## 2025-03-19 RX ADMIN — DORZOLAMIDE HYDROCHLORIDE AND TIMOLOL MALEATE 1 DROP(S): 20; 5 SOLUTION/ DROPS OPHTHALMIC at 17:59

## 2025-03-19 NOTE — PATIENT PROFILE ADULT - NSPROPOAURINARYCATHETER_GEN_A_NUR
Auth: NPR  Date: 8/17/18  CPT: 84147, [id number], [id number]  DX: M47.812, M50.30, M99.81   Outpatient  Procedure: MBB  SX Location:  C3, C4, C5, C6   Insurance: [employer]  Physician: Coreen De Luna
no

## 2025-03-19 NOTE — CONSULT NOTE ADULT - ASSESSMENT
Flu A / PNA  recommend broad spectrum abx  consider CT of chest   consider ID/Pulm eval     HTN  resume outpt meds  monitor for O.H. given Parkinson   high risk of autonomic dysfunction     HLD  on statin     DM II  Monitor finger stick. Insulin coverage. Diabetic education and Diabetic diet. Consider nutrition consultation.     Advanced care planning was discussed with patient and family.  Risks, benefits and alternatives of the cardiac treatments and medical therapy including procedures were discussed in detail and all questions were answered. Importance of compliance with medical therapy and lifestyle modification to improve cardiovascular health were addressed. Appropriate forms and patient educational materials were reviewed. 30 minutes face to face spent.  discussed with son

## 2025-03-19 NOTE — H&P ADULT - NSHPPHYSICALEXAM_GEN_ALL_CORE
General: FRAIL  PERRLA  Neurology: A&Ox0  Respiratory:  B/L WHEEZING +  CV: s1s2+  Abdominal: Soft, NT, ND +BS, Last BM  Extremities: No edema, + peripheral pulses  Skin Normal

## 2025-03-19 NOTE — ED ADULT NURSE NOTE - OBJECTIVE STATEMENT
85 y/o M with PMH of parkinson's, BPH, HTN, HLD, DM presents to ED complaining of flu like symptoms. Pts family member reports pt has developed fevers, chills and a cough on Saturday. Pt also had been experiencing generalized malaise and weakness. Pts family reports the pt slipped out of bed yesterday. Pt was able to ambulate since the fall but requiring assistance. Upon arrival, pt is A&OX4, satting well on RA, placed on 2 L NC for comfort. Pt appears ill. Febrile rectally. Full strength and sensation in all extremities. Pt complaining of slight right thigh pain. Ambulates with a cane at baseline. Denies headache, dizziness, vision changes, chest pain, shortness of breath, abdominal pain, nausea, vomiting, diarrhea, dysuria, hematuria, recent illness travel

## 2025-03-19 NOTE — ED ADULT NURSE NOTE - HOW OFTEN DO YOU HAVE A DRINK CONTAINING ALCOHOL?
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  She processed her emotions about difficult interactions she has with the parents which leads to Jannette feeling that nothing she does is ever good enough and guilt  She was challenged to identify the reason she chose to begin leading girl [employer] several years ago and if that is still being met  Jannette was challenged throughout session as there was a common theme of Jannette meeting the needs of others serving a behavioral purpose of validation  Jannette does verbalize awareness and insight into this and expresses anger of it never being reciprocated ex: only 2 people signed up to help with food after her surgery  Support was provided and we reviewed limit setting and boundaries to improve her emotional wellness  Manav Sepulveda feels past childhood trauma needs to be addressed to help her heal    She says that there was an incident in her childhood she tried to address with her previous therapist through EMDR but felt invalidated; after a recent discussion with her psychiatric provider where she felt validated after sharing her experience she would like to address this issue again  She did not elaborate in session today  Progress in therapy was reviewed; we discussed ongoing needs; and revisions were made to her treatment plan as appropriate  Jannette will begin EMDR with [facility] 4-5 sessions beginning in 2 weeks     Next psychotherapy appt in 1 week  We also discussed the depression support group on site  PHQ-2/9 Depression Screening    Little interest or pleasure in doing things: 3 - nearly every day  Feeling down, depressed, or hopeless: 3 - nearly every day  Trouble falling or staying asleep, or sleeping too much: 3 - nearly every day  Feeling tired or having little energy: 3 - nearly every day  Poor appetite or overeating: 3 - nearly every day  Feeling bad about yourself - or that you are a failure or have let yourself or your family down: 3 - nearly every day  Trouble concentrating on things, such as reading the newspaper or watching television: 3 - nearly every day  Moving or speaking so slowly that other people could have noticed  Or the opposite - being so fidgety or restless that you have been moving around a lot more than usual: 0 - not at all  Thoughts that you would be better off dead, or of hurting yourself in some way: 0 - not at all  PHQ-9 Score: 21   PHQ-9 Interpretation: Severe depression        MILADYS-7 Flowsheet Screening      Most Recent Value   Over the last 2 weeks, how often have you been bothered by any of the following problems?     Feeling nervous, anxious, or on edge 3   Not being able to stop or control worrying 3   Worrying too much about different things 3   Trouble relaxing 3   Being so restless that it is hard to sit still 1   Becoming easily annoyed or irritable 2   Feeling afraid as if something awful might happen 1   MILADYS-7 Total Score 16        Mental status:  Appearance calm and cooperative , adequate hygiene and grooming and good eye contact    Mood depressed and anxious   Affect tearful   Speech a normal rate and volume   Thought Processes coherent/organized and normal thought processes   Hallucinations no hallucinations present    Thought Content no delusions, negative self talk   Abnormal Thoughts no suicidal thoughts  and no homicidal thoughts    Orientation  oriented to person and place and time Remote Memory short term memory intact and long term memory intact   Attention Span concentration intact   Intellect Appears to be of Average Intelligence   Fund of Knowledge displays adequate knowledge of current events   Insight fair   Judgement fair   Muscle Strength Muscle strength and tone were normal and Normal gait    Language no difficulty naming common objects   Pain mild   Pain Scale 2         Behavioral Health Treatment Plan  Luke: Diagnosis and Treatment Plan explained to Moses Amaral relates understanding diagnosis and is agreeable to Treatment Plan   Yes Never

## 2025-03-19 NOTE — CONSULT NOTE ADULT - ASSESSMENT
[facility]  Chart Reviewed-Full Consult to follow for any immediate concerns please feel free to contact us on TEAMS (preferred) or at [phone] and have us paged  Leona Santana MD

## 2025-03-19 NOTE — PATIENT PROFILE ADULT - FALL HARM RISK - HARM RISK INTERVENTIONS

## 2025-03-19 NOTE — ED PROVIDER NOTE - OBJECTIVE STATEMENT
85 yo M with a PMH of Parkinson's disease, BPH , HTN, HLD, DM, UTIs, from home p/w lethargy. Since Saturday pt has been experiencing low grade fevers and non prod cough. Today tmax  of 100.4, was given Tylenol 650mg at 1020AM. Family states one of pts son's was sick with URI last week and was around pt at that time. Pt has been generally weak, usually ambulates with cane but has been requiring more assistance over the last few days. He reported to family that he fell last night, slid off the bed but denies head trauma. Was able to bear weight after fall and ambulate today however again with assistance. Denies nausea, vomiting, chest pain, SOB, abd pain, diarrhea, urinary complaints, headache, dizziness. Of note, family called pts PMD and informed them of sxs. Pt was prescribed Bactrim, received one dose this AM.

## 2025-03-19 NOTE — ED PROVIDER NOTE - PHYSICAL EXAMINATION
CONSTITUTIONAL: Weak appearing.  ENT: Airway patent, moist mucous membranes.   EYES: Pupils equal, round and reactive to light. EOMI. Conjunctiva normal appearing.   CARDIAC: Normal rate, regular rhythm.  Heart sounds S1, S2.    RESPIRATORY: Crackles to right lung base. O2 sat 93% on RA, improved to 97% on 2L NC. No resp distress.   GASTROINTESTINAL: Abdomen soft, non-tender, not distended.  MUSCULOSKELETAL: No midline TTP. No hip TTP bilaterally. Able to range all major joints.   NEUROLOGICAL: Alert and oriented x3, no focal deficits. +Resting tremor. Follows commands.

## 2025-03-19 NOTE — H&P ADULT - ASSESSMENT
85 yo M with a PMH of Parkinson's disease, BPH , HTN, HLD, DM, UTIs, from home p/w lethargy. Since Saturday pt has been experiencing low grade fevers and non prod cough. Today tmax  of 100.4, was given Tylenol 650mg at 1020AM. Family states one of pts son's was sick with URI last week and was around pt at that time. Pt has been generally weak, usually ambulates with cane but has been requiring more assistance over the last few days. He reported to family that he fell last night, slid off the bed but denies head trauma. Was able to bear weight after fall and ambulate today however again with assistance. Denies nausea, vomiting, chest pain, SOB, abd pain, diarrhea, urinary complaints, headache, dizziness. Of note, family called pts PMD and informed them of sxs    Acute hypoxic respiratory failure sec to influenza A with superimposed pneumonia  - cw oxygen support  - will start zosyn  - fu cultures    85 yo M with a PMH of Parkinson's disease, BPH , HTN, HLD, DM, UTIs, from home p/w lethargy. Since Saturday pt has been experiencing low grade fevers and non prod cough. Today tmax  of 100.4, was given Tylenol 650mg at 1020AM. Family states one of pts son's was sick with URI last week and was around pt at that time. Pt has been generally weak, usually ambulates with cane but has been requiring more assistance over the last few days. He reported to family that he fell last night, slid off the bed but denies head trauma. Was able to bear weight after fall and ambulate today however again with assistance. Denies nausea, vomiting, chest pain, SOB, abd pain, diarrhea, urinary complaints, headache, dizziness. Of note, family called pts PMD and informed them of sxs    Acute hypoxic respiratory failure sec to influenza A with superimposed pneumonia  - cw oxygen support  - fu cultures  - cw rocephin and azithromycin  - cw tamiflu  - will monitor     UTI  - cw rocephin  - fu cultures    Parkinson's disease  - cw sinemet  - neuro fu as outpt     DM  - monitor FS  - ISS    DVT prophylaxis

## 2025-03-19 NOTE — PATIENT PROFILE ADULT - FUNCTIONAL ASSESSMENT - DAILY ACTIVITY 2.
Pt sent to ER for testing for COVID from dialysis.  Pt went to dialysis today and told she needs to come to the ER to rule out COVID and come back for dialysis appointment at 1600 today or be admitted and dialyzed in hospital.  Pt reports SOB but states she has been SOB for the past 2 months.  Pt began dialysis treatments two months ago.  Pt reports having a \"smoker's cough\" for years.  Reports chronic joint pain from lupus.  Pt states she does not want any medical treatment in ER and only wants a COVID test.  Pt temperature 102.4 in ER.    3 = A little assistance

## 2025-03-19 NOTE — H&P ADULT - HISTORY OF PRESENT ILLNESS
83 yo M with a PMH of Parkinson's disease, BPH , HTN, HLD, DM, UTIs, from home p/w lethargy. Since Saturday pt has been experiencing low grade fevers and non prod cough. Today tmax  of 100.4, was given Tylenol 650mg at 1020AM. Family states one of pts son's was sick with URI last week and was around pt at that time. Pt has been generally weak, usually ambulates with cane but has been requiring more assistance over the last few days. He reported to family that he fell last night, slid off the bed but denies head trauma. Was able to bear weight after fall and ambulate today however again with assistance. Denies nausea, vomiting, chest pain, SOB, abd pain, diarrhea, urinary complaints, headache, dizziness. Of note, family called pts PMD and informed them of sxs

## 2025-03-19 NOTE — ED ADULT NURSE REASSESSMENT NOTE - NS ED NURSE REASSESS COMMENT FT1
Report received from LUIS MIGUEL Lee. Pt alert & oriented x 3. Breathing spontaneous and nonlabored on 2L NC.  on cardiac monitor NSR. Pt resting comfortably in bed, awaiting bed assignment w family at bedside. Pt denies sob.. IV site patent, flushing without difficulty. Pt made aware of plan of care.

## 2025-03-19 NOTE — H&P ADULT - NSHPLABSRESULTS_GEN_ALL_CORE
Lab Results:  CBC  CBC Full  -  ( 19 Mar 2025 12:40 )  WBC Count : 5.85 K/uL  RBC Count : 3.92 M/uL  Hemoglobin : 12.0 g/dL  Hematocrit : 35.3 %  Platelet Count - Automated : 163 K/uL  Mean Cell Volume : 90.1 fl  Mean Cell Hemoglobin : 30.6 pg  Mean Cell Hemoglobin Concentration : 34.0 g/dL  Auto Neutrophil # : x  Auto Lymphocyte # : x  Auto Monocyte # : x  Auto Eosinophil # : x  Auto Basophil # : x  Auto Neutrophil % : x  Auto Lymphocyte % : x  Auto Monocyte % : x  Auto Eosinophil % : x  Auto Basophil % : x    .		Differential:	[] Automated		[] Manual  Chemistry                        12.0   5.85  )-----------( 163      ( 19 Mar 2025 12:40 )             35.3     03-    137  |  101  |  28[H]  ----------------------------<  102[H]  3.7   |  19[L]  |  1.28    Ca    9.6      19 Mar 2025 12:40  Mg     2.0     -    TPro  7.6  /  Alb  3.9  /  TBili  0.4  /  DBili  x   /  AST  46[H]  /  ALT  9[L]  /  AlkPhos  97  03-19    LIVER FUNCTIONS - ( 19 Mar 2025 12:40 )  Alb: 3.9 g/dL / Pro: 7.6 g/dL / ALK PHOS: 97 U/L / ALT: 9 U/L / AST: 46 U/L / GGT: x           PT/INR - ( 19 Mar 2025 12:40 )   PT: 12.1 sec;   INR: 1.05 ratio         PTT - ( 19 Mar 2025 12:40 )  PTT:28.8 sec  Urinalysis Basic - ( 19 Mar 2025 16:14 )    Color: Dark Yellow / Appearance: Cloudy / S.026 / pH: x  Gluc: x / Ketone: Trace mg/dL  / Bili: Negative / Urobili: 1.0 mg/dL   Blood: x / Protein: 100 mg/dL / Nitrite: Positive   Leuk Esterase: Moderate / RBC: x / WBC x   Sq Epi: x / Non Sq Epi: x / Bacteria: x            MICROBIOLOGY/CULTURES:      RADIOLOGY RESULTS: reviewed

## 2025-03-19 NOTE — ED PROVIDER NOTE - ATTENDING APP SHARED VISIT CONTRIBUTION OF CARE
84 M hx of parkinsons disease priro psuedomonas UTI here w/ aide and son at bedside, presents to the ER w/ fall that happened overnight slide off the bed and hit buttocks on the ground, was unwitnessed on exam, pt is awake and alert oriented, has crackles on the R side, soft abdomen, no c/t/l spine tenderness, plan for labs and reassessment findings c/f possible pna, vs covid flu likley tba given new oxygen requirement sat of 89-90 on RA, requiring 2 L of NC,

## 2025-03-20 LAB
A1C WITH ESTIMATED AVERAGE GLUCOSE RESULT: 6.1 % — HIGH (ref 4–5.6)
ALBUMIN SERPL ELPH-MCNC: 3.7 G/DL — SIGNIFICANT CHANGE UP (ref 3.3–5)
ALP SERPL-CCNC: 87 U/L — SIGNIFICANT CHANGE UP (ref 40–120)
ALT FLD-CCNC: 7 U/L — LOW (ref 10–45)
ANION GAP SERPL CALC-SCNC: 15 MMOL/L — SIGNIFICANT CHANGE UP (ref 5–17)
AST SERPL-CCNC: 101 U/L — HIGH (ref 10–40)
BILIRUB SERPL-MCNC: 0.4 MG/DL — SIGNIFICANT CHANGE UP (ref 0.2–1.2)
BUN SERPL-MCNC: 23 MG/DL — SIGNIFICANT CHANGE UP (ref 7–23)
CALCIUM SERPL-MCNC: 9.2 MG/DL — SIGNIFICANT CHANGE UP (ref 8.4–10.5)
CHLORIDE SERPL-SCNC: 102 MMOL/L — SIGNIFICANT CHANGE UP (ref 96–108)
CO2 SERPL-SCNC: 19 MMOL/L — LOW (ref 22–31)
CREAT SERPL-MCNC: 1.09 MG/DL — SIGNIFICANT CHANGE UP (ref 0.5–1.3)
EGFR: 66 ML/MIN/1.73M2 — SIGNIFICANT CHANGE UP
EGFR: 66 ML/MIN/1.73M2 — SIGNIFICANT CHANGE UP
ESTIMATED AVERAGE GLUCOSE: 128 MG/DL — HIGH (ref 68–114)
FLUAV H1 2009 PAND RNA SPEC QL NAA+PROBE: DETECTED
GLUCOSE BLDC GLUCOMTR-MCNC: 111 MG/DL — HIGH (ref 70–99)
GLUCOSE BLDC GLUCOMTR-MCNC: 133 MG/DL — HIGH (ref 70–99)
GLUCOSE BLDC GLUCOMTR-MCNC: 153 MG/DL — HIGH (ref 70–99)
GLUCOSE BLDC GLUCOMTR-MCNC: 94 MG/DL — SIGNIFICANT CHANGE UP (ref 70–99)
GLUCOSE SERPL-MCNC: 83 MG/DL — SIGNIFICANT CHANGE UP (ref 70–99)
HCT VFR BLD CALC: 36.7 % — LOW (ref 39–50)
HGB BLD-MCNC: 12.1 G/DL — LOW (ref 13–17)
MCHC RBC-ENTMCNC: 30.7 PG — SIGNIFICANT CHANGE UP (ref 27–34)
MCHC RBC-ENTMCNC: 33 G/DL — SIGNIFICANT CHANGE UP (ref 32–36)
MCV RBC AUTO: 93.1 FL — SIGNIFICANT CHANGE UP (ref 80–100)
NRBC BLD AUTO-RTO: 0 /100 WBCS — SIGNIFICANT CHANGE UP (ref 0–0)
PLATELET # BLD AUTO: 157 K/UL — SIGNIFICANT CHANGE UP (ref 150–400)
POTASSIUM SERPL-MCNC: 3.8 MMOL/L — SIGNIFICANT CHANGE UP (ref 3.5–5.3)
POTASSIUM SERPL-SCNC: 3.8 MMOL/L — SIGNIFICANT CHANGE UP (ref 3.5–5.3)
PROT SERPL-MCNC: 7.2 G/DL — SIGNIFICANT CHANGE UP (ref 6–8.3)
RAPID RVP RESULT: DETECTED
RBC # BLD: 3.94 M/UL — LOW (ref 4.2–5.8)
RBC # FLD: 13.8 % — SIGNIFICANT CHANGE UP (ref 10.3–14.5)
SARS-COV-2 RNA SPEC QL NAA+PROBE: SIGNIFICANT CHANGE UP
SODIUM SERPL-SCNC: 136 MMOL/L — SIGNIFICANT CHANGE UP (ref 135–145)
WBC # BLD: 5.87 K/UL — SIGNIFICANT CHANGE UP (ref 3.8–10.5)
WBC # FLD AUTO: 5.87 K/UL — SIGNIFICANT CHANGE UP (ref 3.8–10.5)

## 2025-03-20 PROCEDURE — 71250 CT THORAX DX C-: CPT | Mod: 26

## 2025-03-20 RX ADMIN — GABAPENTIN 100 MILLIGRAM(S): 400 CAPSULE ORAL at 05:38

## 2025-03-20 RX ADMIN — Medication 1 TABLET(S): at 05:38

## 2025-03-20 RX ADMIN — Medication 1 TABLET(S): at 17:55

## 2025-03-20 RX ADMIN — Medication 1 TABLET(S): at 12:09

## 2025-03-20 RX ADMIN — Medication 1 DROP(S): at 13:28

## 2025-03-20 RX ADMIN — Medication 150 MILLIGRAM(S): at 12:09

## 2025-03-20 RX ADMIN — OSELTAMIVIR PHOSPHATE 30 MILLIGRAM(S): 75 CAPSULE ORAL at 05:38

## 2025-03-20 RX ADMIN — GABAPENTIN 100 MILLIGRAM(S): 400 CAPSULE ORAL at 13:28

## 2025-03-20 RX ADMIN — OSELTAMIVIR PHOSPHATE 30 MILLIGRAM(S): 75 CAPSULE ORAL at 17:54

## 2025-03-20 RX ADMIN — Medication 1 DROP(S): at 21:22

## 2025-03-20 RX ADMIN — LISINOPRIL 40 MILLIGRAM(S): 5 TABLET ORAL at 05:38

## 2025-03-20 RX ADMIN — LATANOPROST PF 1 DROP(S): 0.05 SOLUTION/ DROPS OPHTHALMIC at 21:23

## 2025-03-20 RX ADMIN — IPRATROPIUM BROMIDE AND ALBUTEROL SULFATE 3 MILLILITER(S): .5; 2.5 SOLUTION RESPIRATORY (INHALATION) at 05:38

## 2025-03-20 RX ADMIN — ATORVASTATIN CALCIUM 20 MILLIGRAM(S): 80 TABLET, FILM COATED ORAL at 21:22

## 2025-03-20 RX ADMIN — BRIMONIDINE TARTRATE 1 DROP(S): 1.5 SOLUTION/ DROPS OPHTHALMIC at 05:38

## 2025-03-20 RX ADMIN — ENOXAPARIN SODIUM 40 MILLIGRAM(S): 100 INJECTION SUBCUTANEOUS at 17:56

## 2025-03-20 RX ADMIN — Medication 1 TABLET(S): at 23:03

## 2025-03-20 RX ADMIN — CEFTRIAXONE 100 MILLIGRAM(S): 500 INJECTION, POWDER, FOR SOLUTION INTRAMUSCULAR; INTRAVENOUS at 18:21

## 2025-03-20 RX ADMIN — TAMSULOSIN HYDROCHLORIDE 0.4 MILLIGRAM(S): 0.4 CAPSULE ORAL at 21:22

## 2025-03-20 RX ADMIN — GABAPENTIN 100 MILLIGRAM(S): 400 CAPSULE ORAL at 21:22

## 2025-03-20 RX ADMIN — ESCITALOPRAM OXALATE 5 MILLIGRAM(S): 20 TABLET ORAL at 12:09

## 2025-03-20 RX ADMIN — DORZOLAMIDE HYDROCHLORIDE AND TIMOLOL MALEATE 1 DROP(S): 20; 5 SOLUTION/ DROPS OPHTHALMIC at 05:38

## 2025-03-20 RX ADMIN — IPRATROPIUM BROMIDE AND ALBUTEROL SULFATE 3 MILLILITER(S): .5; 2.5 SOLUTION RESPIRATORY (INHALATION) at 19:10

## 2025-03-20 RX ADMIN — DORZOLAMIDE HYDROCHLORIDE AND TIMOLOL MALEATE 1 DROP(S): 20; 5 SOLUTION/ DROPS OPHTHALMIC at 17:55

## 2025-03-20 RX ADMIN — Medication 250 MILLIGRAM(S): at 20:28

## 2025-03-20 RX ADMIN — BRIMONIDINE TARTRATE 1 DROP(S): 1.5 SOLUTION/ DROPS OPHTHALMIC at 18:24

## 2025-03-20 RX ADMIN — IPRATROPIUM BROMIDE AND ALBUTEROL SULFATE 3 MILLILITER(S): .5; 2.5 SOLUTION RESPIRATORY (INHALATION) at 23:03

## 2025-03-20 RX ADMIN — IPRATROPIUM BROMIDE AND ALBUTEROL SULFATE 3 MILLILITER(S): .5; 2.5 SOLUTION RESPIRATORY (INHALATION) at 12:09

## 2025-03-20 NOTE — PROGRESS NOTE ADULT - ASSESSMENT
85 yo M with a PMH of Parkinson's disease, BPH , HTN, HLD, DM, UTIs, from home p/w lethargy. Since Saturday pt has been experiencing low grade fevers and non prod cough. Today tmax  of 100.4, was given Tylenol 650mg at 1020AM. Family states one of pts son's was sick with URI last week and was around pt at that time. Pt has been generally weak, usually ambulates with cane but has been requiring more assistance over the last few days. He reported to family that he fell last night, slid off the bed but denies head trauma. Was able to bear weight after fall and ambulate today however again with assistance. Denies nausea, vomiting, chest pain, SOB, abd pain, diarrhea, urinary complaints, headache, dizziness. Of note, family called pts PMD and informed them of sxs    Acute hypoxic respiratory failure sec to influenza A with superimposed pneumonia  - cw oxygen support  - fu cultures  - cw rocephin and azithromycin  - cw tamiflu  - will monitor     UTI  - cw rocephin  - fu cultures    Parkinson's disease  - cw sinemet  - neuro fu as outpt     DM  - monitor FS  - ISS    DVT prophylaxis

## 2025-03-20 NOTE — CONSULT NOTE ADULT - ASSESSMENT
85 yo M with a PMH of Parkinson's disease, BPH , HTN, HLD, DM, UTIs, from home p/w lethargy. Since Saturday pt has been experiencing low grade fevers and non prod cough. Today tmax  of 100.4, was given Tylenol 650mg at 1020AM. Family states one of pts son's was sick with URI last week and was around pt at that time. Pt has been generally weak, usually ambulates with cane but has been requiring more assistance over the last few days. He reported to family that he fell last night, slid off the bed but denies head trauma. Was able to bear weight after fall and ambulate today however again with assistance. Denies nausea, vomiting, chest pain, SOB, abd pain, diarrhea, urinary complaints, headache, dizziness. Of note, family called pts PMD and informed them of sxs (19 Mar 2025 17:03)  he is found to influenza:   spoke to his son : he never smoked:  he had had pneumonia before   5 years ago, :  not on any pumps and nebs:     Influenza/pneumonia  Parkinsosn disease  HTN  DM  HLD/BPH    Influenza/pneumonia  HAS HAD LOW GRADE TEMPERATURE AND ONE OF HIS RELATIVE WAS SICK AT HOME:  now he is found to have influenza:   cxr with subtle rll infiltrate:  not convincing for pneumonia to me   do ct chest non contrast    he is already on rx for influenza and CAP:   ct scan chest will clarify further :  VBG with no co2 retention ;    Parkinsosn disease  carbidopa/levodopa  25/100 1 Tablet(s) Oral four times a day    HTN  lisinopril 40 milliGRAM(s) Oral daily    DM  cont to monitor and control    HLD/BPH  tamsulosin 0.4 milliGRAM(s) Oral at bedtime    ? depression  escitalopram 5 milliGRAM(s) Oral daily  traZODone 150 milliGRAM(s) Oral daily    dw acp

## 2025-03-20 NOTE — PROGRESS NOTE ADULT - ASSESSMENT
Flu A / PNA  recommend broad spectrum abx  consider CT of chest   consider ID/Pulm eval     HTN  cont current meds   monitor for O.H. given Parkinson   high risk of autonomic dysfunction     HLD  on statin     DM II  Monitor finger stick. Insulin coverage. Diabetic education and Diabetic diet. Consider nutrition consultation.     Advanced care planning was discussed with patient and family.  Risks, benefits and alternatives of the cardiac treatments and medical therapy including procedures were discussed in detail and all questions were answered. Importance of compliance with medical therapy and lifestyle modification to improve cardiovascular health were addressed. Appropriate forms and patient educational materials were reviewed. 30 minutes face to face spent.  discussed with son

## 2025-03-20 NOTE — CONSULT NOTE ADULT - ASSESSMENT
83 yo M with Parkinson's disease, BPH , HTN, HLD, DM, UTIs, from home p/w lethargy. Since Saturday pt has been experiencing low grade fevers and non prod cough. Today tmax  of 100.4, was given Tylenol 650mg at 1020AM. Family states one of pts son's was sick with URI last week and was around pt at that time. Pt has been generally weak, usually ambulates with cane but has been requiring more assistance over the last few days. He reported to family that he fell last night, slid off the bed but denies head trauma. Was able to bear weight after fall and ambulate today however again with assistance. Denies nausea, vomiting, chest pain, SOB, abd pain, diarrhea, urinary complaints, headache, dizziness. Of note, family called pts PMD and informed them of sxs    AAOx1-2, slurred a bit ,+ EPS and tremor B/L 2/2 PD, walker baseline   + flu   CTH neg expecpt suspcted sinusitis  CT C spine mulitlevel degnertive changes     ImprssioN:   1) AMS likely toxic metabolic encephalopthy from infection/flu/sinusitis   2) parkinsons dementia     - no change in home sinemet getting 25/100 4x/day  - treatment of infection on CTX and azitrhomycin now ; tamiflu  - infectous workup in progrss  - unclear if would be able to get MRI brain with cochlear implant. hold off for now.  CTH if change in exam  - check orthostatics   - PT/OT   - check FS, glucose control <180  - GI/DVT ppx   Thank you for allowing me to participate in the care of this patient. Call with questions.     Ky Goff MD  Vascular Neurology  Office: [phone]

## 2025-03-20 NOTE — CONSULT NOTE ADULT - SUBJECTIVE AND OBJECTIVE BOX
CHIEF COMPLAINT:Patient is a 84y old  Male who presents with a chief complaint of     HISTORY OF PRESENT ILLNESS:    85 yo M with a PMH of Parkinson's disease, BPH , HTN, HLD, DM, UTIs, from home p/w lethargy. Since Saturday pt has been experiencing low grade fevers and non prod cough. Today tmax  of 100.4, was given Tylenol 650mg at 1020AM. Family states one of pts son's was sick with URI last week and was around pt at that time. Pt has been generally weak, usually ambulates with cane but has been requiring more assistance over the last few days. He reported to family that he fell last night, slid off the bed but denies head trauma. Was able to bear weight after fall and ambulate today however again with assistance. Denies nausea, vomiting, chest pain, SOB, abd pain, diarrhea, urinary complaints, headache, dizziness. Of note, family called pts PMD and informed them of sxs. Pt was prescribed Bactrim, received one dose this AM.  now admitted with Flu A and possible PNA   no cp   no dizziness  pos falls in the past    PAST MEDICAL & SURGICAL HISTORY:  Parkinsons      HTN (hypertension)      Diabetes      Cochlear implant status              MEDICATIONS:                  FAMILY HISTORY:  No pertinent family history in first degree relatives        Non-contributory    SOCIAL HISTORY:    No tobacco, drugs or etoh    Allergies    No Known Allergies    Intolerances    	    REVIEW OF SYSTEMS:  as above  The rest of the 14 points ROS reviewed and except above they are unremarkable.        PHYSICAL EXAM:  T(C): 37.4 (03-19-25 @ 14:32), Max: 38.4 (03-19-25 @ 12:32)  HR: 83 (03-19-25 @ 14:32) (83 - 105)  BP: 138/68 (03-19-25 @ 14:32) (100/55 - 138/68)  RR: 19 (03-19-25 @ 14:32) (19 - 20)  SpO2: 98% (03-19-25 @ 14:32) (95% - 99%)  Wt(kg): --  I&O's Summary      JVP: Normal  Neck: supple  Lung: clear   CV: S1 S2 , Murmur:  Abd: soft  Ext: No edema  neuro: Awake / alert  Psych: flat affect  Skin: normal      LABS/DATA:    TELEMETRY: 	    ECG:  	   	  CARDIAC MARKERS:                        24 <<== 03-19-25 @ 16:14                27 <<== 03-19-25 @ 12:40                              12.0   5.85  )-----------( 163      ( 19 Mar 2025 12:40 )             35.3     03-19    137  |  101  |  28[H]  ----------------------------<  102[H]  3.7   |  19[L]  |  1.28    Ca    9.6      19 Mar 2025 12:40  Mg     2.0     03-19    TPro  7.6  /  Alb  3.9  /  TBili  0.4  /  DBili  x   /  AST  46[H]  /  ALT  9[L]  /  AlkPhos  97  03-19    proBNP:   Lipid Profile:   HgA1c:   TSH:           
  03-20-25 @ 10:15    Patient is a 84y old  Male who presents with a chief complaint of lethargy  and SOB (20 Mar 2025 09:32)      HPI:   83 yo M with a PMH of Parkinson's disease, BPH , HTN, HLD, DM, UTIs, from home p/w lethargy. Since Saturday pt has been experiencing low grade fevers and non prod cough. Today tmax  of 100.4, was given Tylenol 650mg at 1020AM. Family states one of pts son's was sick with URI last week and was around pt at that time. Pt has been generally weak, usually ambulates with cane but has been requiring more assistance over the last few days. He reported to family that he fell last night, slid off the bed but denies head trauma. Was able to bear weight after fall and ambulate today however again with assistance. Denies nausea, vomiting, chest pain, SOB, abd pain, diarrhea, urinary complaints, headache, dizziness. Of note, family called pts PMD and informed them of sxs (19 Mar 2025 17:03)      ?FOLLOWING PRESENT  [ ] Hx of PE/DVT, [ ] Hx COPD, [ ] Hx of Asthma, [ ] Hx of Hospitalization, [ ]  Hx of BiPAP/CPAP use, [ ] Hx of SHIRA    Allergies    No Known Allergies    Intolerances        PAST MEDICAL & SURGICAL HISTORY:  Parkinsons      HTN (hypertension)      Diabetes      Cochlear implant status          FAMILY HISTORY:  No pertinent family history in first degree relatives        Social History: [  ] TOBACCO                  [  ] ETOH                                 [  ] IVDA/DRUGS    REVIEW OF SYSTEMS      General:	    Skin/Breast:  	  Ophthalmologic:  	  ENMT:	    Respiratory and Thorax:  	  Cardiovascular:	    Gastrointestinal:	    Genitourinary:	    Musculoskeletal:	    Neurological:	    Psychiatric:	    Hematology/Lymphatics:	    Endocrine:	    Allergic/Immunologic:	    MEDICATIONS  (STANDING):  acetaminophen   IVPB .. 1000 milliGRAM(s) IV Intermittent once  albuterol/ipratropium for Nebulization 3 milliLiter(s) Nebulizer every 6 hours  artificial  tears Solution 1 Drop(s) Both EYES three times a day  atorvastatin 20 milliGRAM(s) Oral at bedtime  azithromycin  IVPB 500 milliGRAM(s) IV Intermittent every 24 hours  brimonidine 0.2% Ophthalmic Solution 1 Drop(s) Both EYES two times a day  carbidopa/levodopa  25/100 1 Tablet(s) Oral four times a day  cefTRIAXone   IVPB 1000 milliGRAM(s) IV Intermittent every 24 hours  dextrose 5%. 1000 milliLiter(s) (50 mL/Hr) IV Continuous <Continuous>  dextrose 5%. 1000 milliLiter(s) (100 mL/Hr) IV Continuous <Continuous>  dextrose 50% Injectable 25 Gram(s) IV Push once  dextrose 50% Injectable 12.5 Gram(s) IV Push once  dextrose 50% Injectable 25 Gram(s) IV Push once  dorzolamide 2%/timolol 0.5% Ophthalmic Solution 1 Drop(s) Both EYES two times a day  enoxaparin Injectable 40 milliGRAM(s) SubCutaneous every 24 hours  escitalopram 5 milliGRAM(s) Oral daily  gabapentin 100 milliGRAM(s) Oral three times a day  glucagon  Injectable 1 milliGRAM(s) IntraMuscular once  insulin lispro (ADMELOG) corrective regimen sliding scale   SubCutaneous three times a day before meals  latanoprost 0.005% Ophthalmic Solution 1 Drop(s) Both EYES at bedtime  lisinopril 40 milliGRAM(s) Oral daily  oseltamivir 30 milliGRAM(s) Oral two times a day  tamsulosin 0.4 milliGRAM(s) Oral at bedtime  traZODone 150 milliGRAM(s) Oral daily    MEDICATIONS  (PRN):  dextrose Oral Gel 15 Gram(s) Oral once PRN Blood Glucose LESS THAN 70 milliGRAM(s)/deciliter       Vital Signs Last 24 Hrs  T(C): 36.7 (20 Mar 2025 05:18), Max: 38.4 (19 Mar 2025 12:32)  T(F): 98.1 (20 Mar 2025 05:18), Max: 101.2 (19 Mar 2025 12:32)  HR: 76 (20 Mar 2025 05:18) (68 - 105)  BP: 150/71 (20 Mar 2025 05:18) (100/55 - 150/71)  BP(mean): 80 (19 Mar 2025 17:12) (75 - 80)  RR: 18 (20 Mar 2025 05:18) (16 - 20)  SpO2: 99% (20 Mar 2025 05:18) (95% - 99%)    Parameters below as of 20 Mar 2025 05:18  Patient On (Oxygen Delivery Method): nasal cannula  O2 Flow (L/min): 2  Orthostatic VS          I&O's Summary      Physical Exam:   GENERAL: NAD, well-groomed, well-developed  HEENT: RAYMOND/   Atraumatic, Normocephalic  ENMT: No tonsillar erythema, exudates, or enlargement; Moist mucous membranes, Good dentition, No lesions  NECK: Supple, No JVD, Normal thyroid  CHEST/LUNG: Clear to auscultation bilaterally; No rales, rhonchi, wheezing, or rubs  CVS: Regular rate and rhythm; No murmurs, rubs, or gallops  GI: : Soft, Nontender, Nondistended; Bowel sounds present  NERVOUS SYSTEM:  Alert & Oriented X3, Good concentration; Motor Strength 5/5 B/L upper and lower extremities; DTRs 2+ intact and symmetric  EXTREMITIES:  2+ Peripheral Pulses, No clubbing, cyanosis, or edema  LYMPH: No lymphadenopathy noted  SKIN: No rashes or lesions  ENDOCRINOLOGY: No Thyromegaly  PSYCH: Appropriate    Labs:  -2.2<37<4>>63<<7.395>>-2.2<<3><<4><<5<<639>>                            12.1   5.87  )-----------( 157      ( 20 Mar 2025 06:55 )             36.7                         12.0   5.85  )-----------( 163      ( 19 Mar 2025 12:40 )             35.3     03-20    136  |  102  |  23  ----------------------------<  83  3.8   |  19[L]  |  1.09  03-19    137  |  101  |  28[H]  ----------------------------<  102[H]  3.7   |  19[L]  |  1.28    Ca    9.2      20 Mar 2025 06:55  Ca    9.6      19 Mar 2025 12:40  Mg     2.0     03-19    TPro  7.2  /  Alb  3.7  /  TBili  0.4  /  DBili  x   /  AST  101[H]  /  ALT  7[L]  /  AlkPhos  87  03-20  TPro  7.6  /  Alb  3.9  /  TBili  0.4  /  DBili  x   /  AST  46[H]  /  ALT  9[L]  /  AlkPhos  97  03-19    CAPILLARY BLOOD GLUCOSE      POCT Blood Glucose.: 94 mg/dL (20 Mar 2025 07:28)  POCT Blood Glucose.: 104 mg/dL (19 Mar 2025 21:20)    LIVER FUNCTIONS - ( 20 Mar 2025 06:55 )  Alb: 3.7 g/dL / Pro: 7.2 g/dL / ALK PHOS: 87 U/L / ALT: 7 U/L / AST: 101 U/L / GGT: x           PT/INR - ( 19 Mar 2025 12:40 )   PT: 12.1 sec;   INR: 1.05 ratio         PTT - ( 19 Mar 2025 12:40 )  PTT:28.8 sec  Urinalysis Basic - ( 20 Mar 2025 06:55 )    Color: x / Appearance: x / SG: x / pH: x  Gluc: 83 mg/dL / Ketone: x  / Bili: x / Urobili: x   Blood: x / Protein: x / Nitrite: x   Leuk Esterase: x / RBC: x / WBC x   Sq Epi: x / Non Sq Epi: x / Bacteria: x      D DImer      Studies  Chest X-RAY  CT SCAN Chest   CT Abdomen  Venous Dopplers: LE:   Others                  
  03-20-25 @ 15:03    Patient is a 84y old  Male who presents with a chief complaint of lethargy  and SOB (20 Mar 2025 13:39)      HPI:   83 yo M with a PMH of Parkinson's disease, BPH , HTN, HLD, DM, UTIs, from home p/w lethargy. Since Saturday pt has been experiencing low grade fevers and non prod cough. Today tmax  of 100.4, was given Tylenol 650mg at 1020AM. Family states one of pts son's was sick with URI last week and was around pt at that time. Pt has been generally weak, usually ambulates with cane but has been requiring more assistance over the last few days. He reported to family that he fell last night, slid off the bed but denies head trauma. Was able to bear weight after fall and ambulate today however again with assistance. Denies nausea, vomiting, chest pain, SOB, abd pain, diarrhea, urinary complaints, headache, dizziness. Of note, family called pts PMD and informed them of sxs (19 Mar 2025 17:03)  he is found to influenza:   spoke to his son : he never smoked:  he had had pneumonia before   5 years ago, :  not on any pumps and nebs:       ?FOLLOWING PRESENT  [x] Hx of PE/DVT, [ x] Hx COPD, [ x] Hx of Asthma, [y ] Hx of Hospitalization, [ x]  Hx of BiPAP/CPAP use, [ x] Hx of SHIRA    Allergies    No Known Allergies    Intolerances        PAST MEDICAL & SURGICAL HISTORY:  Parkinsons      HTN (hypertension)      Diabetes      Cochlear implant status          FAMILY HISTORY:  No pertinent family history in first degree relatives        Social History: [ x ] TOBACCO                  [  x] ETOH                                 [ x ] IVDA/DRUGS    REVIEW OF SYSTEMS      General:	x    Skin/Breast:x  	  Ophthalmologic:x  	  ENMT:	x    Respiratory and Thorax:  fever,  letahrgic  	  Cardiovascular:	x    Gastrointestinal:	x    Genitourinary:	x    Musculoskeletal:	x    Neurological:x	    Psychiatric:	x    Hematology/Lymphatics:	x    Endocrine:	x    Allergic/Immunologic:	x    MEDICATIONS  (STANDING):  acetaminophen   IVPB .. 1000 milliGRAM(s) IV Intermittent once  albuterol/ipratropium for Nebulization 3 milliLiter(s) Nebulizer every 6 hours  artificial  tears Solution 1 Drop(s) Both EYES three times a day  atorvastatin 20 milliGRAM(s) Oral at bedtime  azithromycin  IVPB 500 milliGRAM(s) IV Intermittent every 24 hours  brimonidine 0.2% Ophthalmic Solution 1 Drop(s) Both EYES two times a day  carbidopa/levodopa  25/100 1 Tablet(s) Oral four times a day  cefTRIAXone   IVPB 1000 milliGRAM(s) IV Intermittent every 24 hours  dextrose 5%. 1000 milliLiter(s) (50 mL/Hr) IV Continuous <Continuous>  dextrose 5%. 1000 milliLiter(s) (100 mL/Hr) IV Continuous <Continuous>  dextrose 50% Injectable 25 Gram(s) IV Push once  dextrose 50% Injectable 12.5 Gram(s) IV Push once  dextrose 50% Injectable 25 Gram(s) IV Push once  dorzolamide 2%/timolol 0.5% Ophthalmic Solution 1 Drop(s) Both EYES two times a day  enoxaparin Injectable 40 milliGRAM(s) SubCutaneous every 24 hours  escitalopram 5 milliGRAM(s) Oral daily  gabapentin 100 milliGRAM(s) Oral three times a day  lisinopril 40 milliGRAM(s) Oral daily  latanoprost 0.005% Ophthalmic Solution 1 Drop(s) Both EYES at bedtime  lisinopril 40 milliGRAM(s) Oral daily  oseltamivir 30 milliGRAM(s) Oral two times a day  tamsulosin 0.4 milliGRAM(s) Oral at bedtime  traZODone 150 milliGRAM(s) Oral daily    MEDICATIONS  (PRN):  dextrose Oral Gel 15 Gram(s) Oral once PRN Blood Glucose LESS THAN 70 milliGRAM(s)/deciliter       Vital Signs Last 24 Hrs  T(C): 36.9 (20 Mar 2025 11:20), Max: 36.9 (20 Mar 2025 11:20)  T(F): 98.4 (20 Mar 2025 11:20), Max: 98.4 (20 Mar 2025 11:20)  HR: 75 (20 Mar 2025 13:09) (68 - 76)  BP: 131/71 (20 Mar 2025 11:20) (112/56 - 150/71)  BP(mean): 80 (19 Mar 2025 17:12) (80 - 80)  RR: 18 (20 Mar 2025 11:20) (16 - 19)  SpO2: 91% (20 Mar 2025 11:20) (91% - 99%)    Parameters below as of 20 Mar 2025 11:20  Patient On (Oxygen Delivery Method): nasal cannula  O2 Flow (L/min): 1  Orthostatic VS          I&O's Summary      Physical Exam:   GENERAL: NAD, well-groomed, well-developed  HEENT: RAYMOND/   Atraumatic, Normocephalic  ENMT: No tonsillar erythema, exudates, or enlargement; Moist mucous membranes, Good dentition, No lesions  NECK: Supple, No JVD, Normal thyroid  CHEST/LUNG: mild coarse rhonchi +  CVS: Regular rate and rhythm; No murmurs, rubs, or gallops  GI: : Soft, Nontender, Nondistended; Bowel sounds present  NERVOUS SYSTEM:  open eyes and tries to respond:  not in any resp distress   EXTREMITIES: - edema  LYMPH: No lymphadenopathy noted  SKIN: No rashes or lesions  ENDOCRINOLOGY: No Thyromegaly  PSYCH: calm   Labs:  -2.2<37<4>>63<<7.395>>-2.2<<3><<4><<5<<639>>SARS-CoV-2: NotDetec (19 Mar 2025 12:38)                              12.1   5.87  )-----------( 157      ( 20 Mar 2025 06:55 )             36.7                         12.0   5.85  )-----------( 163      ( 19 Mar 2025 12:40 )             35.3     03-20    136  |  102  |  23  ----------------------------<  83  3.8   |  19[L]  |  1.09  03-19    137  |  101  |  28[H]  ----------------------------<  102[H]  3.7   |  19[L]  |  1.28    Ca    9.2      20 Mar 2025 06:55  Ca    9.6      19 Mar 2025 12:40  Mg     2.0     03-19    TPro  7.2  /  Alb  3.7  /  TBili  0.4  /  DBili  x   /  AST  101[H]  /  ALT  7[L]  /  AlkPhos  87  03-20  TPro  7.6  /  Alb  3.9  /  TBili  0.4  /  DBili  x   /  AST  46[H]  /  ALT  9[L]  /  AlkPhos  97  03-19    CAPILLARY BLOOD GLUCOSE      POCT Blood Glucose.: 111 mg/dL (20 Mar 2025 11:28)  POCT Blood Glucose.: 94 mg/dL (20 Mar 2025 07:28)  POCT Blood Glucose.: 104 mg/dL (19 Mar 2025 21:20)    LIVER FUNCTIONS - ( 20 Mar 2025 06:55 )  Alb: 3.7 g/dL / Pro: 7.2 g/dL / ALK PHOS: 87 U/L / ALT: 7 U/L / AST: 101 U/L / GGT: x           PT/INR - ( 19 Mar 2025 12:40 )   PT: 12.1 sec;   INR: 1.05 ratio         PTT - ( 19 Mar 2025 12:40 )  PTT:28.8 sec  Urinalysis Basic - ( 20 Mar 2025 06:55 )    Color: x / Appearance: x / SG: x / pH: x  Gluc: 83 mg/dL / Ketone: x  / Bili: x / Urobili: x   Blood: x / Protein: x / Nitrite: x   Leuk Esterase: x / RBC: x / WBC x   Sq Epi: x / Non Sq Epi: x / Bacteria: x      D DImer      Studies  Chest X-RAY  CT SCAN Chest   CT Abdomen  Venous Dopplers: LE:   Others      rad< from: Xray Chest 1 View AP/PA (03.19.25 @ 13:41) >    CLINICAL INDICATION: fever, cough    TECHNIQUE: Single frontal view of the chest was obtained.    COMPARISON: None.    FINDINGS:    Right apical pleural thickening. Patchy right lower lung opacity. No   large pleural effusion or pneumothorax.  The heart size cannot be accurately assessed on this projection.  No acute osseous abnormalities.      IMPRESSION:  Patchy right lower lung opacity, may represent atelectasis or pneumonia    --- End of Report ---          LUIS FELIPE SABA MD; Resident Radiologist  This document has been electronically signed.  TIARA NAIK MD; Attending Radiologist  This document has been electronically signed. Mar 19 2025  4:06PM    < end of copied text >              
[facility]  CAYETANO Wilder S. Shah, Y. Patel, G. Casimir  [phone]  ([phone] - weekdays after 5pm and weekends)    JENNIFER CANTOR  84y, Male  [id number]    HPI:  Patient is a 84 year old male with PMH of Parkinson's disease, BPH, HTN, HLD, DM, UTIs, from home who presents with lethargy. Since Saturday patient has been experiencing low grade fevers and non productive cough and yesterday had fever of 100.4F, was given Tylenol 650mg at 1020AM. Family states one of patients son's was sick with URI last week and was around patient at that time. Patient has been generally weak, usually ambulates with cane but has been requiring more assistance over the last few days. He reported to family that he fell last night, slid off the bed but denies head trauma. Was able to bear weight after fall and ambulate today however again with assistance. Denies nausea, vomiting, chest pain, SOB, abd pain, diarrhea, urinary complaints, headache, dizziness.  ROS: limited, pertinent positives and negatives as per HPI    Allergies: No Known Allergies  PMH -- Parkinsons, HTN (hypertension), Diabetes, BPH  PSH -- Cochlear implant status  FH -- No pertinent family history in first degree relatives  Social History -- denies tobacco, alcohol or illicit drug use    Physical Exam--  Vital Signs Last 24 Hrs  T(F): 98.1 (20 Mar 2025 05:18), Max: 101.2 (19 Mar 2025 12:32)  HR: 76 (20 Mar 2025 05:18) (68 - 89)  BP: 150/71 (20 Mar 2025 05:18) (100/55 - 150/71)  RR: 18 (20 Mar 2025 05:18) (16 - 20)  SpO2: 99% (20 Mar 2025 05:18) (95% - 99%)  General: no acute distress  HEENT: NC/AT, EOMI, anicteric  Lungs: decreased breath sounds b/l   Heart: S1, S2 present, normal rate  Abdomen: Soft. ND. NT. BS present.   Neuro: AAOx3, no obvious focal deficits   Extremities: No cyanosis. No edema.   Skin: Warm. Dry. No visible rash.   Lines: PIV     Laboratory & Imaging Data--  CBC:                       12.1   5.87  )-----------( 157      ( 20 Mar 2025 06:55 )             36.7     WBC Count: 5.87 K/uL (03-20-25 @ 06:55)  WBC Count: 5.85 K/uL (03-19-25 @ 12:40)    CMP: 03-20    136  |  102  |  23  ----------------------------<  83  3.8   |  19[L]  |  1.09    Ca    9.2      20 Mar 2025 06:55  Mg     2.0     03-19    TPro  7.2  /  Alb  3.7  /  TBili  0.4  /  DBili  x   /  AST  101[H]  /  ALT  7[L]  /  AlkPhos  87  03-20    LIVER FUNCTIONS - ( 20 Mar 2025 06:55 )  Alb: 3.7 g/dL / Pro: 7.2 g/dL / ALK PHOS: 87 U/L / ALT: 7 U/L / AST: 101 U/L / GGT: x           Urinalysis (03.19.25 @ 16:14)    Blood, Urine: Large   pH Urine: 6.0   Glucose Qualitative, Urine: Negative mg/dL   Color: Dark Yellow   Urine Appearance: Cloudy   Bilirubin: Negative   Ketone - Urine: Trace mg/dL   Specific Gravity: 1.026   Protein, Urine: 100 mg/dL   Urobilinogen: 1.0 mg/dL   Nitrite: Positive   Leukocyte Esterase Concentration: Moderate  Urine Microscopic-Add On (NC) (03.19.25 @ 16:14)    White Blood Cell - Urine: 164 /HPF   Red Blood Cell - Urine: 2 /HPF   Bacteria: Negative /HPF   Cast: 11 /LPF   Hyaline Casts: Present   Epithelial Cells: 2 /HPF   Calcium Oxalate Crystals: Present   Amorphous Precipitates: Present   Review: Reviewed    Microbiology: reviewed  03-19-25 @ 12:38  SARS-CoV-2 NotDetec  Influenza A Detected  Influenza B NotDetec  RSV NotDetec    Radiology--reviewed  < from: CT Cervical Spine No Cont (03.19.25 @ 14:25) >  IMPRESSION:    CT HEAD:  No acute intracranial hemorrhage, mass effect, or midline shift.  Suspected sinusitis.  Right-sided cochlear implant.    CT CERVICAL SPINE:  No acutefracture or traumatic subluxation.    Multi-level degenerative changes.  --- End of Report ---    < end of copied text >    < from: Xray Chest 1 View AP/PA (03.19.25 @ 13:41) >  IMPRESSION:  Patchy right lower lung opacity, may represent atelectasis or pneumonia    --- End of Report ---    < end of copied text >    < from: Xray Pelvis AP only (03.19.25 @ 13:40) >  IMPRESSION:  No hip fractures or dislocations.    Intact pelvic and obturator rings and symmetrically aligned and spaced SI   joints and pubic symphysis.    Degenerative spurring along peripheral acetabular articular margins   otherwise preserved bilateral hip joint spaces. No gross radiographic   evidence for AVN.    Generalized osteopenia otherwise no discrete lytic or blastic lesions.    --- End of Report ---    < end of copied text >      Active Medications--  acetaminophen   IVPB .. 1000 milliGRAM(s) IV Intermittent once  albuterol/ipratropium for Nebulization 3 milliLiter(s) Nebulizer every 6 hours  artificial  tears Solution 1 Drop(s) Both EYES three times a day  atorvastatin 20 milliGRAM(s) Oral at bedtime  azithromycin  IVPB 500 milliGRAM(s) IV Intermittent every 24 hours  brimonidine 0.2% Ophthalmic Solution 1 Drop(s) Both EYES two times a day  carbidopa/levodopa  25/100 1 Tablet(s) Oral four times a day  cefTRIAXone   IVPB 1000 milliGRAM(s) IV Intermittent every 24 hours  dextrose 5%. 1000 milliLiter(s) IV Continuous <Continuous>  dextrose 5%. 1000 milliLiter(s) IV Continuous <Continuous>  dextrose 50% Injectable 25 Gram(s) IV Push once  dextrose 50% Injectable 12.5 Gram(s) IV Push once  dextrose 50% Injectable 25 Gram(s) IV Push once  dextrose Oral Gel 15 Gram(s) Oral once PRN  dorzolamide 2%/timolol 0.5% Ophthalmic Solution 1 Drop(s) Both EYES two times a day  enoxaparin Injectable 40 milliGRAM(s) SubCutaneous every 24 hours  escitalopram 5 milliGRAM(s) Oral daily  gabapentin 100 milliGRAM(s) Oral three times a day  glucagon  Injectable 1 milliGRAM(s) IntraMuscular once  insulin lispro (ADMELOG) corrective regimen sliding scale   SubCutaneous three times a day before meals  latanoprost 0.005% Ophthalmic Solution 1 Drop(s) Both EYES at bedtime  lisinopril 40 milliGRAM(s) Oral daily  oseltamivir 30 milliGRAM(s) Oral two times a day  tamsulosin 0.4 milliGRAM(s) Oral at bedtime  traZODone 150 milliGRAM(s) Oral daily    Current Antimicrobials:   azithromycin  IVPB 500 milliGRAM(s) IV Intermittent every 24 hours  cefTRIAXone   IVPB 1000 milliGRAM(s) IV Intermittent every 24 hours  oseltamivir 30 milliGRAM(s) Oral two times a day    Prior/Completed Antimicrobials:  azithromycin  IVPB  cefTRIAXone   IVPB  oseltamivir
Neurology Consult    Reason for Consult: Patient is a 84y old  Male who presents with a chief complaint of lethargy  and SOB (20 Mar 2025 10:14)      HPI:   83 yo M with a PMH of Parkinson's disease, BPH , HTN, HLD, DM, UTIs, from home p/w lethargy. Since Saturday pt has been experiencing low grade fevers and non prod cough. Today tmax  of 100.4, was given Tylenol 650mg at 1020AM. Family states one of pts son's was sick with URI last week and was around pt at that time. Pt has been generally weak, usually ambulates with cane but has been requiring more assistance over the last few days. He reported to family that he fell last night, slid off the bed but denies head trauma. Was able to bear weight after fall and ambulate today however again with assistance. Denies nausea, vomiting, chest pain, SOB, abd pain, diarrhea, urinary complaints, headache, dizziness. Of note, family called pts PMD and informed them of sxs (19 Mar 2025 17:03)       PAST MEDICAL & SURGICAL HISTORY:  Parkinsons      HTN (hypertension)      Diabetes      Cochlear implant status          Allergies: Allergies    No Known Allergies    Intolerances        Social History: Denies toxic habits including tobacco, ETOH or illicit drugs.    Family History: FAMILY HISTORY:  No pertinent family history in first degree relatives    . No family history of strokes    Medications: MEDICATIONS  (STANDING):  acetaminophen   IVPB .. 1000 milliGRAM(s) IV Intermittent once  albuterol/ipratropium for Nebulization 3 milliLiter(s) Nebulizer every 6 hours  artificial  tears Solution 1 Drop(s) Both EYES three times a day  atorvastatin 20 milliGRAM(s) Oral at bedtime  azithromycin  IVPB 500 milliGRAM(s) IV Intermittent every 24 hours  brimonidine 0.2% Ophthalmic Solution 1 Drop(s) Both EYES two times a day  carbidopa/levodopa  25/100 1 Tablet(s) Oral four times a day  cefTRIAXone   IVPB 1000 milliGRAM(s) IV Intermittent every 24 hours  dextrose 5%. 1000 milliLiter(s) (50 mL/Hr) IV Continuous <Continuous>  dextrose 5%. 1000 milliLiter(s) (100 mL/Hr) IV Continuous <Continuous>  dextrose 50% Injectable 25 Gram(s) IV Push once  dextrose 50% Injectable 12.5 Gram(s) IV Push once  dextrose 50% Injectable 25 Gram(s) IV Push once  dorzolamide 2%/timolol 0.5% Ophthalmic Solution 1 Drop(s) Both EYES two times a day  enoxaparin Injectable 40 milliGRAM(s) SubCutaneous every 24 hours  escitalopram 5 milliGRAM(s) Oral daily  gabapentin 100 milliGRAM(s) Oral three times a day  glucagon  Injectable 1 milliGRAM(s) IntraMuscular once  insulin lispro (ADMELOG) corrective regimen sliding scale   SubCutaneous three times a day before meals  latanoprost 0.005% Ophthalmic Solution 1 Drop(s) Both EYES at bedtime  lisinopril 40 milliGRAM(s) Oral daily  oseltamivir 30 milliGRAM(s) Oral two times a day  tamsulosin 0.4 milliGRAM(s) Oral at bedtime  traZODone 150 milliGRAM(s) Oral daily    MEDICATIONS  (PRN):  dextrose Oral Gel 15 Gram(s) Oral once PRN Blood Glucose LESS THAN 70 milliGRAM(s)/deciliter      Review of Systems: limited given mental stauts   CONSTITUTIONAL:  No weight loss,+ fever, chills, weakness and fatigue.  HEENT:  Eyes:  No visual loss, blurred vision, double vision or yellow sclera. Ears, Nose, Throat:  No hearing loss, sneezing, congestion, runny nose or sore throat.  SKIN:  No rash or itching.  CARDIOVASCULAR:  No chest pain, chest pressure or chest discomfort. No palpitations or edema.  RESPIRATORY:  No shortness of breath, cough or sputum.  GASTROINTESTINAL:  No anorexia, nausea, vomiting or diarrhea. No abdominal pain or blood.  GENITOURINARY:  No burning on urination or incontinence   NEUROLOGICAL:  No headache, dizziness, syncope, paralysis, ataxia, numbness or tingling in the extremities + PD   MUSCULOSKELETAL:  No muscle, back pain, joint pain or stiffness.  HEMATOLOGIC:  No anemia, bleeding or bruising.  LYMPHATICS:  No enlarged nodes. No history of splenectomy.  PSYCHIATRIC:  No history of depression or anxiety.  ENDOCRINOLOGIC:  No reports of sweating, cold or heat intolerance. No polyuria or polydipsia.      Vitals:  Vital Signs Last 24 Hrs  T(C): 36.7 (20 Mar 2025 05:18), Max: 38.4 (19 Mar 2025 12:32)  T(F): 98.1 (20 Mar 2025 05:18), Max: 101.2 (19 Mar 2025 12:32)  HR: 76 (20 Mar 2025 05:18) (68 - 105)  BP: 150/71 (20 Mar 2025 05:18) (100/55 - 150/71)  BP(mean): 80 (19 Mar 2025 17:12) (75 - 80)  RR: 18 (20 Mar 2025 05:18) (16 - 20)  SpO2: 99% (20 Mar 2025 05:18) (95% - 99%)    Parameters below as of 20 Mar 2025 05:18  Patient On (Oxygen Delivery Method): nasal cannula  O2 Flow (L/min): 2    Orthostatic VS    General Exam:   General Appearance: Appropriately dressed and in no acute distress       Head: Normocephalic, atraumatic and no dysmorphic features  Ear, Nose, and Throat: Moist mucous membranes  CVS: S1S2+  Resp: No SOB, no wheeze or rhonchi  GI: soft NT/ND  Extremities: No edema or cyanosis  Skin: No bruises or rashes     Neurological Exam:  Mental Status: Awake, alert and oriented x1-2.  Able to follow simple verbal commands. Able to name and repeat. fluent speech. No obvious aphasia +dysarthria noted.   Cranial Nerves: PERRL, EOMI, VFFC, sensation V1-V3 intact,  no obvious facial asymmetry, equal elevation of palate, scm/trap 5/5, tongue is midline on protrusion. no obvious papilledema on fundoscopic exam. hearing is grossly intact.   Motor: Normal bulk, tone and strength throughout. + EPS and tremor   Sensation: Intact to light touch and pinprick throughout.  .  Coordination: No dysmetria on FNF   Gait: walker baseline     Data/Labs/Imaging which I personally reviewed.     Labs:     CBC Full  -  ( 20 Mar 2025 06:55 )  WBC Count : 5.87 K/uL  RBC Count : 3.94 M/uL  Hemoglobin : 12.1 g/dL  Hematocrit : 36.7 %  Platelet Count - Automated : 157 K/uL  Mean Cell Volume : 93.1 fl  Mean Cell Hemoglobin : 30.7 pg  Mean Cell Hemoglobin Concentration : 33.0 g/dL  Auto Neutrophil # : x  Auto Lymphocyte # : x  Auto Monocyte # : x  Auto Eosinophil # : x  Auto Basophil # : x  Auto Neutrophil % : x  Auto Lymphocyte % : x  Auto Monocyte % : x  Auto Eosinophil % : x  Auto Basophil % : x    03-20    136  |  102  |  23  ----------------------------<  83  3.8   |  19[L]  |  1.09    Ca    9.2      20 Mar 2025 06:55  Mg     2.0     03-19    TPro  7.2  /  Alb  3.7  /  TBili  0.4  /  DBili  x   /  AST  101[H]  /  ALT  7[L]  /  AlkPhos  87  03-20    LIVER FUNCTIONS - ( 20 Mar 2025 06:55 )  Alb: 3.7 g/dL / Pro: 7.2 g/dL / ALK PHOS: 87 U/L / ALT: 7 U/L / AST: 101 U/L / GGT: x           PT/INR - ( 19 Mar 2025 12:40 )   PT: 12.1 sec;   INR: 1.05 ratio         PTT - ( 19 Mar 2025 12:40 )  PTT:28.8 sec  Urinalysis Basic - ( 20 Mar 2025 06:55 )    Color: x / Appearance: x / SG: x / pH: x  Gluc: 83 mg/dL / Ketone: x  / Bili: x / Urobili: x   Blood: x / Protein: x / Nitrite: x   Leuk Esterase: x / RBC: x / WBC x   Sq Epi: x / Non Sq Epi: x / Bacteria: x          < from: CT Head No Cont (03.19.25 @ 14:24) >    ACC: [id number] EXAM:  CT CERVICAL SPINE   ORDERED BY:  LALI LIU     ACC: [id number] EXAM:  CT BRAIN   ORDERED BY:  LALI LIU     PROCEDURE DATE:  03/19/2025          INTERPRETATION:  CLINICAL INFORMATION: sp fall    COMPARISON: headCT 5/22/2024    CONTRAST:  IV Contrast: None    TECHNIQUE:    CT BRAIN: Serial axial images were obtained from the skull base to the   vertex using multi-slice helical technique. Sagittal and coronal   reformats were obtained.    CT CERVICAL SPINE: Axial images were obtained of the cervical spine using   multislice helical technique. Reformatted coronal and sagittal images   were obtained.    FINDINGS:    CT BRAIN:    VENTRICLES AND SULCI: Age appropriate involutional changes.  INTRA-AXIAL: No mass effect, acute hemorrhage, or midline shift.  There   are periventricular and subcortical white matter hypodensities,   consistent with microvascular type changes.  EXTRA-AXIAL: No mass or fluid collection. Basal cisterns are normal in   appearance.    VISUALIZED SINUSES:  Scattered mucosal thickening bilateral air-fluid   levels in the maxillary sinuses.  TYMPANOMASTOID CAVITIES:  Right-sided partial mastoidectomy with cochlear   implant. The remaining right mastoid air cells remain chronically  opacified.  VISUALIZED ORBITS: Bilateral lens replacement.  CALVARIUM: Intact.    MISCELLANEOUS: None.      CT CERVICAL SPINE:    VERTEBRAE:  Normal in height. No acute fracture. Multilevel degenerative   changes including marginal osteophytes. Heterogeneous mineralization.  ALIGNMENT: No subluxation or scoliosis.  INTERVERTEBRAL DISC SPACES: No significant disc bulge or focal disc   herniation. No significant spinal canal or neural foraminal stenosis.    VISUALIZED LUNGS: Biapical scarring.    MISCELLANEOUS:  2.4 cm heterogeneous nodule in the right thyroid lobe.      IMPRESSION:    CT HEAD:  No acute intracranial hemorrhage, mass effect, or midline shift.  Suspected sinusitis.  Right-sided cochlear implant.    CT CERVICAL SPINE:  No acutefracture or traumatic subluxation.    Multi-level degenerative changes.        --- End of Report ---            JARRET PADGETT MD; Attending Radiologist  This document has been electronically signed. Mar 19 2025  2:48PM    < end of copied text >

## 2025-03-20 NOTE — CONSULT NOTE ADULT - ASSESSMENT
Patient is a 84 year old male with PMH of Parkinson's disease, BPH, HTN, HLD, DM, UTIs, from home who presents with lethargy. Since Saturday patient has been experiencing low grade fevers and non productive cough and yesterday had fever of 100.4F, was given Tylenol 650mg at 1020AM. Family states one of patients son's was sick with URI last week and was around patient at that time. Patient has been generally weak, usually ambulates with cane but has been requiring more assistance over the last few days. He reported to family that he fell last night, slid off the bed but denies head trauma. Was able to bear weight after fall and ambulate today however again with assistance.    Sepsis and acute hypoxic respiratory failure due to FluA with superimposed pneumonia   Urinary tract infection  - fevers, AMS with +fluA on admission, recent sick contact-son and resp sx  - CXR with patchy RLL opacity - atelectasis v pneumonia   - noted history of Parkinsons, possible aspiration vs CAP  - UA with pyuria, no bacteria noted   - CTH with suspected sinusitis, no acute hemorrhage/mass effect  - WBC wnl, on NC 2L    Recommendations:   Follow Bcx, Ucx, in process   Follow full RVP, in process   Continue tamiflu to complete 5d course   Continue ceftriaxone and azithromycin for now   Supplemental O2 as needed, wean as able  Monitor temps/WBC   Continue rest of care per primary team     Leona Santana M.D.  [facility]  Available on [employer] TEAMS - *PREFERRED*  [phone]  After 5pm on weekdays and all day on weekends - please call [phone]     Thank you for consulting us and involving us in the management of this patients case. In addition to reviewing history, imaging, documents, labs, microbiology, took into account antibiotic stewardship, local antibiogram and infection control strategies and potential transmission issues at time of treatment decision making process.    Patient is a 84 year old male with PMH of Parkinson's disease, BPH, HTN, HLD, DM, UTIs, from home who presents with lethargy. Since Saturday patient has been experiencing low grade fevers and non productive cough and yesterday had fever of 100.4F, was given Tylenol 650mg at 1020AM. Family states one of patients son's was sick with URI last week and was around patient at that time. Patient has been generally weak, usually ambulates with cane but has been requiring more assistance over the last few days. He reported to family that he fell last night, slid off the bed but denies head trauma. Was able to bear weight after fall and ambulate today however again with assistance.    Sepsis and acute hypoxic respiratory failure due to FluA with superimposed pneumonia   Urinary tract infection  - fevers, AMS with +fluA on admission, recent sick contact-son and resp sx  - CXR with patchy RLL opacity - atelectasis v pneumonia   - noted history of Parkinsons, possible aspiration vs CAP  - UA with pyuria, no bacteria noted   - CTH with suspected sinusitis, no acute hemorrhage/mass effect  - WBC wnl, on NC 2L    Recommendations:   Follow Bcx, Ucx, in process   Follow full RVP, in process   Continue tamiflu to complete 5d course   Continue ceftriaxone and azithromycin for now   Supplemental O2 as needed, wean as able  Monitor temps/WBC   Droplet isolation  Supportive care, aspiration precautions  Continue rest of care per primary team     Leona Santana M.D.  [facility]  Available on [employer] TEAMS - *PREFERRED*  [phone]  After 5pm on weekdays and all day on weekends - please call [phone]     Thank you for consulting us and involving us in the management of this patients case. In addition to reviewing history, imaging, documents, labs, microbiology, took into account antibiotic stewardship, local antibiogram and infection control strategies and potential transmission issues at time of treatment decision making process.

## 2025-03-20 NOTE — CONSULT NOTE ADULT - CONSULT REQUESTED DATE/TIME
20-Mar-2025 10:14
20-Mar-2025 10:38
20-Mar-2025 08:36
19-Mar-2025 16:58
19-Mar-2025 19:01
20-Mar-2025 15:03

## 2025-03-21 LAB
ANION GAP SERPL CALC-SCNC: 15 MMOL/L — SIGNIFICANT CHANGE UP (ref 5–17)
BUN SERPL-MCNC: 25 MG/DL — HIGH (ref 7–23)
CALCIUM SERPL-MCNC: 9.2 MG/DL — SIGNIFICANT CHANGE UP (ref 8.4–10.5)
CHLORIDE SERPL-SCNC: 101 MMOL/L — SIGNIFICANT CHANGE UP (ref 96–108)
CO2 SERPL-SCNC: 19 MMOL/L — LOW (ref 22–31)
CREAT SERPL-MCNC: 1.1 MG/DL — SIGNIFICANT CHANGE UP (ref 0.5–1.3)
CULTURE RESULTS: SIGNIFICANT CHANGE UP
EGFR: 65 ML/MIN/1.73M2 — SIGNIFICANT CHANGE UP
EGFR: 65 ML/MIN/1.73M2 — SIGNIFICANT CHANGE UP
GLUCOSE BLDC GLUCOMTR-MCNC: 102 MG/DL — HIGH (ref 70–99)
GLUCOSE BLDC GLUCOMTR-MCNC: 103 MG/DL — HIGH (ref 70–99)
GLUCOSE BLDC GLUCOMTR-MCNC: 107 MG/DL — HIGH (ref 70–99)
GLUCOSE BLDC GLUCOMTR-MCNC: 132 MG/DL — HIGH (ref 70–99)
GLUCOSE SERPL-MCNC: 88 MG/DL — SIGNIFICANT CHANGE UP (ref 70–99)
HCT VFR BLD CALC: 35.5 % — LOW (ref 39–50)
HGB BLD-MCNC: 11.9 G/DL — LOW (ref 13–17)
MCHC RBC-ENTMCNC: 30.8 PG — SIGNIFICANT CHANGE UP (ref 27–34)
MCHC RBC-ENTMCNC: 33.5 G/DL — SIGNIFICANT CHANGE UP (ref 32–36)
MCV RBC AUTO: 92 FL — SIGNIFICANT CHANGE UP (ref 80–100)
NRBC BLD AUTO-RTO: 0 /100 WBCS — SIGNIFICANT CHANGE UP (ref 0–0)
PLATELET # BLD AUTO: 174 K/UL — SIGNIFICANT CHANGE UP (ref 150–400)
POTASSIUM SERPL-MCNC: 4 MMOL/L — SIGNIFICANT CHANGE UP (ref 3.5–5.3)
POTASSIUM SERPL-SCNC: 4 MMOL/L — SIGNIFICANT CHANGE UP (ref 3.5–5.3)
RBC # BLD: 3.86 M/UL — LOW (ref 4.2–5.8)
RBC # FLD: 13.6 % — SIGNIFICANT CHANGE UP (ref 10.3–14.5)
SODIUM SERPL-SCNC: 135 MMOL/L — SIGNIFICANT CHANGE UP (ref 135–145)
SPECIMEN SOURCE: SIGNIFICANT CHANGE UP
WBC # BLD: 5.97 K/UL — SIGNIFICANT CHANGE UP (ref 3.8–10.5)
WBC # FLD AUTO: 5.97 K/UL — SIGNIFICANT CHANGE UP (ref 3.8–10.5)

## 2025-03-21 RX ADMIN — Medication 1 TABLET(S): at 05:16

## 2025-03-21 RX ADMIN — Medication 1 DOSE(S): at 21:05

## 2025-03-21 RX ADMIN — DORZOLAMIDE HYDROCHLORIDE AND TIMOLOL MALEATE 1 DROP(S): 20; 5 SOLUTION/ DROPS OPHTHALMIC at 17:32

## 2025-03-21 RX ADMIN — Medication 1 DROP(S): at 05:17

## 2025-03-21 RX ADMIN — LISINOPRIL 40 MILLIGRAM(S): 5 TABLET ORAL at 05:16

## 2025-03-21 RX ADMIN — Medication 250 MILLIGRAM(S): at 19:48

## 2025-03-21 RX ADMIN — GABAPENTIN 100 MILLIGRAM(S): 400 CAPSULE ORAL at 21:05

## 2025-03-21 RX ADMIN — Medication 1 DROP(S): at 21:06

## 2025-03-21 RX ADMIN — CEFTRIAXONE 100 MILLIGRAM(S): 500 INJECTION, POWDER, FOR SOLUTION INTRAMUSCULAR; INTRAVENOUS at 17:35

## 2025-03-21 RX ADMIN — IPRATROPIUM BROMIDE AND ALBUTEROL SULFATE 3 MILLILITER(S): .5; 2.5 SOLUTION RESPIRATORY (INHALATION) at 17:32

## 2025-03-21 RX ADMIN — DORZOLAMIDE HYDROCHLORIDE AND TIMOLOL MALEATE 1 DROP(S): 20; 5 SOLUTION/ DROPS OPHTHALMIC at 05:16

## 2025-03-21 RX ADMIN — IPRATROPIUM BROMIDE AND ALBUTEROL SULFATE 3 MILLILITER(S): .5; 2.5 SOLUTION RESPIRATORY (INHALATION) at 11:43

## 2025-03-21 RX ADMIN — GABAPENTIN 100 MILLIGRAM(S): 400 CAPSULE ORAL at 13:30

## 2025-03-21 RX ADMIN — OSELTAMIVIR PHOSPHATE 30 MILLIGRAM(S): 75 CAPSULE ORAL at 17:33

## 2025-03-21 RX ADMIN — Medication 150 MILLIGRAM(S): at 11:42

## 2025-03-21 RX ADMIN — ESCITALOPRAM OXALATE 5 MILLIGRAM(S): 20 TABLET ORAL at 11:42

## 2025-03-21 RX ADMIN — Medication 1 TABLET(S): at 11:42

## 2025-03-21 RX ADMIN — ENOXAPARIN SODIUM 40 MILLIGRAM(S): 100 INJECTION SUBCUTANEOUS at 17:32

## 2025-03-21 RX ADMIN — IPRATROPIUM BROMIDE AND ALBUTEROL SULFATE 3 MILLILITER(S): .5; 2.5 SOLUTION RESPIRATORY (INHALATION) at 23:05

## 2025-03-21 RX ADMIN — ATORVASTATIN CALCIUM 20 MILLIGRAM(S): 80 TABLET, FILM COATED ORAL at 21:05

## 2025-03-21 RX ADMIN — BRIMONIDINE TARTRATE 1 DROP(S): 1.5 SOLUTION/ DROPS OPHTHALMIC at 05:17

## 2025-03-21 RX ADMIN — OSELTAMIVIR PHOSPHATE 30 MILLIGRAM(S): 75 CAPSULE ORAL at 05:16

## 2025-03-21 RX ADMIN — GABAPENTIN 100 MILLIGRAM(S): 400 CAPSULE ORAL at 05:16

## 2025-03-21 RX ADMIN — Medication 1 TABLET(S): at 23:05

## 2025-03-21 RX ADMIN — Medication 1 TABLET(S): at 17:34

## 2025-03-21 RX ADMIN — BRIMONIDINE TARTRATE 1 DROP(S): 1.5 SOLUTION/ DROPS OPHTHALMIC at 17:33

## 2025-03-21 RX ADMIN — LATANOPROST PF 1 DROP(S): 0.05 SOLUTION/ DROPS OPHTHALMIC at 21:06

## 2025-03-21 RX ADMIN — TAMSULOSIN HYDROCHLORIDE 0.4 MILLIGRAM(S): 0.4 CAPSULE ORAL at 21:05

## 2025-03-21 RX ADMIN — IPRATROPIUM BROMIDE AND ALBUTEROL SULFATE 3 MILLILITER(S): .5; 2.5 SOLUTION RESPIRATORY (INHALATION) at 05:16

## 2025-03-21 NOTE — PROGRESS NOTE ADULT - ASSESSMENT
Patient is a 84 year old male with PMH of Parkinson's disease, BPH, HTN, HLD, DM, UTIs, from home who presents with lethargy. Since Saturday patient has been experiencing low grade fevers and non productive cough and yesterday had fever of 100.4F, was given Tylenol 650mg at 1020AM. Family states one of patients son's was sick with URI last week and was around patient at that time. Patient has been generally weak, usually ambulates with cane but has been requiring more assistance over the last few days. He reported to family that he fell last night, slid off the bed but denies head trauma. Was able to bear weight after fall and ambulate today however again with assistance.    Sepsis and acute hypoxic respiratory failure due to FluA with superimposed pneumonia   Urinary tract infection  - fevers, AMS with +fluA on admission, recent sick contact-son and resp sx  - CXR with patchy RLL opacity - atelectasis v pneumonia   - noted history of Parkinsons, possible aspiration vs CAP  - UA with pyuria, no bacteria noted   - CTH with suspected sinusitis, no acute hemorrhage/mass effect  - afebrile x24h, WBC wnl, now off NC and on RA    Recommendations:   Follow Bcx - NGTD x2    Follow Ucx, in process   Continue ceftriaxone 1g IV Q24h  Continue tamiflu to complete 5d course on 3/24  Continue azithromycin to complete 3d course today 3/21   Monitor temps/WBC   Droplet isolation  Supportive care  Aspiration precautions  Continue rest of care per primary team     Over the weekend Dr. Lilli Jacobson will be covering for our group. If you have any questions, concerns or new micro data please reach out to them using TEAMS *PREFERRED* or by calling our service at [phone].     Leona Santana M.D.  [facility]  Available on [employer] TEAMS - *PREFERRED*  [phone]  After 5pm on weekdays and all day on weekends - please call [phone]     Thank you for consulting us and involving us in the management of this patients case. In addition to reviewing history, imaging, documents, labs, microbiology, took into account antibiotic stewardship, local antibiogram and infection control strategies and potential transmission issues at time of treatment decision making process.

## 2025-03-21 NOTE — PROGRESS NOTE ADULT - ASSESSMENT
83 yo M with a PMH of Parkinson's disease, BPH , HTN, HLD, DM, UTIs, from home p/w lethargy. Since Saturday pt has been experiencing low grade fevers and non prod cough. Today tmax  of 100.4, was given Tylenol 650mg at 1020AM. Family states one of pts son's was sick with URI last week and was around pt at that time. Pt has been generally weak, usually ambulates with cane but has been requiring more assistance over the last few days. He reported to family that he fell last night, slid off the bed but denies head trauma. Was able to bear weight after fall and ambulate today however again with assistance. Denies nausea, vomiting, chest pain, SOB, abd pain, diarrhea, urinary complaints, headache, dizziness. Of note, family called pts PMD and informed them of sxs    Acute hypoxic respiratory failure sec to influenza A with superimposed pneumonia  - cw oxygen support  - fu cultures  - cw rocephin and azithromycin  - cw tamiflu  - will monitor     UTI  - cw rocephin  - fu cultures    Parkinson's disease  - cw sinemet  - neuro fu as outpt     DM  - monitor FS  - ISS    DVT prophylaxis

## 2025-03-21 NOTE — PROGRESS NOTE ADULT - ASSESSMENT
Flu A / PNA  abx  plan as per ID/pulm    HTN  cont current meds   monitor for O.H. given Parkinson   high risk of autonomic dysfunction     HLD  on statin     DM II  Monitor finger stick. Insulin coverage. Diabetic education and Diabetic diet. Consider nutrition consultation.      discussed with son

## 2025-03-21 NOTE — PROGRESS NOTE ADULT - ASSESSMENT
83 yo M with Parkinson's disease, BPH , HTN, HLD, DM, UTIs, from home p/w lethargy. Since Saturday pt has been experiencing low grade fevers and non prod cough. Today tmax  of 100.4, was given Tylenol 650mg at 1020AM. Family states one of pts son's was sick with URI last week and was around pt at that time. Pt has been generally weak, usually ambulates with cane but has been requiring more assistance over the last few days. He reported to family that he fell last night, slid off the bed but denies head trauma. Was able to bear weight after fall and ambulate today however again with assistance. Denies nausea, vomiting, chest pain, SOB, abd pain, diarrhea, urinary complaints, headache, dizziness. Of note, family called pts PMD and informed them of sxs    AAOx1-2, slurred a bit ,+ EPS and tremor B/L 2/2 PD, walker baseline   + flu   CTH neg expecpt suspcted sinusitis  CT C spine mulitlevel degnertive changes     Impression   1) AMS likely toxic metabolic encephalopthy from infection/flu/sinusitis   2) parkinsons dementia     - no change in home sinemet getting 25/100 4x/day  - treatment of infection on CTX and azitrhomycin now ; tamiflu  - infectous workup in progrss  - unclear if would be able to get MRI brain with cochlear implant. hold off for now.  CTH if change in exam  - check orthostatics   - PT/OT   - check FS, glucose control <180  - GI/DVT ppx   Thank you for allowing me to participate in the care of this patient. Call with questions.     Ky Goff MD  [facility]  Office: [phone]

## 2025-03-21 NOTE — PROGRESS NOTE ADULT - ASSESSMENT
83 yo M with a PMH of Parkinson's disease, BPH , HTN, HLD, DM, UTIs, from home p/w lethargy. Since Saturday pt has been experiencing low grade fevers and non prod cough. Today tmax  of 100.4, was given Tylenol 650mg at 1020AM. Family states one of pts son's was sick with URI last week and was around pt at that time. Pt has been generally weak, usually ambulates with cane but has been requiring more assistance over the last few days. He reported to family that he fell last night, slid off the bed but denies head trauma. Was able to bear weight after fall and ambulate today however again with assistance. Denies nausea, vomiting, chest pain, SOB, abd pain, diarrhea, urinary complaints, headache, dizziness. Of note, family called pts PMD and informed them of sxs (19 Mar 2025 17:03)  he is found to influenza:   spoke to his son : he never smoked:  he had had pneumonia before   5 years ago, :  not on any pumps and nebs:     Influenza/pneumonia  Parkinsosn disease  HTN  DM  HLD/BPH    Influenza/pneumonia  HAS HAD LOW GRADE TEMPERATURE AND ONE OF HIS RELATIVE WAS SICK AT HOME:  now he is found to have influenza:   cxr with subtle rll infiltrate:  not convincing for pneumonia to me   do ct chest non contrast    he is already on rx for influenza and CAP:   ct scan chest will clarify further :  VBG with no co2 retention ;    3/21:  ct chest showed: Mild/moderate biapical consolidative airspace opacities with focal bronchial dilatation, right greater than left. There is mild bilateral lower lobe airspace infiltrate and/or subsegmental atelectasis. No sara consolidation. Mild bilateral lower lobe bronchiectasis.Cholelithiasis. 1.3 cm gallstone region of gallbladder neck. No  pericholecystic inflammatory changes.  start advair  antibtiocs for five days total      Parkinsosn disease  carbidopa/levodopa  25/100 1 Tablet(s) Oral four times a day    HTN  lisinopril 40 milliGRAM(s) Oral daily    DM  cont to monitor and control    HLD/BPH  tamsulosin 0.4 milliGRAM(s) Oral at bedtime    ? depression  escitalopram 5 milliGRAM(s) Oral daily  traZODone 150 milliGRAM(s) Oral daily    dw acp  : needs repeat ct scan chest  in 6 weeks time after dc for follow up

## 2025-03-22 ENCOUNTER — TRANSCRIPTION ENCOUNTER (OUTPATIENT)
Age: 85
End: 2025-03-22

## 2025-03-22 LAB
GLUCOSE BLDC GLUCOMTR-MCNC: 110 MG/DL — HIGH (ref 70–99)
GLUCOSE BLDC GLUCOMTR-MCNC: 123 MG/DL — HIGH (ref 70–99)
GLUCOSE BLDC GLUCOMTR-MCNC: 97 MG/DL — SIGNIFICANT CHANGE UP (ref 70–99)
HCT VFR BLD CALC: 37 % — LOW (ref 39–50)
HGB BLD-MCNC: 12.3 G/DL — LOW (ref 13–17)
MCHC RBC-ENTMCNC: 30.7 PG — SIGNIFICANT CHANGE UP (ref 27–34)
MCHC RBC-ENTMCNC: 33.2 G/DL — SIGNIFICANT CHANGE UP (ref 32–36)
MCV RBC AUTO: 92.3 FL — SIGNIFICANT CHANGE UP (ref 80–100)
NRBC BLD AUTO-RTO: 0 /100 WBCS — SIGNIFICANT CHANGE UP (ref 0–0)
PLATELET # BLD AUTO: 178 K/UL — SIGNIFICANT CHANGE UP (ref 150–400)
RBC # BLD: 4.01 M/UL — LOW (ref 4.2–5.8)
RBC # FLD: 13.4 % — SIGNIFICANT CHANGE UP (ref 10.3–14.5)
WBC # BLD: 6.13 K/UL — SIGNIFICANT CHANGE UP (ref 3.8–10.5)
WBC # FLD AUTO: 6.13 K/UL — SIGNIFICANT CHANGE UP (ref 3.8–10.5)

## 2025-03-22 RX ADMIN — LATANOPROST PF 1 DROP(S): 0.05 SOLUTION/ DROPS OPHTHALMIC at 21:07

## 2025-03-22 RX ADMIN — LISINOPRIL 40 MILLIGRAM(S): 5 TABLET ORAL at 05:28

## 2025-03-22 RX ADMIN — DORZOLAMIDE HYDROCHLORIDE AND TIMOLOL MALEATE 1 DROP(S): 20; 5 SOLUTION/ DROPS OPHTHALMIC at 05:28

## 2025-03-22 RX ADMIN — IPRATROPIUM BROMIDE AND ALBUTEROL SULFATE 3 MILLILITER(S): .5; 2.5 SOLUTION RESPIRATORY (INHALATION) at 05:27

## 2025-03-22 RX ADMIN — TAMSULOSIN HYDROCHLORIDE 0.4 MILLIGRAM(S): 0.4 CAPSULE ORAL at 21:03

## 2025-03-22 RX ADMIN — OSELTAMIVIR PHOSPHATE 30 MILLIGRAM(S): 75 CAPSULE ORAL at 17:58

## 2025-03-22 RX ADMIN — Medication 1 DOSE(S): at 05:29

## 2025-03-22 RX ADMIN — GABAPENTIN 100 MILLIGRAM(S): 400 CAPSULE ORAL at 13:11

## 2025-03-22 RX ADMIN — Medication 1 TABLET(S): at 17:57

## 2025-03-22 RX ADMIN — Medication 1 TABLET(S): at 12:39

## 2025-03-22 RX ADMIN — Medication 1 DROP(S): at 21:07

## 2025-03-22 RX ADMIN — Medication 1 DOSE(S): at 17:59

## 2025-03-22 RX ADMIN — GABAPENTIN 100 MILLIGRAM(S): 400 CAPSULE ORAL at 05:28

## 2025-03-22 RX ADMIN — ESCITALOPRAM OXALATE 5 MILLIGRAM(S): 20 TABLET ORAL at 12:39

## 2025-03-22 RX ADMIN — Medication 1 DROP(S): at 13:26

## 2025-03-22 RX ADMIN — IPRATROPIUM BROMIDE AND ALBUTEROL SULFATE 3 MILLILITER(S): .5; 2.5 SOLUTION RESPIRATORY (INHALATION) at 17:58

## 2025-03-22 RX ADMIN — BRIMONIDINE TARTRATE 1 DROP(S): 1.5 SOLUTION/ DROPS OPHTHALMIC at 05:29

## 2025-03-22 RX ADMIN — Medication 1 TABLET(S): at 05:28

## 2025-03-22 RX ADMIN — Medication 150 MILLIGRAM(S): at 12:39

## 2025-03-22 RX ADMIN — Medication 1 DROP(S): at 05:28

## 2025-03-22 RX ADMIN — BRIMONIDINE TARTRATE 1 DROP(S): 1.5 SOLUTION/ DROPS OPHTHALMIC at 17:59

## 2025-03-22 RX ADMIN — CEFTRIAXONE 100 MILLIGRAM(S): 500 INJECTION, POWDER, FOR SOLUTION INTRAMUSCULAR; INTRAVENOUS at 17:58

## 2025-03-22 RX ADMIN — ENOXAPARIN SODIUM 40 MILLIGRAM(S): 100 INJECTION SUBCUTANEOUS at 17:58

## 2025-03-22 RX ADMIN — GABAPENTIN 100 MILLIGRAM(S): 400 CAPSULE ORAL at 21:02

## 2025-03-22 RX ADMIN — IPRATROPIUM BROMIDE AND ALBUTEROL SULFATE 3 MILLILITER(S): .5; 2.5 SOLUTION RESPIRATORY (INHALATION) at 23:09

## 2025-03-22 RX ADMIN — IPRATROPIUM BROMIDE AND ALBUTEROL SULFATE 3 MILLILITER(S): .5; 2.5 SOLUTION RESPIRATORY (INHALATION) at 12:40

## 2025-03-22 RX ADMIN — Medication 1 TABLET(S): at 23:16

## 2025-03-22 RX ADMIN — ATORVASTATIN CALCIUM 20 MILLIGRAM(S): 80 TABLET, FILM COATED ORAL at 21:03

## 2025-03-22 RX ADMIN — DORZOLAMIDE HYDROCHLORIDE AND TIMOLOL MALEATE 1 DROP(S): 20; 5 SOLUTION/ DROPS OPHTHALMIC at 18:35

## 2025-03-22 RX ADMIN — OSELTAMIVIR PHOSPHATE 30 MILLIGRAM(S): 75 CAPSULE ORAL at 05:28

## 2025-03-22 NOTE — DISCHARGE NOTE PROVIDER - NSDCCPCAREPLAN_GEN_ALL_CORE_FT
PRINCIPAL DISCHARGE DIAGNOSIS  Diagnosis: Hypoxia  Assessment and Plan of Treatment:       SECONDARY DISCHARGE DIAGNOSES  Diagnosis: Influenza A  Assessment and Plan of Treatment:     Diagnosis: Fall at home  Assessment and Plan of Treatment:      PRINCIPAL DISCHARGE DIAGNOSIS  Diagnosis: Influenza A  Assessment and Plan of Treatment: You tested positive for Influenza A.  You no longer require hospitalization. Cover your cough and sneezes.  Clean your hands often.  Avoid sharing personal household items.  Clean all frequently touched surfaces daily. Please finish a course of oral antibiotic levaquin to complete a total 7-day course. Please start advair and nebulizer as recommended by pulmonology. You will require a repeat CT scan of the chest to ensure influenza pneumonia has resolved. Please follow-up with your primary care physician within one week of discharge.      SECONDARY DISCHARGE DIAGNOSES  Diagnosis: Fall at home  Assessment and Plan of Treatment: Your risk of falling increases as you grow older. That's because getting older can make it harder to walk steadily and keep your balance. Also, the effects of falls are more serious in older people. Several things can increase your risk of a fall such as illness, change in the medicines you take, unsafe or unfamiliar setting (for example, a room with rugs or furniture that might trip you, or an area you don't know well). It is important to tell your doctor about any times you have fallen or almost fallen. He or she can then suggest ways to prevent another fall. Some health problems can put you at risk of falling. These include conditions that affect eyesight, hearing, muscle strength, or balance. Certain medicines can increase the risk of falling. These include some medicines that are used for sleeping problems, anxiety, or depression. Adding new medicines, or changing doses of some medicines, can also affect your risk of falling. Your doctor might switch you to a different medicine. Prevent falls by make your home safer – To avoid falling at home, get rid of things that might make you trip or slip. This might include furniture, electrical cords, clutter, and loose rugs. Keep your home well lit so that you can easily see where you are going. Avoid storing things in high places so you don't have to reach or climb. Wear sturdy shoes that fit well – Wearing shoes with high heels or slippery soles, or shoes that are too loose, can lead to falls. Take vitamin D pills which might lower the risk of falls in older people. This is because vitamin D helps make bones and muscles stronger. Use a cane, walker, and other safety devices as these devices help you avoid falling, too. These include grab bars or a sturdy seat for the shower, non-slip bath mats, and hand rails or treads for the stairs (to prevent slipping). If you worry that you could fall, there are also alarm buttons that let you call for help if you fall and can't get up.     PRINCIPAL DISCHARGE DIAGNOSIS  Diagnosis: Influenza A  Assessment and Plan of Treatment: You tested positive for Influenza A.  You no longer require hospitalization. Cover your cough and sneezes.  Clean your hands often.  Avoid sharing personal household items.  Clean all frequently touched surfaces daily. completed antibiotics. Please start advair and nebulizer as recommended by pulmonology. You will require a repeat CT scan of the chest to ensure influenza pneumonia has resolved. Please follow-up with your primary care physician within one week of discharge.      SECONDARY DISCHARGE DIAGNOSES  Diagnosis: Fall at home  Assessment and Plan of Treatment: Your risk of falling increases as you grow older. That's because getting older can make it harder to walk steadily and keep your balance. Also, the effects of falls are more serious in older people. Several things can increase your risk of a fall such as illness, change in the medicines you take, unsafe or unfamiliar setting (for example, a room with rugs or furniture that might trip you, or an area you don't know well). It is important to tell your doctor about any times you have fallen or almost fallen. He or she can then suggest ways to prevent another fall. Some health problems can put you at risk of falling. These include conditions that affect eyesight, hearing, muscle strength, or balance. Certain medicines can increase the risk of falling. These include some medicines that are used for sleeping problems, anxiety, or depression. Adding new medicines, or changing doses of some medicines, can also affect your risk of falling. Your doctor might switch you to a different medicine. Prevent falls by make your home safer – To avoid falling at home, get rid of things that might make you trip or slip. This might include furniture, electrical cords, clutter, and loose rugs. Keep your home well lit so that you can easily see where you are going. Avoid storing things in high places so you don't have to reach or climb. Wear sturdy shoes that fit well – Wearing shoes with high heels or slippery soles, or shoes that are too loose, can lead to falls. Take vitamin D pills which might lower the risk of falls in older people. This is because vitamin D helps make bones and muscles stronger. Use a cane, walker, and other safety devices as these devices help you avoid falling, too. These include grab bars or a sturdy seat for the shower, non-slip bath mats, and hand rails or treads for the stairs (to prevent slipping). If you worry that you could fall, there are also alarm buttons that let you call for help if you fall and can't get up.    Diagnosis: Parkinsons disease  Assessment and Plan of Treatment: continue with sinemet as prescribed

## 2025-03-22 NOTE — DISCHARGE NOTE PROVIDER - NSDCMRMEDTOKEN_GEN_ALL_CORE_FT
Artificial Tears ophthalmic solution: 1 drop(s) in each eye 3 times a day as needed for  dry eyes  brimonidine 0.2% ophthalmic solution: 1 drop(s) in each affected eye 2 times a day  carbidopa-levodopa 25 mg-100 mg oral tablet: 1.5 tab(s) orally 2 times a day 9am and 12pm  dorzolamide-timolol 2.23%-0.68% (2%-0.5% base) ophthalmic solution: 1 drop(s) in each eye 2 times a day  escitalopram 5 mg oral tablet: 1 tab(s) orally once a day  Flomax 0.4 mg oral capsule: 1 cap(s) orally once a day  gabapentin 100 mg oral capsule: 1 cap(s) orally 3 times a day  latanoprost 0.005% ophthalmic solution: 1 drop(s) in each eye once a day (at bedtime)  metFORMIN 500 mg oral tablet: 1 tab(s) orally once a day (in the morning)  Sinemet 25 mg-100 mg oral tablet: 1 tab(s) orally 2 times a day at 5pm and 9pm  traZODone 150 mg oral tablet: 1 tab(s) orally once a day  Zestril 40 mg oral tablet: 1 tab(s) orally once a day  Zocor 40 mg oral tablet: 1 tab(s) orally once a day (at bedtime)   Artificial Tears ophthalmic solution: 1 drop(s) in each eye 3 times a day as needed for  dry eyes  brimonidine 0.2% ophthalmic solution: 1 drop(s) in each affected eye 2 times a day  carbidopa-levodopa 25 mg-100 mg oral tablet: 1.5 tab(s) orally 2 times a day 9am and 12pm  dorzolamide-timolol 2.23%-0.68% (2%-0.5% base) ophthalmic solution: 1 drop(s) in each eye 2 times a day  escitalopram 5 mg oral tablet: 1 tab(s) orally once a day  Flomax 0.4 mg oral capsule: 1 cap(s) orally once a day  fluticasone-salmeterol 250 mcg-50 mcg/inh inhalation powder: 1 puff(s) inhaled 2 times a day  gabapentin 100 mg oral capsule: 1 cap(s) orally 3 times a day  ipratropium-albuterol 0.5 mg-2.5 mg/3 mL inhalation solution: 3 milliliter(s) inhaled every 6 hours  latanoprost 0.005% ophthalmic solution: 1 drop(s) in each eye once a day (at bedtime)  metFORMIN 500 mg oral tablet: 1 tab(s) orally once a day (in the morning)  Sinemet 25 mg-100 mg oral tablet: 1 tab(s) orally 2 times a day at 5pm and 9pm  traZODone 150 mg oral tablet: 1 tab(s) orally once a day  Zestril 40 mg oral tablet: 1 tab(s) orally once a day  Zocor 40 mg oral tablet: 1 tab(s) orally once a day (at bedtime)

## 2025-03-22 NOTE — DISCHARGE NOTE PROVIDER - CARE PROVIDERS DIRECT ADDRESSES
,[email or web address],DirectAddress_Unknown [email or web address],DirectAddress_Unknown,DirectAddress_Unknown

## 2025-03-22 NOTE — PROGRESS NOTE ADULT - ASSESSMENT
85 yo M with a PMH of Parkinson's disease, BPH , HTN, HLD, DM, UTIs, from home p/w lethargy. Since Saturday pt has been experiencing low grade fevers and non prod cough. Today tmax  of 100.4, was given Tylenol 650mg at 1020AM. Family states one of pts son's was sick with URI last week and was around pt at that time. Pt has been generally weak, usually ambulates with cane but has been requiring more assistance over the last few days. He reported to family that he fell last night, slid off the bed but denies head trauma. Was able to bear weight after fall and ambulate today however again with assistance. Denies nausea, vomiting, chest pain, SOB, abd pain, diarrhea, urinary complaints, headache, dizziness. Of note, family called pts PMD and informed them of sxs (19 Mar 2025 17:03)  he is found to influenza:   spoke to his son : he never smoked:  he had had pneumonia before   5 years ago, :  not on any pumps and nebs:     Influenza/pneumonia  Parkinsosn disease  HTN  DM  HLD/BPH    Influenza/pneumonia  HAS HAD LOW GRADE TEMPERATURE AND ONE OF HIS RELATIVE WAS SICK AT HOME:  now he is found to have influenza:   cxr with subtle rll infiltrate:  not convincing for pneumonia to me   do ct chest non contrast    he is already on rx for influenza and CAP:   ct scan chest will clarify further :  VBG with no co2 retention ;    3/21:  ct chest showed: Mild/moderate biapical consolidative airspace opacities with focal bronchial dilatation, right greater than left. There is mild bilateral lower lobe airspace infiltrate and/or subsegmental atelectasis. No sara consolidation. Mild bilateral lower lobe bronchiectasis.Cholelithiasis. 1.3 cm gallstone region of gallbladder neck. No  pericholecystic inflammatory changes.  start advair  antibtiocs for five days total      Parkinsosn disease  carbidopa/levodopa  25/100 1 Tablet(s) Oral four times a day    HTN  lisinopril 40 milliGRAM(s) Oral daily    DM  cont to monitor and control    HLD/BPH  tamsulosin 0.4 milliGRAM(s) Oral at bedtime    ? depression  escitalopram 5 milliGRAM(s) Oral daily  traZODone 150 milliGRAM(s) Oral daily    dw acp  : needs repeat ct scan chest  in 6 weeks time after dc for follow up

## 2025-03-22 NOTE — DISCHARGE NOTE PROVIDER - HOSPITAL COURSE
HPI:   85 yo M with a PMH of Parkinson's disease, BPH , HTN, HLD, DM, UTIs, from home p/w lethargy. Since Saturday pt has been experiencing low grade fevers and non prod cough. Today tmax  of 100.4, was given Tylenol 650mg at 1020AM. Family states one of pts son's was sick with URI last week and was around pt at that time. Pt has been generally weak, usually ambulates with cane but has been requiring more assistance over the last few days. He reported to family that he fell last night, slid off the bed but denies head trauma. Was able to bear weight after fall and ambulate today however again with assistance. Denies nausea, vomiting, chest pain, SOB, abd pain, diarrhea, urinary complaints, headache, dizziness. Of note, family called pts PMD and informed them of sxs (19 Mar 2025 17:03)    Hospital Course:      Important Medication Changes and Reason:      Active or Pending Issues Requiring Follow-up:  - PCP  - Pulmonology, repeat CT scan in 6 weeks    Advanced Directives:   [X] Full code  [ ] DNR  [ ] Hospice    Discharge Diagnoses:  Flu A PNA  UTI  Fall  Mild EREN  Parkinsons       HPI:   83 yo M with a PMH of Parkinson's disease, BPH , HTN, HLD, DM, UTIs, from home p/w lethargy. Since Saturday pt has been experiencing low grade fevers and non prod cough. Today tmax  of 100.4, was given Tylenol 650mg at 1020AM. Family states one of pts son's was sick with URI last week and was around pt at that time. Pt has been generally weak, usually ambulates with cane but has been requiring more assistance over the last few days. He reported to family that he fell last night, slid off the bed but denies head trauma. Was able to bear weight after fall and ambulate today however again with assistance. Denies nausea, vomiting, chest pain, SOB, abd pain, diarrhea, urinary complaints, headache, dizziness. Of note, family called pts PMD and informed them of sxs (19 Mar 2025 17:03)    Hospital Course:      Discharge planning discussed with attending Dr Oneal. Patient is medically cleared and stable for discharge home with aides reinstated. Medication Reconciliation reviewed with attending.    Important Medication Changes and Reason:  - Levaquin x 4 days to finish  - Advair  - Duonebs    Active or Pending Issues Requiring Follow-up:  - PCP  - Pulmonology, repeat CT scan in 6 weeks    Advanced Directives:   [X] Full code  [ ] DNR  [ ] Hospice    Discharge Diagnoses:  Flu A PNA  UTI  Fall  Mild EREN  Parkinsons       HPI:   83 yo M with a PMH of Parkinson's disease, BPH , HTN, HLD, DM, UTIs, from home p/w lethargy. Since Saturday pt has been experiencing low grade fevers and non prod cough. Today tmax  of 100.4, was given Tylenol 650mg at 1020AM. Family states one of pts son's was sick with URI last week and was around pt at that time. Pt has been generally weak, usually ambulates with cane but has been requiring more assistance over the last few days. He reported to family that he fell last night, slid off the bed but denies head trauma. Was able to bear weight after fall and ambulate today however again with assistance. Denies nausea, vomiting, chest pain, SOB, abd pain, diarrhea, urinary complaints, headache, dizziness. Of note, family called pts PMD and informed them of sxs (19 Mar 2025 17:03)    Hospital Course:  Pt presented with Acute hypoxic respiratory failure sec to influenza A with superimposed pneumonia seen on CXR. Stalbe on RA. Pt evaled by pulm and ordered for tamiflu, duonebs and pt completed course of antibiotics. Pt UA pos for UTI, s/p abs. Pt had fall at home prior to admission, CTH, CT spine and pelvis xray neg for fx. Pt rec [facility] w/ [facility]. Pts labs noted for EREN, stable. Pt evaled by neuro for hx of parkinsons, rec to c/w sinemet as prescribed. Pt to f/u PCP as outpt.     Important Medication Changes and Reason:  - see med rec    Active or Pending Issues Requiring Follow-up:  - PCP, neurology, pulmonary     Advanced Directives:   [X] Full code  [ ] DNR  [ ] Hospice    Discharge Diagnoses:  - AHRF 2/2 PNA  - Flu A  - UTI  - s/p fall at home  - EREN      Discharge/Dispo/Med rec discussed with attending Dr. Oneal. Patient medically cleared for discharge Home PT w/ [facility] with outpatient follow up with PCP, neuro and pulmonary

## 2025-03-22 NOTE — PROGRESS NOTE ADULT - ASSESSMENT
Patient is a 84 year old male with PMH of Parkinson's disease, BPH, HTN, HLD, DM, UTIs, from home who presents with lethargy. Since Saturday patient has been experiencing low grade fevers and non productive cough and yesterday had fever of 100.4F, was given Tylenol 650mg at 1020AM. Family states one of patients son's was sick with URI last week and was around patient at that time. Patient has been generally weak, usually ambulates with cane but has been requiring more assistance over the last few days. He reported to family that he fell last night, slid off the bed but denies head trauma. Was able to bear weight after fall and ambulate today however again with assistance.    Sepsis and acute hypoxic respiratory failure due to FluA with superimposed pneumonia   Urinary tract infection  - fevers, AMS with +fluA on admission, recent sick contact-son and resp sx  - CXR with patchy RLL opacity - atelectasis v pneumonia   - noted history of Parkinsons, possible aspiration vs CAP  - UA with pyuria, no bacteria noted   - CTH with suspected sinusitis, no acute hemorrhage/mass effect  - afebrile x24h, WBC wnl, now off NC and on RA    3/19 Bcx - NGTD x2    3/19 Ucx   Normal Urogenital pita present    S/p azithromycin 3/19-3/21    Recommendations:   Continue ceftriaxone 1g IV Q24h to complete 5d course until 3/24  Continue tamiflu to complete 5d course on 3/24  Monitor temps/WBC   Droplet isolation  Supportive care  Aspiration precautions  Continue rest of care per primary team     Patient evaluated with face-to-face time in addition to reviewing history, labs, microbiology, and imaging.   Antibiotic stewardship, local antibiogram, infection control strategies and potential transmission issues taken into consideration at time of treatment decision making process.   Thank you for allowing us to participate in the care of your patient.  Infectious Diseases will follow. Please call with any questions.  Lilli Jacobson M.D.  Available on [employer] TEAMS -- *[facility]*  [facility] [phone]  For after 5 P.M. and weekends, please call [phone]

## 2025-03-22 NOTE — DISCHARGE NOTE PROVIDER - CARE PROVIDER_API CALL
JERAMIE ARREAGA  [address] [place] [address]  Phone: [phone]  Fax: [phone]  Established Patient  Follow Up Time: 1 week    Danie Rosales  Pulmonary Disease  [address] [place] [address]  Phone: [phone]  Fax: [phone]  Follow Up Time: 1 month   JERAMIE ARREAGA  [address] [place] [address]  Phone: [phone]  Fax: [phone]  Established Patient  Follow Up Time: 1 week    Danie Rosales  Pulmonary Disease  [address] [place] [address]  Phone: [phone]  Fax: [phone]  Follow Up Time: 1 month    Ky Goff  Neurology  [address], [address]  [place] [address]  Phone: [phone]  Fax: [phone]  Follow Up Time:

## 2025-03-22 NOTE — DISCHARGE NOTE PROVIDER - PROVIDER TOKENS
PROVIDER:[TOKEN:[[id number]],FOLLOWUP:[1 week],ESTABLISHEDPATIENT:[T]],PROVIDER:[TOKEN:[[id number]],FOLLOWUP:[1 month]] PROVIDER:[TOKEN:[[id number]],FOLLOWUP:[1 week],ESTABLISHEDPATIENT:[T]],PROVIDER:[TOKEN:[[id number]],FOLLOWUP:[1 month]],PROVIDER:[TOKEN:[[id number]]]

## 2025-03-22 NOTE — DISCHARGE NOTE PROVIDER - NSDCFUADDAPPT_GEN_ALL_CORE_FT
APPTS ARE READY TO BE MADE: [X] YES    Best Family or Patient Contact (if needed):    Additional Information about above appointments (if needed):    1:   2:   3:     Other comments or requests:    APPTS ARE READY TO BE MADE: [X] YES    Best Family or Patient Contact (if needed):    Additional Information about above appointments (if needed):    1: PCP  2: HOME Pulmonary  3: Neurology     Other comments or requests:    APPTS ARE READY TO BE MADE: [X] YES    Best Family or Patient Contact (if needed):    Additional Information about above appointments (if needed):    1: PCP  2: HOME Pulmonary  3: Neurology     Other comments or requests:     Pulmonary - Home Program:  Appointment was scheduled in JEFFREY Turpin MD 03/26/2025 04:30 PM    Pulmonary Disease:  Appointment was scheduled in Breonna Matt MD 4/01/2025 02:45 PM  [address] [place] [address] [phone]    Internal Medicine:  Patient informed us they already have secured a follow up appointment which was not scheduled by our team.  JERAMIE Simms 03/26/2025    Neurology:  Provided patient with provider referral information, however patient prefers to schedule the appointments on their own. Spoke with daughter Lady, she will schedule with patients existing provider.

## 2025-03-22 NOTE — PROGRESS NOTE ADULT - ASSESSMENT
83 yo M with a PMH of Parkinson's disease, BPH , HTN, HLD, DM, UTIs, from home p/w lethargy. Since Saturday pt has been experiencing low grade fevers and non prod cough. Today tmax  of 100.4, was given Tylenol 650mg at 1020AM. Family states one of pts son's was sick with URI last week and was around pt at that time. Pt has been generally weak, usually ambulates with cane but has been requiring more assistance over the last few days. He reported to family that he fell last night, slid off the bed but denies head trauma. Was able to bear weight after fall and ambulate today however again with assistance. Denies nausea, vomiting, chest pain, SOB, abd pain, diarrhea, urinary complaints, headache, dizziness. Of note, family called pts PMD and informed them of sxs    Acute hypoxic respiratory failure sec to influenza A with superimposed pneumonia  - cw oxygen support  - fu cultures  - cw rocephin and azithromycin  - cw tamiflu  - will monitor     UTI  - cw rocephin  - fu cultures    Parkinson's disease  - cw sinemet  - neuro fu as outpt     DM  - monitor FS  - ISS    DVT prophylaxis       dc planning

## 2025-03-23 LAB
GLUCOSE BLDC GLUCOMTR-MCNC: 101 MG/DL — HIGH (ref 70–99)
GLUCOSE BLDC GLUCOMTR-MCNC: 116 MG/DL — HIGH (ref 70–99)
GLUCOSE BLDC GLUCOMTR-MCNC: 137 MG/DL — HIGH (ref 70–99)
GLUCOSE BLDC GLUCOMTR-MCNC: 158 MG/DL — HIGH (ref 70–99)

## 2025-03-23 RX ADMIN — IPRATROPIUM BROMIDE AND ALBUTEROL SULFATE 3 MILLILITER(S): .5; 2.5 SOLUTION RESPIRATORY (INHALATION) at 05:19

## 2025-03-23 RX ADMIN — OSELTAMIVIR PHOSPHATE 30 MILLIGRAM(S): 75 CAPSULE ORAL at 05:19

## 2025-03-23 RX ADMIN — GABAPENTIN 100 MILLIGRAM(S): 400 CAPSULE ORAL at 05:19

## 2025-03-23 RX ADMIN — GABAPENTIN 100 MILLIGRAM(S): 400 CAPSULE ORAL at 23:01

## 2025-03-23 RX ADMIN — ATORVASTATIN CALCIUM 20 MILLIGRAM(S): 80 TABLET, FILM COATED ORAL at 23:01

## 2025-03-23 RX ADMIN — Medication 1 DROP(S): at 05:20

## 2025-03-23 RX ADMIN — IPRATROPIUM BROMIDE AND ALBUTEROL SULFATE 3 MILLILITER(S): .5; 2.5 SOLUTION RESPIRATORY (INHALATION) at 23:00

## 2025-03-23 RX ADMIN — Medication 1 TABLET(S): at 12:21

## 2025-03-23 RX ADMIN — Medication 1 TABLET(S): at 23:01

## 2025-03-23 RX ADMIN — IPRATROPIUM BROMIDE AND ALBUTEROL SULFATE 3 MILLILITER(S): .5; 2.5 SOLUTION RESPIRATORY (INHALATION) at 12:21

## 2025-03-23 RX ADMIN — CEFTRIAXONE 100 MILLIGRAM(S): 500 INJECTION, POWDER, FOR SOLUTION INTRAMUSCULAR; INTRAVENOUS at 17:31

## 2025-03-23 RX ADMIN — OSELTAMIVIR PHOSPHATE 30 MILLIGRAM(S): 75 CAPSULE ORAL at 17:26

## 2025-03-23 RX ADMIN — Medication 1 DOSE(S): at 05:20

## 2025-03-23 RX ADMIN — IPRATROPIUM BROMIDE AND ALBUTEROL SULFATE 3 MILLILITER(S): .5; 2.5 SOLUTION RESPIRATORY (INHALATION) at 17:34

## 2025-03-23 RX ADMIN — DORZOLAMIDE HYDROCHLORIDE AND TIMOLOL MALEATE 1 DROP(S): 20; 5 SOLUTION/ DROPS OPHTHALMIC at 05:20

## 2025-03-23 RX ADMIN — Medication 1 TABLET(S): at 17:26

## 2025-03-23 RX ADMIN — ENOXAPARIN SODIUM 40 MILLIGRAM(S): 100 INJECTION SUBCUTANEOUS at 17:27

## 2025-03-23 RX ADMIN — GABAPENTIN 100 MILLIGRAM(S): 400 CAPSULE ORAL at 12:22

## 2025-03-23 RX ADMIN — LISINOPRIL 40 MILLIGRAM(S): 5 TABLET ORAL at 05:19

## 2025-03-23 RX ADMIN — TAMSULOSIN HYDROCHLORIDE 0.4 MILLIGRAM(S): 0.4 CAPSULE ORAL at 23:01

## 2025-03-23 RX ADMIN — Medication 150 MILLIGRAM(S): at 12:21

## 2025-03-23 RX ADMIN — BRIMONIDINE TARTRATE 1 DROP(S): 1.5 SOLUTION/ DROPS OPHTHALMIC at 17:24

## 2025-03-23 RX ADMIN — Medication 1 DROP(S): at 23:00

## 2025-03-23 RX ADMIN — Medication 1 DOSE(S): at 17:24

## 2025-03-23 RX ADMIN — LATANOPROST PF 1 DROP(S): 0.05 SOLUTION/ DROPS OPHTHALMIC at 23:14

## 2025-03-23 RX ADMIN — Medication 1 TABLET(S): at 05:19

## 2025-03-23 RX ADMIN — Medication 1 DROP(S): at 12:25

## 2025-03-23 RX ADMIN — ESCITALOPRAM OXALATE 5 MILLIGRAM(S): 20 TABLET ORAL at 12:21

## 2025-03-23 RX ADMIN — DORZOLAMIDE HYDROCHLORIDE AND TIMOLOL MALEATE 1 DROP(S): 20; 5 SOLUTION/ DROPS OPHTHALMIC at 17:25

## 2025-03-23 RX ADMIN — BRIMONIDINE TARTRATE 1 DROP(S): 1.5 SOLUTION/ DROPS OPHTHALMIC at 05:20

## 2025-03-23 RX ADMIN — INSULIN LISPRO 1: 100 INJECTION, SOLUTION INTRAVENOUS; SUBCUTANEOUS at 12:17

## 2025-03-23 NOTE — PHYSICAL THERAPY INITIAL EVALUATION ADULT - GENERAL OBSERVATIONS, REHAB EVAL
Received pt bedside on unit. Montenegrin video translation utilized. /61, HR 69, O2 sats 95% on room air.

## 2025-03-23 NOTE — PROGRESS NOTE ADULT - ASSESSMENT
Flu A / PNA  abx  plan as per ID/pulm    HTN  cont current meds   monitor for O.H. given Parkinson   high risk of autonomic dysfunction     HLD  on statin     DM II  Monitor finger stick. Insulin coverage. Diabetic education and Diabetic diet. Consider nutrition consultation.      discussed with son  Flu A / PNA  abx  plan as per ID/pulm    HTN  cont current meds   monitor for O.H. given Parkinson   high risk of autonomic dysfunction     HLD  on statin     DM II  Monitor finger stick. Insulin coverage. Diabetic education and Diabetic diet. Consider nutrition consultation.

## 2025-03-23 NOTE — PHYSICAL THERAPY INITIAL EVALUATION ADULT - PLANNED THERAPY INTERVENTIONS, PT EVAL
stair training- GOAL: 8 stairs with rail and supervision, 2 wks/balance training/gait training/transfer training

## 2025-03-23 NOTE — PHYSICAL THERAPY INITIAL EVALUATION ADULT - PERTINENT HX OF CURRENT PROBLEM, REHAB EVAL
84 y/oM admitted 3/19 p/w lethargy. Found to have influenza A with superimposed pna. As per H&P, pt had 5 days of low grade fevers and non prod cough. On day of admission, tmax of 100.4, was given Tylenol 650mg at 1020AM. Family states one of pts son's was sick with URI last week and was around pt at that time. Pt has been generally weak, usually ambulates with cane but has been requiring more assistance over the last few days. He reported to family that he fell night before admission, slid off the bed but denies head trauma. Was able to bear weight after fall and ambulate next day, however again with assistance. PMH Parkinson's disease, BPH , HTN, HLD, DM, UTIs

## 2025-03-23 NOTE — PROGRESS NOTE ADULT - ASSESSMENT
85 yo M with Parkinson's disease, BPH , HTN, HLD, DM, UTIs, from home p/w lethargy. Since Saturday pt has been experiencing low grade fevers and non prod cough. Today tmax  of 100.4, was given Tylenol 650mg at 1020AM. Family states one of pts son's was sick with URI last week and was around pt at that time. Pt has been generally weak, usually ambulates with cane but has been requiring more assistance over the last few days. He reported to family that he fell last night, slid off the bed but denies head trauma. Was able to bear weight after fall and ambulate today however again with assistance. Denies nausea, vomiting, chest pain, SOB, abd pain, diarrhea, urinary complaints, headache, dizziness. Of note, family called pts PMD and informed them of sxs    AAOx1-2, slurred a bit ,+ EPS and tremor B/L 2/2 PD, walker baseline   + flu   CTH neg expecpt suspcted sinusitis  CT C spine mulitlevel degnertive changes     Impression   1) AMS likely toxic metabolic encephalopthy from infection/flu/sinusitis   2) parkinsons dementia     - no change in home sinemet getting 25/100 4x/day  - treatment of infection on CTX and azitrhomycin now ; tamiflu  - unclear if would be able to get MRI brain with cochlear implant. hold off for now.  CTH if change in exam  - check orthostatics   - PT/OT   - check FS, glucose control <180  - GI/DVT ppx   Thank you for allowing me to participate in the care of this patient. Call with questions.   lisandra Goff MD  Vascular Neurology  Office: [phone]

## 2025-03-23 NOTE — PROGRESS NOTE ADULT - ASSESSMENT
83 yo M with a PMH of Parkinson's disease, BPH , HTN, HLD, DM, UTIs, from home p/w lethargy. Since Saturday pt has been experiencing low grade fevers and non prod cough. Today tmax  of 100.4, was given Tylenol 650mg at 1020AM. Family states one of pts son's was sick with URI last week and was around pt at that time. Pt has been generally weak, usually ambulates with cane but has been requiring more assistance over the last few days. He reported to family that he fell last night, slid off the bed but denies head trauma. Was able to bear weight after fall and ambulate today however again with assistance. Denies nausea, vomiting, chest pain, SOB, abd pain, diarrhea, urinary complaints, headache, dizziness. Of note, family called pts PMD and informed them of sxs    Acute hypoxic respiratory failure sec to influenza A with superimposed pneumonia  - cw oxygen support  - fu cultures  - cw rocephin and azithromycin  - cw tamiflu  - will monitor     UTI  - cw rocephin  - fu cultures    Parkinson's disease  - cw sinemet  - neuro fu as outpt     DM  - monitor FS  - ISS    DVT prophylaxis       Medically stable for dc pending resolution of dispo issues

## 2025-03-23 NOTE — PHYSICAL THERAPY INITIAL EVALUATION ADULT - ADDITIONAL COMMENTS
Pt lives in basement apartment of private home with son, 8 stairs to enter. Uses a walker/transport chair for mobility. Pt lives in basement apartment of private home with son, 8 stairs to enter. Ambulates with rolling walker independently. Has HHA 5 hrs/day, 7 days/week, family present at other times. Owns rolling walker, wheelchair, shower chair.

## 2025-03-23 NOTE — PROGRESS NOTE ADULT - ASSESSMENT
83 yo M with a PMH of Parkinson's disease, BPH , HTN, HLD, DM, UTIs, from home p/w lethargy. Since Saturday pt has been experiencing low grade fevers and non prod cough. Today tmax  of 100.4, was given Tylenol 650mg at 1020AM. Family states one of pts son's was sick with URI last week and was around pt at that time. Pt has been generally weak, usually ambulates with cane but has been requiring more assistance over the last few days. He reported to family that he fell last night, slid off the bed but denies head trauma. Was able to bear weight after fall and ambulate today however again with assistance. Denies nausea, vomiting, chest pain, SOB, abd pain, diarrhea, urinary complaints, headache, dizziness. Of note, family called pts PMD and informed them of sxs (19 Mar 2025 17:03)  he is found to influenza:   spoke to his son : he never smoked:  he had had pneumonia before   5 years ago, :  not on any pumps and nebs:     Influenza/pneumonia  Parkinsosn disease  HTN  DM  HLD/BPH    Influenza/pneumonia  HAS HAD LOW GRADE TEMPERATURE AND ONE OF HIS RELATIVE WAS SICK AT HOME:  now he is found to have influenza:   cxr with subtle rll infiltrate:  not convincing for pneumonia to me   do ct chest non contrast    he is already on rx for influenza and CAP:   ct scan chest will clarify further :  VBG with no co2 retention ;    3/21:  ct chest showed: Mild/moderate biapical consolidative airspace opacities with focal bronchial dilatation, right greater than left. There is mild bilateral lower lobe airspace infiltrate and/or subsegmental atelectasis. No sara consolidation. Mild bilateral lower lobe bronchiectasis.Cholelithiasis. 1.3 cm gallstone region of gallbladder neck. No  pericholecystic inflammatory changes.  start advair  antibtiocs for five days total    3/23: seems OK: no sob:    second last day of antibiotics:   he is on room air;  he isn ot wheezing       Parkinsosn disease  carbidopa/levodopa  25/100 1 Tablet(s) Oral four times a day    HTN  lisinopril 40 milliGRAM(s) Oral daily    DM  cont to monitor and control    HLD/BPH  tamsulosin 0.4 milliGRAM(s) Oral at bedtime    ? depression  escitalopram 5 milliGRAM(s) Oral daily  traZODone 150 milliGRAM(s) Oral daily    dw acp  : needs repeat ct scan chest  in 6 weeks time after dc for follow up

## 2025-03-24 ENCOUNTER — TRANSCRIPTION ENCOUNTER (OUTPATIENT)
Age: 85
End: 2025-03-24

## 2025-03-24 VITALS
SYSTOLIC BLOOD PRESSURE: 118 MMHG | OXYGEN SATURATION: 97 % | HEART RATE: 63 BPM | RESPIRATION RATE: 18 BRPM | TEMPERATURE: 98 F | DIASTOLIC BLOOD PRESSURE: 72 MMHG

## 2025-03-24 DIAGNOSIS — R91.8 OTHER NONSPECIFIC ABNORMAL FINDING OF LUNG FIELD: ICD-10-CM

## 2025-03-24 DIAGNOSIS — J10.1 INFLUENZA DUE TO OTHER IDENTIFIED INFLUENZA VIRUS WITH OTHER RESPIRATORY MANIFESTATIONS: ICD-10-CM

## 2025-03-24 DIAGNOSIS — J47.9 BRONCHIECTASIS, UNCOMPLICATED: ICD-10-CM

## 2025-03-24 LAB
CULTURE RESULTS: SIGNIFICANT CHANGE UP
CULTURE RESULTS: SIGNIFICANT CHANGE UP
GLUCOSE BLDC GLUCOMTR-MCNC: 112 MG/DL — HIGH (ref 70–99)
GLUCOSE BLDC GLUCOMTR-MCNC: 97 MG/DL — SIGNIFICANT CHANGE UP (ref 70–99)
GLUCOSE BLDC GLUCOMTR-MCNC: 99 MG/DL — SIGNIFICANT CHANGE UP (ref 70–99)
SPECIMEN SOURCE: SIGNIFICANT CHANGE UP
SPECIMEN SOURCE: SIGNIFICANT CHANGE UP

## 2025-03-24 PROCEDURE — 96374 THER/PROPH/DIAG INJ IV PUSH: CPT

## 2025-03-24 PROCEDURE — 84132 ASSAY OF SERUM POTASSIUM: CPT

## 2025-03-24 PROCEDURE — 84295 ASSAY OF SERUM SODIUM: CPT

## 2025-03-24 PROCEDURE — 87077 CULTURE AEROBIC IDENTIFY: CPT

## 2025-03-24 PROCEDURE — 83735 ASSAY OF MAGNESIUM: CPT

## 2025-03-24 PROCEDURE — 71045 X-RAY EXAM CHEST 1 VIEW: CPT

## 2025-03-24 PROCEDURE — 85018 HEMOGLOBIN: CPT

## 2025-03-24 PROCEDURE — 83605 ASSAY OF LACTIC ACID: CPT

## 2025-03-24 PROCEDURE — 94640 AIRWAY INHALATION TREATMENT: CPT

## 2025-03-24 PROCEDURE — 93010 ELECTROCARDIOGRAM REPORT: CPT

## 2025-03-24 PROCEDURE — 85027 COMPLETE CBC AUTOMATED: CPT

## 2025-03-24 PROCEDURE — 36415 COLL VENOUS BLD VENIPUNCTURE: CPT

## 2025-03-24 PROCEDURE — 96375 TX/PRO/DX INJ NEW DRUG ADDON: CPT

## 2025-03-24 PROCEDURE — 87040 BLOOD CULTURE FOR BACTERIA: CPT

## 2025-03-24 PROCEDURE — 82803 BLOOD GASES ANY COMBINATION: CPT

## 2025-03-24 PROCEDURE — 80048 BASIC METABOLIC PNL TOTAL CA: CPT

## 2025-03-24 PROCEDURE — 85730 THROMBOPLASTIN TIME PARTIAL: CPT

## 2025-03-24 PROCEDURE — 97162 PT EVAL MOD COMPLEX 30 MIN: CPT

## 2025-03-24 PROCEDURE — 87086 URINE CULTURE/COLONY COUNT: CPT

## 2025-03-24 PROCEDURE — 82330 ASSAY OF CALCIUM: CPT

## 2025-03-24 PROCEDURE — 93005 ELECTROCARDIOGRAM TRACING: CPT

## 2025-03-24 PROCEDURE — 80053 COMPREHEN METABOLIC PANEL: CPT

## 2025-03-24 PROCEDURE — 87637 SARSCOV2&INF A&B&RSV AMP PRB: CPT

## 2025-03-24 PROCEDURE — 85025 COMPLETE CBC W/AUTO DIFF WBC: CPT

## 2025-03-24 PROCEDURE — 71250 CT THORAX DX C-: CPT | Mod: MC

## 2025-03-24 PROCEDURE — 82947 ASSAY GLUCOSE BLOOD QUANT: CPT

## 2025-03-24 PROCEDURE — 85014 HEMATOCRIT: CPT

## 2025-03-24 PROCEDURE — 99285 EMERGENCY DEPT VISIT HI MDM: CPT | Mod: 25

## 2025-03-24 PROCEDURE — 81001 URINALYSIS AUTO W/SCOPE: CPT

## 2025-03-24 PROCEDURE — 72170 X-RAY EXAM OF PELVIS: CPT

## 2025-03-24 PROCEDURE — 83036 HEMOGLOBIN GLYCOSYLATED A1C: CPT

## 2025-03-24 PROCEDURE — 82962 GLUCOSE BLOOD TEST: CPT

## 2025-03-24 PROCEDURE — 85610 PROTHROMBIN TIME: CPT

## 2025-03-24 PROCEDURE — 84484 ASSAY OF TROPONIN QUANT: CPT

## 2025-03-24 PROCEDURE — 72125 CT NECK SPINE W/O DYE: CPT | Mod: MC

## 2025-03-24 PROCEDURE — 0225U NFCT DS DNA&RNA 21 SARSCOV2: CPT

## 2025-03-24 PROCEDURE — 82435 ASSAY OF BLOOD CHLORIDE: CPT

## 2025-03-24 PROCEDURE — 70450 CT HEAD/BRAIN W/O DYE: CPT | Mod: MC

## 2025-03-24 RX ORDER — IPRATROPIUM BROMIDE AND ALBUTEROL SULFATE .5; 2.5 MG/3ML; MG/3ML
3 SOLUTION RESPIRATORY (INHALATION)
Qty: 360 | Refills: 0
Start: 2025-03-24 | End: 2025-04-22

## 2025-03-24 RX ADMIN — ESCITALOPRAM OXALATE 5 MILLIGRAM(S): 20 TABLET ORAL at 13:21

## 2025-03-24 RX ADMIN — ENOXAPARIN SODIUM 40 MILLIGRAM(S): 100 INJECTION SUBCUTANEOUS at 17:11

## 2025-03-24 RX ADMIN — Medication 150 MILLIGRAM(S): at 13:21

## 2025-03-24 RX ADMIN — Medication 1 DROP(S): at 13:21

## 2025-03-24 RX ADMIN — IPRATROPIUM BROMIDE AND ALBUTEROL SULFATE 3 MILLILITER(S): .5; 2.5 SOLUTION RESPIRATORY (INHALATION) at 13:21

## 2025-03-24 RX ADMIN — IPRATROPIUM BROMIDE AND ALBUTEROL SULFATE 3 MILLILITER(S): .5; 2.5 SOLUTION RESPIRATORY (INHALATION) at 05:47

## 2025-03-24 RX ADMIN — IPRATROPIUM BROMIDE AND ALBUTEROL SULFATE 3 MILLILITER(S): .5; 2.5 SOLUTION RESPIRATORY (INHALATION) at 17:11

## 2025-03-24 RX ADMIN — LISINOPRIL 40 MILLIGRAM(S): 5 TABLET ORAL at 05:46

## 2025-03-24 RX ADMIN — Medication 1 DROP(S): at 05:46

## 2025-03-24 RX ADMIN — BRIMONIDINE TARTRATE 1 DROP(S): 1.5 SOLUTION/ DROPS OPHTHALMIC at 07:39

## 2025-03-24 RX ADMIN — Medication 1 DOSE(S): at 17:11

## 2025-03-24 RX ADMIN — Medication 1 TABLET(S): at 05:46

## 2025-03-24 RX ADMIN — DORZOLAMIDE HYDROCHLORIDE AND TIMOLOL MALEATE 1 DROP(S): 20; 5 SOLUTION/ DROPS OPHTHALMIC at 17:11

## 2025-03-24 RX ADMIN — BRIMONIDINE TARTRATE 1 DROP(S): 1.5 SOLUTION/ DROPS OPHTHALMIC at 18:30

## 2025-03-24 RX ADMIN — OSELTAMIVIR PHOSPHATE 30 MILLIGRAM(S): 75 CAPSULE ORAL at 05:46

## 2025-03-24 RX ADMIN — Medication 1 TABLET(S): at 13:21

## 2025-03-24 RX ADMIN — Medication 1 DOSE(S): at 05:46

## 2025-03-24 RX ADMIN — DORZOLAMIDE HYDROCHLORIDE AND TIMOLOL MALEATE 1 DROP(S): 20; 5 SOLUTION/ DROPS OPHTHALMIC at 05:46

## 2025-03-24 RX ADMIN — GABAPENTIN 100 MILLIGRAM(S): 400 CAPSULE ORAL at 13:21

## 2025-03-24 RX ADMIN — Medication 1 TABLET(S): at 17:11

## 2025-03-24 RX ADMIN — GABAPENTIN 100 MILLIGRAM(S): 400 CAPSULE ORAL at 05:46

## 2025-03-24 NOTE — PROVIDER CONTACT NOTE (OTHER) - SITUATION
Pt VS, 160/84mmHg, HR: 70BPM, 98.3 F, 95% pulse ox. Pt also states that he feels fluttering in the left lower quadrant of his abdomen.

## 2025-03-24 NOTE — PROGRESS NOTE ADULT - ASSESSMENT
Flu A / PNA  abx  plan as per ID/pulm    HTN  cont current meds   monitor for O.H. given Parkinson   high risk of autonomic dysfunction     HLD  on statin     DM II  Monitor finger stick. Insulin coverage. Diabetic education and Diabetic diet.

## 2025-03-24 NOTE — PROGRESS NOTE ADULT - ASSESSMENT
Patient is a 84 year old male with PMH of Parkinson's disease, BPH, HTN, HLD, DM, UTIs, from home who presents with lethargy. Since Saturday patient has been experiencing low grade fevers and non productive cough and yesterday had fever of 100.4F, was given Tylenol 650mg at 1020AM. Family states one of patients son's was sick with URI last week and was around patient at that time. Patient has been generally weak, usually ambulates with cane but has been requiring more assistance over the last few days. He reported to family that he fell last night, slid off the bed but denies head trauma. Was able to bear weight after fall and ambulate today however again with assistance.    Sepsis and acute hypoxic respiratory failure due to FluA with superimposed pneumonia   Urinary tract infection  - fevers, AMS with +fluA on admission, recent sick contact-son and resp sx  - CXR with patchy RLL opacity - atelectasis v pneumonia   - noted history of Parkinsons, possible aspiration vs CAP  - UA with pyuria, no bacteria noted   - CTH with suspected sinusitis, no acute hemorrhage/mass effect  - 3/19 Bcx - NGTD x2    - 3/19 Ucx   Normal Urogenital pita present  - afebrile >24h, WBC wnl, off NC and on RA    s/p azithromycin 3/19-3/21  s/p ceftriaxone 3/19-3/23   s/p tamiflu 3/19-3/23     Recommendations:   Continue off antibiotics   Supportive care  Aspiration precautions  Continue rest of care per primary team     Stable from ID standpoint at this time.   Please call if any questions, thank you.     Leona Santana M.D.  [facility]  Available on [employer] TEAMS - *PREFERRED*  [phone]  After 5pm on weekdays and all day on weekends - please call [phone]     Thank you for consulting us and involving us in the management of this patients case. In addition to reviewing history, imaging, documents, labs, microbiology, took into account antibiotic stewardship, local antibiogram and infection control strategies and potential transmission issues at time of treatment decision making process.

## 2025-03-24 NOTE — DISCHARGE NOTE NURSING/CASE MANAGEMENT/SOCIAL WORK - PATIENT PORTAL LINK FT
You can access the FollowMyHealth Patient Portal offered by [facility] by registering at the following website: [email or web address]. By joining [employer]’s FollowMyHealth portal, you will also be able to view your health information using other applications (apps) compatible with our system.

## 2025-03-24 NOTE — DISCHARGE NOTE NURSING/CASE MANAGEMENT/SOCIAL WORK - FINANCIAL ASSISTANCE
[facility] provides services at a reduced cost to those who are determined to be eligible through [facility]’s financial assistance program. Information regarding [facility]’s financial assistance program can be found by going to [email or web address] or by calling [phone].

## 2025-03-24 NOTE — PROGRESS NOTE ADULT - ASSESSMENT
85 yo M with Parkinson's disease, BPH , HTN, HLD, DM, UTIs, from home p/w lethargy. Since Saturday pt has been experiencing low grade fevers and non prod cough. Today tmax  of 100.4, was given Tylenol 650mg at 1020AM. Family states one of pts son's was sick with URI last week and was around pt at that time. Pt has been generally weak, usually ambulates with cane but has been requiring more assistance over the last few days. He reported to family that he fell last night, slid off the bed but denies head trauma. Was able to bear weight after fall and ambulate today however again with assistance. Denies nausea, vomiting, chest pain, SOB, abd pain, diarrhea, urinary complaints, headache, dizziness. Of note, family called pts PMD and informed them of sxs    AAOx1-2, slurred a bit ,+ EPS and tremor B/L 2/2 PD, walker baseline   + flu   CTH neg expecpt suspcted sinusitis  CT C spine mulitlevel degnertive changes     Impression   1) AMS likely toxic metabolic encephalopthy from infection/flu/sinusitis   2) parkinsons dementia     - no change in home sinemet getting 25/100 4x/day  - treatment of infection on CTX and azitrhomycin now ; tamiflu  - unclear if would be able to get MRI brain with cochlear implant. hold off for now.  CTH if change in exam  - check orthostatics   - PT/OT   - check FS, glucose control <180  - GI/DVT ppx   Thank you for allowing me to participate in the care of this patient. Call with questions.   dc planning   Ky Goff MD  Vascular Neurology  Office: [phone]

## 2025-03-24 NOTE — DISCHARGE NOTE NURSING/CASE MANAGEMENT/SOCIAL WORK - NSDCFUADDAPPT_GEN_ALL_CORE_FT
APPTS ARE READY TO BE MADE: [X] YES    Best Family or Patient Contact (if needed):    Additional Information about above appointments (if needed):    1: PCP  2: HOME Pulmonary  3: Neurology     Other comments or requests:

## 2025-03-24 NOTE — PROGRESS NOTE ADULT - PROVIDER SPECIALTY LIST ADULT
Hospitalist
Infectious Disease
Cardiology
Neurology
Pulmonology
Pulmonology
Cardiology
Hospitalist
Neurology
Pulmonology
Pulmonology
Cardiology
Infectious Disease
Infectious Disease
Neurology

## 2025-03-24 NOTE — PROGRESS NOTE ADULT - REASON FOR ADMISSION
lethargy  and SOB

## 2025-03-24 NOTE — PROGRESS NOTE ADULT - ASSESSMENT
83 yo M with a PMH of Parkinson's disease, BPH , HTN, HLD, DM, UTIs, from home p/w lethargy. Since Saturday pt has been experiencing low grade fevers and non prod cough. Today tmax  of 100.4, was given Tylenol 650mg at 1020AM. Family states one of pts son's was sick with URI last week and was around pt at that time. Pt has been generally weak, usually ambulates with cane but has been requiring more assistance over the last few days. He reported to family that he fell last night, slid off the bed but denies head trauma. Was able to bear weight after fall and ambulate today however again with assistance. Denies nausea, vomiting, chest pain, SOB, abd pain, diarrhea, urinary complaints, headache, dizziness. Of note, family called pts PMD and informed them of sxs (19 Mar 2025 17:03)  he is found to influenza:   spoke to his son : he never smoked:  he had had pneumonia before   5 years ago, :  not on any pumps and nebs:     Influenza/pneumonia  Parkinsosn disease  HTN  DM  HLD/BPH    Influenza/pneumonia  HAS HAD LOW GRADE TEMPERATURE AND ONE OF HIS RELATIVE WAS SICK AT HOME:  now he is found to have influenza:   cxr with subtle rll infiltrate:  not convincing for pneumonia to me   do ct chest non contrast    he is already on rx for influenza and CAP:   ct scan chest will clarify further :  VBG with no co2 retention ;    3/21:  ct chest showed: Mild/moderate biapical consolidative airspace opacities with focal bronchial dilatation, right greater than left. There is mild bilateral lower lobe airspace infiltrate and/or subsegmental atelectasis. No sara consolidation. Mild bilateral lower lobe bronchiectasis.Cholelithiasis. 1.3 cm gallstone region of gallbladder neck. No  pericholecystic inflammatory changes.  start advair  antibiotics for five days total    3/23: seems OK: no sob:    second last day of antibiotics:   he is on room air;  he isn ot wheezing     3/24: seems pretty good: no sob:   no fever;    for dc today  on BD       Parkinsosn disease  carbidopa/levodopa  25/100 1 Tablet(s) Oral four times a day    HTN  lisinopril 40 milliGRAM(s) Oral daily    DM  cont to monitor and control    HLD/BPH  tamsulosin 0.4 milliGRAM(s) Oral at bedtime    ? depression  escitalopram 5 milliGRAM(s) Oral daily  traZODone 150 milliGRAM(s) Oral daily    dw acp  : needs repeat ct scan chest  in 6 weeks time after dc for follow up

## 2025-03-24 NOTE — DISCHARGE NOTE NURSING/CASE MANAGEMENT/SOCIAL WORK - NSDCPEFALRISK_GEN_ALL_CORE
For information on Fall & Injury Prevention, visit: [email or web address] OR  [email or web address] OR  https://www.cdc.gov/steadi/patient.html

## 2025-03-24 NOTE — PROGRESS NOTE ADULT - SUBJECTIVE AND OBJECTIVE BOX
Subjective: Patient seen and examined. No new events except as noted.     SUBJECTIVE/ROS:  nad      MEDICATIONS:  MEDICATIONS  (STANDING):  acetaminophen   IVPB .. 1000 milliGRAM(s) IV Intermittent once  albuterol/ipratropium for Nebulization 3 milliLiter(s) Nebulizer every 6 hours  artificial  tears Solution 1 Drop(s) Both EYES three times a day  atorvastatin 20 milliGRAM(s) Oral at bedtime  azithromycin  IVPB 500 milliGRAM(s) IV Intermittent every 24 hours  brimonidine 0.2% Ophthalmic Solution 1 Drop(s) Both EYES two times a day  carbidopa/levodopa  25/100 1 Tablet(s) Oral four times a day  cefTRIAXone   IVPB 1000 milliGRAM(s) IV Intermittent every 24 hours  dextrose 5%. 1000 milliLiter(s) (50 mL/Hr) IV Continuous <Continuous>  dextrose 5%. 1000 milliLiter(s) (100 mL/Hr) IV Continuous <Continuous>  dextrose 50% Injectable 25 Gram(s) IV Push once  dextrose 50% Injectable 12.5 Gram(s) IV Push once  dextrose 50% Injectable 25 Gram(s) IV Push once  dorzolamide 2%/timolol 0.5% Ophthalmic Solution 1 Drop(s) Both EYES two times a day  enoxaparin Injectable 40 milliGRAM(s) SubCutaneous every 24 hours  escitalopram 5 milliGRAM(s) Oral daily  gabapentin 100 milliGRAM(s) Oral three times a day  glucagon  Injectable 1 milliGRAM(s) IntraMuscular once  insulin lispro (ADMELOG) corrective regimen sliding scale   SubCutaneous three times a day before meals  latanoprost 0.005% Ophthalmic Solution 1 Drop(s) Both EYES at bedtime  lisinopril 40 milliGRAM(s) Oral daily  oseltamivir 30 milliGRAM(s) Oral two times a day  tamsulosin 0.4 milliGRAM(s) Oral at bedtime  traZODone 150 milliGRAM(s) Oral daily      PHYSICAL EXAM:  T(C): 36.7 (03-20-25 @ 05:18), Max: 38.4 (03-19-25 @ 12:32)  HR: 76 (03-20-25 @ 05:18) (68 - 105)  BP: 150/71 (03-20-25 @ 05:18) (100/55 - 150/71)  RR: 18 (03-20-25 @ 05:18) (16 - 20)  SpO2: 99% (03-20-25 @ 05:18) (95% - 99%)  Wt(kg): --  I&O's Summary      Weight (kg): 70 (03-19 @ 12:32)      JVP: Normal  Neck: supple  Lung: clear   CV: S1 S2 ,  Abd: soft  Ext: No edema  neuro: Awake / alert  Psych: flat affect  Skin: normal``    LABS/DATA:    CARDIAC MARKERS:                                12.1   5.87  )-----------( 157      ( 20 Mar 2025 06:55 )             36.7     03-20    136  |  102  |  23  ----------------------------<  83  3.8   |  19[L]  |  1.09    Ca    9.2      20 Mar 2025 06:55  Mg     2.0     03-19    TPro  7.2  /  Alb  3.7  /  TBili  0.4  /  DBili  x   /  AST  101[H]  /  ALT  7[L]  /  AlkPhos  87  03-20    proBNP:   Lipid Profile:   HgA1c:   TSH:             
Date of Service: 03-22-25 @ 13:59    Patient is a 84y old  Male who presents with a chief complaint of lethargy  and SOB (22 Mar 2025 11:30)      Any change in ROS: seems to be doing  ok ;    MEDICATIONS  (STANDING):  acetaminophen   IVPB .. 1000 milliGRAM(s) IV Intermittent once  albuterol/ipratropium for Nebulization 3 milliLiter(s) Nebulizer every 6 hours  artificial  tears Solution 1 Drop(s) Both EYES three times a day  atorvastatin 20 milliGRAM(s) Oral at bedtime  azithromycin  IVPB 500 milliGRAM(s) IV Intermittent every 24 hours  brimonidine 0.2% Ophthalmic Solution 1 Drop(s) Both EYES two times a day  carbidopa/levodopa  25/100 1 Tablet(s) Oral four times a day  cefTRIAXone   IVPB 1000 milliGRAM(s) IV Intermittent every 24 hours  dextrose 5%. 1000 milliLiter(s) (50 mL/Hr) IV Continuous <Continuous>  dextrose 5%. 1000 milliLiter(s) (100 mL/Hr) IV Continuous <Continuous>  dextrose 50% Injectable 25 Gram(s) IV Push once  dextrose 50% Injectable 12.5 Gram(s) IV Push once  dextrose 50% Injectable 25 Gram(s) IV Push once  dorzolamide 2%/timolol 0.5% Ophthalmic Solution 1 Drop(s) Both EYES two times a day  enoxaparin Injectable 40 milliGRAM(s) SubCutaneous every 24 hours  escitalopram 5 milliGRAM(s) Oral daily  fluticasone propionate/ salmeterol 250-50 MICROgram(s) Diskus 1 Dose(s) Inhalation two times a day  gabapentin 100 milliGRAM(s) Oral three times a day  glucagon  Injectable 1 milliGRAM(s) IntraMuscular once  insulin lispro (ADMELOG) corrective regimen sliding scale   SubCutaneous three times a day before meals  latanoprost 0.005% Ophthalmic Solution 1 Drop(s) Both EYES at bedtime  lisinopril 40 milliGRAM(s) Oral daily  oseltamivir 30 milliGRAM(s) Oral two times a day  tamsulosin 0.4 milliGRAM(s) Oral at bedtime  traZODone 150 milliGRAM(s) Oral daily    MEDICATIONS  (PRN):  dextrose Oral Gel 15 Gram(s) Oral once PRN Blood Glucose LESS THAN 70 milliGRAM(s)/deciliter    Vital Signs Last 24 Hrs  T(C): 36.6 (22 Mar 2025 11:10), Max: 36.9 (21 Mar 2025 20:24)  T(F): 97.8 (22 Mar 2025 11:10), Max: 98.4 (21 Mar 2025 20:24)  HR: 76 (22 Mar 2025 11:10) (75 - 77)  BP: 152/70 (22 Mar 2025 11:10) (138/78 - 152/70)  BP(mean): --  RR: 18 (22 Mar 2025 11:10) (18 - 18)  SpO2: 98% (22 Mar 2025 11:10) (96% - 98%)    Parameters below as of 22 Mar 2025 11:10  Patient On (Oxygen Delivery Method): room air        I&O's Summary    21 Mar 2025 07:01  -  22 Mar 2025 07:00  --------------------------------------------------------  IN: 240 mL / OUT: 350 mL / NET: -110 mL    22 Mar 2025 07:01  -  22 Mar 2025 13:59  --------------------------------------------------------  IN: 0 mL / OUT: 600 mL / NET: -600 mL          Physical Exam:   GENERAL: NAD, well-groomed, well-developed  HEENT: RAYMOND/   Atraumatic, Normocephalic  ENMT: No tonsillar erythema, exudates, or enlargement; Moist mucous membranes, Good dentition, No lesions  NECK: Supple, No JVD, Normal thyroid  CHEST/LUNG: Clear to auscultaion  CVS: Regular rate and rhythm; No murmurs, rubs, or gallops  GI: : Soft, Nontender, Nondistended; Bowel sounds present  NERVOUS SYSTEM:  more alert and awake:   EXTREMITIES: -edema  LYMPH: No lymphadenopathy noted  SKIN: No rashes or lesions  ENDOCRINOLOGY: No Thyromegaly  PSYCH: Appropriate    Labs:  22                            12.3   6.13  )-----------( 178      ( 22 Mar 2025 07:16 )             37.0                         11.9   5.97  )-----------( 174      ( 21 Mar 2025 06:56 )             35.5                         12.1   5.87  )-----------( 157      ( 20 Mar 2025 06:55 )             36.7                         12.0   5.85  )-----------( 163      ( 19 Mar 2025 12:40 )             35.3     03-21    135  |  101  |  25[H]  ----------------------------<  88  4.0   |  19[L]  |  1.10  03-20    136  |  102  |  23  ----------------------------<  83  3.8   |  19[L]  |  1.09  03-19    137  |  101  |  28[H]  ----------------------------<  102[H]  3.7   |  19[L]  |  1.28    Ca    9.2      21 Mar 2025 06:56    TPro  7.2  /  Alb  3.7  /  TBili  0.4  /  DBili  x   /  AST  101[H]  /  ALT  7[L]  /  AlkPhos  87  03-20  TPro  7.6  /  Alb  3.9  /  TBili  0.4  /  DBili  x   /  AST  46[H]  /  ALT  9[L]  /  AlkPhos  97  03-19    CAPILLARY BLOOD GLUCOSE      POCT Blood Glucose.: 110 mg/dL (22 Mar 2025 11:10)  POCT Blood Glucose.: 97 mg/dL (22 Mar 2025 07:35)  POCT Blood Glucose.: 107 mg/dL (21 Mar 2025 21:24)  POCT Blood Glucose.: 132 mg/dL (21 Mar 2025 16:17)          Urinalysis Basic - ( 21 Mar 2025 06:56 )    Color: x / Appearance: x / SG: x / pH: x  Gluc: 88 mg/dL / Ketone: x  / Bili: x / Urobili: x   Blood: x / Protein: x / Nitrite: x   Leuk Esterase: x / RBC: x / WBC x   Sq Epi: x / Non Sq Epi: x / Bacteria: x            RECENT CULTURES:  03-19 @ 16:14 Clean Catch Clean Catch (Midstream)         ra< from: CT Chest No Cont (03.20.25 @ 18:40) >  IMPRESSION:  Mild/moderate biapical consolidative airspace opacities with focal   bronchial dilatation, right greater than left.    There is mild bilateral lower lobe airspace infiltrate and/or   subsegmental atelectasis. No sara consolidation. Mild bilateral lower   lobe bronchiectasis.    Cholelithiasis. 1.3 cmgallstone region of gallbladder neck. No   pericholecystic inflammatory changes.    Please refer to detailed findings otherwise described above.    < end of copied text >         Normal Urogenital pita present    03-19 @ 12:15 Blood Blood-Peripheral                No growth at 48 Hours    03-19 @ 12:00 Blood Blood-Peripheral                No growth at 48 Hours          RESPIRATORY CULTURES:          Studies  Chest X-RAY  CT SCAN Chest   Venous Dopplers: LE:   CT Abdomen  Others              
Date of Service: 03-24-25 @ 15:21    Patient is a 84y old  Male who presents with a chief complaint of lethargy  and SOB (24 Mar 2025 08:23)      Any change in ROS: seems to be doing  ok : on room air:  has tremors of hands     MEDICATIONS  (STANDING):  acetaminophen   IVPB .. 1000 milliGRAM(s) IV Intermittent once  albuterol/ipratropium for Nebulization 3 milliLiter(s) Nebulizer every 6 hours  artificial  tears Solution 1 Drop(s) Both EYES three times a day  atorvastatin 20 milliGRAM(s) Oral at bedtime  brimonidine 0.2% Ophthalmic Solution 1 Drop(s) Both EYES two times a day  carbidopa/levodopa  25/100 1 Tablet(s) Oral four times a day  dextrose 5%. 1000 milliLiter(s) (50 mL/Hr) IV Continuous <Continuous>  dextrose 5%. 1000 milliLiter(s) (100 mL/Hr) IV Continuous <Continuous>  dextrose 50% Injectable 25 Gram(s) IV Push once  dextrose 50% Injectable 12.5 Gram(s) IV Push once  dextrose 50% Injectable 25 Gram(s) IV Push once  dorzolamide 2%/timolol 0.5% Ophthalmic Solution 1 Drop(s) Both EYES two times a day  enoxaparin Injectable 40 milliGRAM(s) SubCutaneous every 24 hours  escitalopram 5 milliGRAM(s) Oral daily  fluticasone propionate/ salmeterol 250-50 MICROgram(s) Diskus 1 Dose(s) Inhalation two times a day  gabapentin 100 milliGRAM(s) Oral three times a day  glucagon  Injectable 1 milliGRAM(s) IntraMuscular once  insulin lispro (ADMELOG) corrective regimen sliding scale   SubCutaneous three times a day before meals  latanoprost 0.005% Ophthalmic Solution 1 Drop(s) Both EYES at bedtime  lisinopril 40 milliGRAM(s) Oral daily  tamsulosin 0.4 milliGRAM(s) Oral at bedtime  traZODone 150 milliGRAM(s) Oral daily    MEDICATIONS  (PRN):  dextrose Oral Gel 15 Gram(s) Oral once PRN Blood Glucose LESS THAN 70 milliGRAM(s)/deciliter    Vital Signs Last 24 Hrs  T(C): 36.6 (24 Mar 2025 13:43), Max: 36.8 (24 Mar 2025 03:10)  T(F): 97.8 (24 Mar 2025 13:43), Max: 98.3 (24 Mar 2025 03:10)  HR: 79 (24 Mar 2025 13:43) (60 - 79)  BP: 112/64 (24 Mar 2025 13:43) (112/64 - 160/84)  BP(mean): --  RR: 18 (24 Mar 2025 13:43) (18 - 18)  SpO2: 96% (24 Mar 2025 13:43) (95% - 97%)    Parameters below as of 24 Mar 2025 13:43  Patient On (Oxygen Delivery Method): room air        I&O's Summary    23 Mar 2025 07:01  -  24 Mar 2025 07:00  --------------------------------------------------------  IN: 0 mL / OUT: 300 mL / NET: -300 mL          Physical Exam:   GENERAL: NAD, well-groomed, well-developed  HEENT: RAYMOND/   Atraumatic, Normocephalic  ENMT: No tonsillar erythema, exudates, or enlargement; Moist mucous membranes, Good dentition, No lesions  NECK: Supple, No JVD, Normal thyroid  CHEST/LUNG: Clear to auscultaion- no wheezing  CVS: Regular rate and rhythm; No murmurs, rubs, or gallops  GI: : Soft, Nontender, Nondistended; Bowel sounds present  NERVOUS SYSTEM:  Alert & Oriented X3  EXTREMITIES: -edema  LYMPH: No lymphadenopathy noted  SKIN: No rashes or lesions  ENDOCRINOLOGY: No Thyromegaly  PSYCH: Appropriate    Labs:  22                            12.3   6.13  )-----------( 178      ( 22 Mar 2025 07:16 )             37.0                         11.9   5.97  )-----------( 174      ( 21 Mar 2025 06:56 )             35.5     03-21    135  |  101  |  25[H]  ----------------------------<  88  4.0   |  19[L]  |  1.10        CAPILLARY BLOOD GLUCOSE      POCT Blood Glucose.: 99 mg/dL (24 Mar 2025 11:55)  POCT Blood Glucose.: 97 mg/dL (24 Mar 2025 07:32)  POCT Blood Glucose.: 116 mg/dL (23 Mar 2025 21:31)  POCT Blood Glucose.: 137 mg/dL (23 Mar 2025 16:29)                  RECENT CULTURES:  03-19 @ 16:14 Clean Catch Clean Catch (Midstream)     rad< from: CT Chest No Cont (03.20.25 @ 18:40) >  Mild/moderate biapical consolidative airspace opacities with focal   bronchial dilatation, right greater than left.    There is mild bilateral lower lobe airspace infiltrate and/or   subsegmental atelectasis. No sara consolidation. Mild bilateral lower   lobe bronchiectasis.    Cholelithiasis. 1.3 cmgallstone region of gallbladder neck. No   pericholecystic inflammatory changes.    Please refer to detailed findings otherwise described above.    < end of copied text >             Normal Urogenital pita present    03-19 @ 12:15 Blood Blood-Peripheral                No growth at 4 days    03-19 @ 12:00 Blood Blood-Peripheral                No growth at 4 days          RESPIRATORY CULTURES:          Studies  Chest X-RAY  CT SCAN Chest   Venous Dopplers: LE:   CT Abdomen  Others              
[facility]  [facility] Wilder Y. Patel, S. Shah, G. Casimir  [phone]  ([phone] - weekdays after 5pm and weekends)    Name: JENNIFER CANTOR  Age/Gender: 84y Male  MRN: [id number]    Interval History:  Patient seen and examined this morning.   Notes reviewed  No concerning overnight events  Afebrile   Allergies: No Known Allergies      Objective:  Vitals:   T(F): 98.3 (03-24-25 @ 05:00), Max: 98.3 (03-24-25 @ 03:10)  HR: 63 (03-24-25 @ 05:00) (60 - 70)  BP: 122/65 (03-24-25 @ 05:00) (108/61 - 160/84)  RR: 18 (03-24-25 @ 05:00) (18 - 18)  SpO2: 97% (03-24-25 @ 05:00) (95% - 97%)  Physical Examination:  General: no acute distress  HEENT: normocephalic, atraumatic, anicteric  Respiratory: no acc muscle use, breathing comfortably  Cardiovascular: S1 and S2 present  Gastrointestinal: normal appearing, nondistended  Extremities: no edema, no cyanosis  Skin: no visible rash    Laboratory Studies:  CBC:   WBC Trend:  6.13 03-22-25 @ 07:16  5.97 03-21-25 @ 06:56  5.87 03-20-25 @ 06:55  5.85 03-19-25 @ 12:40    CMP:   Creatinine: 1.10 mg/dL (03-21-25 @ 06:56)  Creatinine: 1.09 mg/dL (03-20-25 @ 06:55)  Creatinine: 1.28 mg/dL (03-19-25 @ 12:40)    Microbiology: reviewed   Culture - Urine (collected 03-19-25 @ 16:14)  Source: Clean Catch Clean Catch (Midstream)  Final Report (03-21-25 @ 15:07):    Normal Urogenital pita present    Culture - Blood (collected 03-19-25 @ 12:15)  Source: Blood Blood-Peripheral  Preliminary Report (03-23-25 @ 17:01):    No growth at 4 days    Culture - Blood (collected 03-19-25 @ 12:00)  Source: Blood Blood-Peripheral  Preliminary Report (03-23-25 @ 17:01):    No growth at 4 days    03-19-25 @ 12:38 SARS-CoV-2 NotDetec/Influenza A Detected/Influenza B NotDetec/RSV NotDetec    Radiology: reviewed     Medications:  acetaminophen   IVPB .. 1000 milliGRAM(s) IV Intermittent once  albuterol/ipratropium for Nebulization 3 milliLiter(s) Nebulizer every 6 hours  artificial  tears Solution 1 Drop(s) Both EYES three times a day  atorvastatin 20 milliGRAM(s) Oral at bedtime  brimonidine 0.2% Ophthalmic Solution 1 Drop(s) Both EYES two times a day  carbidopa/levodopa  25/100 1 Tablet(s) Oral four times a day  dextrose 5%. 1000 milliLiter(s) IV Continuous <Continuous>  dextrose 5%. 1000 milliLiter(s) IV Continuous <Continuous>  dextrose 50% Injectable 25 Gram(s) IV Push once  dextrose 50% Injectable 12.5 Gram(s) IV Push once  dextrose 50% Injectable 25 Gram(s) IV Push once  dextrose Oral Gel 15 Gram(s) Oral once PRN  dorzolamide 2%/timolol 0.5% Ophthalmic Solution 1 Drop(s) Both EYES two times a day  enoxaparin Injectable 40 milliGRAM(s) SubCutaneous every 24 hours  escitalopram 5 milliGRAM(s) Oral daily  fluticasone propionate/ salmeterol 250-50 MICROgram(s) Diskus 1 Dose(s) Inhalation two times a day  gabapentin 100 milliGRAM(s) Oral three times a day  glucagon  Injectable 1 milliGRAM(s) IntraMuscular once  insulin lispro (ADMELOG) corrective regimen sliding scale   SubCutaneous three times a day before meals  latanoprost 0.005% Ophthalmic Solution 1 Drop(s) Both EYES at bedtime  lisinopril 40 milliGRAM(s) Oral daily  tamsulosin 0.4 milliGRAM(s) Oral at bedtime  traZODone 150 milliGRAM(s) Oral daily    Prior/Completed Antimicrobials:  azithromycin  IVPB  cefTRIAXone   IVPB  cefTRIAXone   IVPB  oseltamivir  oseltamivir  
Patient is a 84y old  Male who presents with a chief complaint of lethargy  and SOB (21 Mar 2025 15:03)    Date of servie : 03-21-25 @ 19:27  INTERVAL HPI/OVERNIGHT EVENTS:  T(C): 37.3 (03-21-25 @ 11:16), Max: 37.3 (03-21-25 @ 11:16)  HR: 75 (03-21-25 @ 11:16) (73 - 80)  BP: 159/68 (03-21-25 @ 11:16) (120/67 - 159/68)  RR: 18 (03-21-25 @ 11:16) (18 - 18)  SpO2: 97% (03-21-25 @ 11:16) (93% - 97%)  Wt(kg): --  I&O's Summary    20 Mar 2025 07:01  -  21 Mar 2025 07:00  --------------------------------------------------------  IN: 360 mL / OUT: 325 mL / NET: 35 mL    21 Mar 2025 07:01  -  21 Mar 2025 19:27  --------------------------------------------------------  IN: 0 mL / OUT: 200 mL / NET: -200 mL        LABS:                        11.9   5.97  )-----------( 174      ( 21 Mar 2025 06:56 )             35.5     03-21    135  |  101  |  25[H]  ----------------------------<  88  4.0   |  19[L]  |  1.10    Ca    9.2      21 Mar 2025 06:56    TPro  7.2  /  Alb  3.7  /  TBili  0.4  /  DBili  x   /  AST  101[H]  /  ALT  7[L]  /  AlkPhos  87  03-20      Urinalysis Basic - ( 21 Mar 2025 06:56 )    Color: x / Appearance: x / SG: x / pH: x  Gluc: 88 mg/dL / Ketone: x  / Bili: x / Urobili: x   Blood: x / Protein: x / Nitrite: x   Leuk Esterase: x / RBC: x / WBC x   Sq Epi: x / Non Sq Epi: x / Bacteria: x      CAPILLARY BLOOD GLUCOSE      POCT Blood Glucose.: 132 mg/dL (21 Mar 2025 16:17)  POCT Blood Glucose.: 103 mg/dL (21 Mar 2025 11:11)  POCT Blood Glucose.: 102 mg/dL (21 Mar 2025 07:27)  POCT Blood Glucose.: 153 mg/dL (20 Mar 2025 21:27)        Urinalysis Basic - ( 21 Mar 2025 06:56 )    Color: x / Appearance: x / SG: x / pH: x  Gluc: 88 mg/dL / Ketone: x  / Bili: x / Urobili: x   Blood: x / Protein: x / Nitrite: x   Leuk Esterase: x / RBC: x / WBC x   Sq Epi: x / Non Sq Epi: x / Bacteria: x        MEDICATIONS  (STANDING):  acetaminophen   IVPB .. 1000 milliGRAM(s) IV Intermittent once  albuterol/ipratropium for Nebulization 3 milliLiter(s) Nebulizer every 6 hours  artificial  tears Solution 1 Drop(s) Both EYES three times a day  atorvastatin 20 milliGRAM(s) Oral at bedtime  azithromycin  IVPB 500 milliGRAM(s) IV Intermittent every 24 hours  brimonidine 0.2% Ophthalmic Solution 1 Drop(s) Both EYES two times a day  carbidopa/levodopa  25/100 1 Tablet(s) Oral four times a day  cefTRIAXone   IVPB 1000 milliGRAM(s) IV Intermittent every 24 hours  dextrose 5%. 1000 milliLiter(s) (50 mL/Hr) IV Continuous <Continuous>  dextrose 5%. 1000 milliLiter(s) (100 mL/Hr) IV Continuous <Continuous>  dextrose 50% Injectable 25 Gram(s) IV Push once  dextrose 50% Injectable 12.5 Gram(s) IV Push once  dextrose 50% Injectable 25 Gram(s) IV Push once  dorzolamide 2%/timolol 0.5% Ophthalmic Solution 1 Drop(s) Both EYES two times a day  enoxaparin Injectable 40 milliGRAM(s) SubCutaneous every 24 hours  escitalopram 5 milliGRAM(s) Oral daily  fluticasone propionate/ salmeterol 250-50 MICROgram(s) Diskus 1 Dose(s) Inhalation two times a day  gabapentin 100 milliGRAM(s) Oral three times a day  glucagon  Injectable 1 milliGRAM(s) IntraMuscular once  insulin lispro (ADMELOG) corrective regimen sliding scale   SubCutaneous three times a day before meals  latanoprost 0.005% Ophthalmic Solution 1 Drop(s) Both EYES at bedtime  lisinopril 40 milliGRAM(s) Oral daily  oseltamivir 30 milliGRAM(s) Oral two times a day  tamsulosin 0.4 milliGRAM(s) Oral at bedtime  traZODone 150 milliGRAM(s) Oral daily    MEDICATIONS  (PRN):  dextrose Oral Gel 15 Gram(s) Oral once PRN Blood Glucose LESS THAN 70 milliGRAM(s)/deciliter          PHYSICAL EXAM:  GENERAL: NAD, well-groomed, well-developed  HEAD:  Atraumatic, Normocephalic  CHEST/LUNG: Clear to percussion bilaterally; No rales, rhonchi, wheezing, or rubs  HEART: Regular rate and rhythm; No murmurs, rubs, or gallops  ABDOMEN: Soft, Nontender, Nondistended; Bowel sounds present  EXTREMITIES:  2+ Peripheral Pulses, No clubbing, cyanosis, or edema  LYMPH: No lymphadenopathy noted  SKIN: No rashes or lesions    Care Discussed with Consultants/Other Providers [ ] YES  [ ] NO
    Subjective: Patient seen and examined. No new events except as noted.     SUBJECTIVE/ROS:  nad      MEDICATIONS:  MEDICATIONS  (STANDING):  acetaminophen   IVPB .. 1000 milliGRAM(s) IV Intermittent once  albuterol/ipratropium for Nebulization 3 milliLiter(s) Nebulizer every 6 hours  artificial  tears Solution 1 Drop(s) Both EYES three times a day  atorvastatin 20 milliGRAM(s) Oral at bedtime  brimonidine 0.2% Ophthalmic Solution 1 Drop(s) Both EYES two times a day  carbidopa/levodopa  25/100 1 Tablet(s) Oral four times a day  cefTRIAXone   IVPB 1000 milliGRAM(s) IV Intermittent every 24 hours  dextrose 5%. 1000 milliLiter(s) (50 mL/Hr) IV Continuous <Continuous>  dextrose 5%. 1000 milliLiter(s) (100 mL/Hr) IV Continuous <Continuous>  dextrose 50% Injectable 25 Gram(s) IV Push once  dextrose 50% Injectable 12.5 Gram(s) IV Push once  dextrose 50% Injectable 25 Gram(s) IV Push once  dorzolamide 2%/timolol 0.5% Ophthalmic Solution 1 Drop(s) Both EYES two times a day  enoxaparin Injectable 40 milliGRAM(s) SubCutaneous every 24 hours  escitalopram 5 milliGRAM(s) Oral daily  fluticasone propionate/ salmeterol 250-50 MICROgram(s) Diskus 1 Dose(s) Inhalation two times a day  gabapentin 100 milliGRAM(s) Oral three times a day  glucagon  Injectable 1 milliGRAM(s) IntraMuscular once  insulin lispro (ADMELOG) corrective regimen sliding scale   SubCutaneous three times a day before meals  latanoprost 0.005% Ophthalmic Solution 1 Drop(s) Both EYES at bedtime  lisinopril 40 milliGRAM(s) Oral daily  oseltamivir 30 milliGRAM(s) Oral two times a day  tamsulosin 0.4 milliGRAM(s) Oral at bedtime  traZODone 150 milliGRAM(s) Oral daily      PHYSICAL EXAM:  T(C): 36.4 (03-23-25 @ 04:58), Max: 36.7 (03-22-25 @ 21:16)  HR: 70 (03-23-25 @ 04:58) (67 - 76)  BP: 131/76 (03-23-25 @ 04:58) (131/76 - 152/70)  RR: 18 (03-23-25 @ 04:58) (18 - 18)  SpO2: 96% (03-23-25 @ 04:58) (96% - 98%)  Wt(kg): --  I&O's Summary    22 Mar 2025 07:01  -  23 Mar 2025 07:00  --------------------------------------------------------  IN: 240 mL / OUT: 1000 mL / NET: -760 mL            JVP: Normal  Neck: supple  Lung: clear   CV: S1 S2 ,  Abd: soft  Ext: No edema  neuro: Awake / alert  Psych: flat affect  Skin: normal``    LABS/DATA:    CARDIAC MARKERS:                                12.3   6.13  )-----------( 178      ( 22 Mar 2025 07:16 )             37.0           proBNP:   Lipid Profile:   HgA1c:   TSH:             
    Subjective: Patient seen and examined. No new events except as noted.     SUBJECTIVE/ROS:  nad      MEDICATIONS:  MEDICATIONS  (STANDING):  acetaminophen   IVPB .. 1000 milliGRAM(s) IV Intermittent once  albuterol/ipratropium for Nebulization 3 milliLiter(s) Nebulizer every 6 hours  artificial  tears Solution 1 Drop(s) Both EYES three times a day  atorvastatin 20 milliGRAM(s) Oral at bedtime  brimonidine 0.2% Ophthalmic Solution 1 Drop(s) Both EYES two times a day  carbidopa/levodopa  25/100 1 Tablet(s) Oral four times a day  dextrose 5%. 1000 milliLiter(s) (100 mL/Hr) IV Continuous <Continuous>  dextrose 5%. 1000 milliLiter(s) (50 mL/Hr) IV Continuous <Continuous>  dextrose 50% Injectable 25 Gram(s) IV Push once  dextrose 50% Injectable 12.5 Gram(s) IV Push once  dextrose 50% Injectable 25 Gram(s) IV Push once  dorzolamide 2%/timolol 0.5% Ophthalmic Solution 1 Drop(s) Both EYES two times a day  enoxaparin Injectable 40 milliGRAM(s) SubCutaneous every 24 hours  escitalopram 5 milliGRAM(s) Oral daily  fluticasone propionate/ salmeterol 250-50 MICROgram(s) Diskus 1 Dose(s) Inhalation two times a day  gabapentin 100 milliGRAM(s) Oral three times a day  glucagon  Injectable 1 milliGRAM(s) IntraMuscular once  insulin lispro (ADMELOG) corrective regimen sliding scale   SubCutaneous three times a day before meals  latanoprost 0.005% Ophthalmic Solution 1 Drop(s) Both EYES at bedtime  lisinopril 40 milliGRAM(s) Oral daily  tamsulosin 0.4 milliGRAM(s) Oral at bedtime  traZODone 150 milliGRAM(s) Oral daily      PHYSICAL EXAM:  T(C): 36.8 (03-24-25 @ 05:00), Max: 36.9 (03-23-25 @ 11:59)  HR: 63 (03-24-25 @ 05:00) (60 - 90)  BP: 122/65 (03-24-25 @ 05:00) (108/61 - 160/84)  RR: 18 (03-24-25 @ 05:00) (18 - 18)  SpO2: 97% (03-24-25 @ 05:00) (94% - 97%)  Wt(kg): --  I&O's Summary    23 Mar 2025 07:01  -  24 Mar 2025 07:00  --------------------------------------------------------  IN: 0 mL / OUT: 300 mL / NET: -300 mL            JVP: Normal  Neck: supple  Lung: clear   CV: S1 S2 ,  Abd: soft  Ext: No edema  neuro: Awake / alert  Psych: flat affect  Skin: normal``    LABS/DATA:    CARDIAC MARKERS:      proBNP:   Lipid Profile:   HgA1c:   TSH:             
Neurology      S: patient seen. on droplet        Medications: MEDICATIONS  (STANDING):  acetaminophen   IVPB .. 1000 milliGRAM(s) IV Intermittent once  albuterol/ipratropium for Nebulization 3 milliLiter(s) Nebulizer every 6 hours  artificial  tears Solution 1 Drop(s) Both EYES three times a day  atorvastatin 20 milliGRAM(s) Oral at bedtime  azithromycin  IVPB 500 milliGRAM(s) IV Intermittent every 24 hours  brimonidine 0.2% Ophthalmic Solution 1 Drop(s) Both EYES two times a day  carbidopa/levodopa  25/100 1 Tablet(s) Oral four times a day  cefTRIAXone   IVPB 1000 milliGRAM(s) IV Intermittent every 24 hours  dextrose 5%. 1000 milliLiter(s) (50 mL/Hr) IV Continuous <Continuous>  dextrose 5%. 1000 milliLiter(s) (100 mL/Hr) IV Continuous <Continuous>  dextrose 50% Injectable 25 Gram(s) IV Push once  dextrose 50% Injectable 12.5 Gram(s) IV Push once  dextrose 50% Injectable 25 Gram(s) IV Push once  dorzolamide 2%/timolol 0.5% Ophthalmic Solution 1 Drop(s) Both EYES two times a day  enoxaparin Injectable 40 milliGRAM(s) SubCutaneous every 24 hours  escitalopram 5 milliGRAM(s) Oral daily  gabapentin 100 milliGRAM(s) Oral three times a day  glucagon  Injectable 1 milliGRAM(s) IntraMuscular once  insulin lispro (ADMELOG) corrective regimen sliding scale   SubCutaneous three times a day before meals  latanoprost 0.005% Ophthalmic Solution 1 Drop(s) Both EYES at bedtime  lisinopril 40 milliGRAM(s) Oral daily  oseltamivir 30 milliGRAM(s) Oral two times a day  tamsulosin 0.4 milliGRAM(s) Oral at bedtime  traZODone 150 milliGRAM(s) Oral daily    MEDICATIONS  (PRN):  dextrose Oral Gel 15 Gram(s) Oral once PRN Blood Glucose LESS THAN 70 milliGRAM(s)/deciliter       Vitals:  Vital Signs Last 24 Hrs  T(C): 37.1 (21 Mar 2025 04:57), Max: 37.1 (21 Mar 2025 04:57)  T(F): 98.8 (21 Mar 2025 04:57), Max: 98.8 (21 Mar 2025 04:57)  HR: 73 (21 Mar 2025 04:57) (73 - 80)  BP: 120/67 (21 Mar 2025 04:57) (120/67 - 131/75)  BP(mean): --  RR: 18 (21 Mar 2025 04:57) (18 - 18)  SpO2: 93% (21 Mar 2025 04:57) (91% - 93%)    Parameters below as of 21 Mar 2025 04:57  Patient On (Oxygen Delivery Method): room air          Orthostatic VS    General Exam:   General Appearance: Appropriately dressed and in no acute distress       Head: Normocephalic, atraumatic and no dysmorphic features  Ear, Nose, and Throat: Moist mucous membranes  CVS: S1S2+  Resp: No SOB, no wheeze or rhonchi  GI: soft NT/ND  Extremities: No edema or cyanosis  Skin: No bruises or rashes     Neurological Exam:  Mental Status: Awake, alert and oriented x1-2.  Able to follow simple verbal commands. Able to name and repeat. fluent speech. No obvious aphasia +dysarthria noted.   Cranial Nerves: PERRL, EOMI, VFFC, sensation V1-V3 intact,  no obvious facial asymmetry, equal elevation of palate, scm/trap 5/5, tongue is midline on protrusion. no obvious papilledema on fundoscopic exam. hearing is grossly intact.   Motor: Normal bulk, tone and strength throughout. + EPS and tremor   Sensation: Intact to light touch and pinprick throughout.  .  Coordination: No dysmetria on FNF   Gait: walker baseline     Data/Labs/Imaging which I personally reviewed.     Labs:          LABS:                          11.9   5.97  )-----------( 174      ( 21 Mar 2025 06:56 )             35.5     03-21    135  |  101  |  25[H]  ----------------------------<  88  4.0   |  19[L]  |  1.10    Ca    9.2      21 Mar 2025 06:56  Mg     2.0     03-19    TPro  7.2  /  Alb  3.7  /  TBili  0.4  /  DBili  x   /  AST  101[H]  /  ALT  7[L]  /  AlkPhos  87  03-20    LIVER FUNCTIONS - ( 20 Mar 2025 06:55 )  Alb: 3.7 g/dL / Pro: 7.2 g/dL / ALK PHOS: 87 U/L / ALT: 7 U/L / AST: 101 U/L / GGT: x           PT/INR - ( 19 Mar 2025 12:40 )   PT: 12.1 sec;   INR: 1.05 ratio         PTT - ( 19 Mar 2025 12:40 )  PTT:28.8 sec  Urinalysis Basic - ( 21 Mar 2025 06:56 )    Color: x / Appearance: x / SG: x / pH: x  Gluc: 88 mg/dL / Ketone: x  / Bili: x / Urobili: x   Blood: x / Protein: x / Nitrite: x   Leuk Esterase: x / RBC: x / WBC x   Sq Epi: x / Non Sq Epi: x / Bacteria: x      < from: CT Head No Cont (03.19.25 @ 14:24) >    ACC: [id number] EXAM:  CT CERVICAL SPINE   ORDERED BY:  LALI LIU       ACC: [id number] EXAM:  CT BRAIN   ORDERED BY:  LALI LIU     PROCEDURE DATE:  03/19/2025          INTERPRETATION:  CLINICAL INFORMATION: sp fall    COMPARISON: headCT 5/22/2024    CONTRAST:  IV Contrast: None    TECHNIQUE:    CT BRAIN: Serial axial images were obtained from the skull base to the   vertex using multi-slice helical technique. Sagittal and coronal   reformats were obtained.    CT CERVICAL SPINE: Axial images were obtained of the cervical spine using   multislice helical technique. Reformatted coronal and sagittal images   were obtained.    FINDINGS:    CT BRAIN:    VENTRICLES AND SULCI: Age appropriate involutional changes.  INTRA-AXIAL: No mass effect, acute hemorrhage, or midline shift.  There   are periventricular and subcortical white matter hypodensities,   consistent with microvascular type changes.  EXTRA-AXIAL: No mass or fluid collection. Basal cisterns are normal in   appearance.    VISUALIZED SINUSES:  Scattered mucosal thickening bilateral air-fluid   levels in the maxillary sinuses.  TYMPANOMASTOID CAVITIES:  Right-sided partial mastoidectomy with cochlear   implant. The remaining right mastoid air cells remain chronically  opacified.  VISUALIZED ORBITS: Bilateral lens replacement.  CALVARIUM: Intact.    MISCELLANEOUS: None.      CT CERVICAL SPINE:    VERTEBRAE:  Normal in height. No acute fracture. Multilevel degenerative   changes including marginal osteophytes. Heterogeneous mineralization.  ALIGNMENT: No subluxation or scoliosis.  INTERVERTEBRAL DISC SPACES: No significant disc bulge or focal disc   herniation. No significant spinal canal or neural foraminal stenosis.    VISUALIZED LUNGS: Biapical scarring.    MISCELLANEOUS:  2.4 cm heterogeneous nodule in the right thyroid lobe.      IMPRESSION:    CT HEAD:  No acute intracranial hemorrhage, mass effect, or midline shift.  Suspected sinusitis.  Right-sided cochlear implant.    CT CERVICAL SPINE:  No acutefracture or traumatic subluxation.    Multi-level degenerative changes.        --- End of Report ---            JARRET PADGETT MD; Attending Radiologist  This document has been electronically signed. Mar 19 2025  2:48PM    < end of copied text >  
Patient is a 84y old  Male who presents with a chief complaint of lethargy  and SOB (23 Mar 2025 07:53)    Date of servie : 03-23-25 @ 12:05  INTERVAL HPI/OVERNIGHT EVENTS:  T(C): 36.9 (03-23-25 @ 11:59), Max: 36.9 (03-23-25 @ 11:59)  HR: 90 (03-23-25 @ 11:59) (67 - 90)  BP: 120/64 (03-23-25 @ 11:59) (120/64 - 135/74)  RR: 18 (03-23-25 @ 11:59) (18 - 18)  SpO2: 94% (03-23-25 @ 11:59) (94% - 97%)  Wt(kg): --  I&O's Summary    22 Mar 2025 07:01  -  23 Mar 2025 07:00  --------------------------------------------------------  IN: 240 mL / OUT: 1000 mL / NET: -760 mL        LABS:                        12.3   6.13  )-----------( 178      ( 22 Mar 2025 07:16 )             37.0               CAPILLARY BLOOD GLUCOSE      POCT Blood Glucose.: 158 mg/dL (23 Mar 2025 11:35)  POCT Blood Glucose.: 101 mg/dL (23 Mar 2025 07:27)  POCT Blood Glucose.: 123 mg/dL (22 Mar 2025 16:17)            MEDICATIONS  (STANDING):  acetaminophen   IVPB .. 1000 milliGRAM(s) IV Intermittent once  albuterol/ipratropium for Nebulization 3 milliLiter(s) Nebulizer every 6 hours  artificial  tears Solution 1 Drop(s) Both EYES three times a day  atorvastatin 20 milliGRAM(s) Oral at bedtime  brimonidine 0.2% Ophthalmic Solution 1 Drop(s) Both EYES two times a day  carbidopa/levodopa  25/100 1 Tablet(s) Oral four times a day  cefTRIAXone   IVPB 1000 milliGRAM(s) IV Intermittent every 24 hours  dextrose 5%. 1000 milliLiter(s) (50 mL/Hr) IV Continuous <Continuous>  dextrose 5%. 1000 milliLiter(s) (100 mL/Hr) IV Continuous <Continuous>  dextrose 50% Injectable 25 Gram(s) IV Push once  dextrose 50% Injectable 12.5 Gram(s) IV Push once  dextrose 50% Injectable 25 Gram(s) IV Push once  dorzolamide 2%/timolol 0.5% Ophthalmic Solution 1 Drop(s) Both EYES two times a day  enoxaparin Injectable 40 milliGRAM(s) SubCutaneous every 24 hours  escitalopram 5 milliGRAM(s) Oral daily  fluticasone propionate/ salmeterol 250-50 MICROgram(s) Diskus 1 Dose(s) Inhalation two times a day  gabapentin 100 milliGRAM(s) Oral three times a day  glucagon  Injectable 1 milliGRAM(s) IntraMuscular once  insulin lispro (ADMELOG) corrective regimen sliding scale   SubCutaneous three times a day before meals  latanoprost 0.005% Ophthalmic Solution 1 Drop(s) Both EYES at bedtime  lisinopril 40 milliGRAM(s) Oral daily  oseltamivir 30 milliGRAM(s) Oral two times a day  tamsulosin 0.4 milliGRAM(s) Oral at bedtime  traZODone 150 milliGRAM(s) Oral daily    MEDICATIONS  (PRN):  dextrose Oral Gel 15 Gram(s) Oral once PRN Blood Glucose LESS THAN 70 milliGRAM(s)/deciliter          PHYSICAL EXAM:  GENERAL: NAD, well-groomed, well-developed  HEAD:  Atraumatic, Normocephalic  CHEST/LUNG: Clear to percussion bilaterally; No rales, rhonchi, wheezing, or rubs  HEART: Regular rate and rhythm; No murmurs, rubs, or gallops  ABDOMEN: Soft, Nontender, Nondistended; Bowel sounds present  EXTREMITIES:  2+ Peripheral Pulses, No clubbing, cyanosis, or edema  LYMPH: No lymphadenopathy noted  SKIN: No rashes or lesions    Care Discussed with Consultants/Other Providers [ ] YES  [ ] NO
    Subjective: Patient seen and examined. No new events except as noted.     SUBJECTIVE/ROS:  nad      MEDICATIONS:  MEDICATIONS  (STANDING):  acetaminophen   IVPB .. 1000 milliGRAM(s) IV Intermittent once  albuterol/ipratropium for Nebulization 3 milliLiter(s) Nebulizer every 6 hours  artificial  tears Solution 1 Drop(s) Both EYES three times a day  atorvastatin 20 milliGRAM(s) Oral at bedtime  azithromycin  IVPB 500 milliGRAM(s) IV Intermittent every 24 hours  brimonidine 0.2% Ophthalmic Solution 1 Drop(s) Both EYES two times a day  carbidopa/levodopa  25/100 1 Tablet(s) Oral four times a day  cefTRIAXone   IVPB 1000 milliGRAM(s) IV Intermittent every 24 hours  dextrose 5%. 1000 milliLiter(s) (50 mL/Hr) IV Continuous <Continuous>  dextrose 5%. 1000 milliLiter(s) (100 mL/Hr) IV Continuous <Continuous>  dextrose 50% Injectable 25 Gram(s) IV Push once  dextrose 50% Injectable 12.5 Gram(s) IV Push once  dextrose 50% Injectable 25 Gram(s) IV Push once  dorzolamide 2%/timolol 0.5% Ophthalmic Solution 1 Drop(s) Both EYES two times a day  enoxaparin Injectable 40 milliGRAM(s) SubCutaneous every 24 hours  escitalopram 5 milliGRAM(s) Oral daily  fluticasone propionate/ salmeterol 250-50 MICROgram(s) Diskus 1 Dose(s) Inhalation two times a day  gabapentin 100 milliGRAM(s) Oral three times a day  glucagon  Injectable 1 milliGRAM(s) IntraMuscular once  insulin lispro (ADMELOG) corrective regimen sliding scale   SubCutaneous three times a day before meals  latanoprost 0.005% Ophthalmic Solution 1 Drop(s) Both EYES at bedtime  lisinopril 40 milliGRAM(s) Oral daily  oseltamivir 30 milliGRAM(s) Oral two times a day  tamsulosin 0.4 milliGRAM(s) Oral at bedtime  traZODone 150 milliGRAM(s) Oral daily      PHYSICAL EXAM:  T(C): 36.6 (03-22-25 @ 11:10), Max: 36.9 (03-21-25 @ 20:24)  HR: 76 (03-22-25 @ 11:10) (75 - 77)  BP: 152/70 (03-22-25 @ 11:10) (138/78 - 152/70)  RR: 18 (03-22-25 @ 11:10) (18 - 18)  SpO2: 98% (03-22-25 @ 11:10) (96% - 98%)  Wt(kg): --  I&O's Summary    21 Mar 2025 07:01  -  22 Mar 2025 07:00  --------------------------------------------------------  IN: 240 mL / OUT: 350 mL / NET: -110 mL    22 Mar 2025 07:01  -  22 Mar 2025 13:35  --------------------------------------------------------  IN: 0 mL / OUT: 600 mL / NET: -600 mL            JVP: Normal  Neck: supple  Lung: clear   CV: S1 S2 ,  Abd: soft  Ext: No edema  neuro: Awake / alert  Psych: flat affect  Skin: normal``    LABS/DATA:    CARDIAC MARKERS:                                12.3   6.13  )-----------( 178      ( 22 Mar 2025 07:16 )             37.0     03-21    135  |  101  |  25[H]  ----------------------------<  88  4.0   |  19[L]  |  1.10    Ca    9.2      21 Mar 2025 06:56      proBNP:   Lipid Profile:   HgA1c:   TSH:             
Date of Service: 03-21-25 @ 15:04    Patient is a 84y old  Male who presents with a chief complaint of lethargy  and SOB (21 Mar 2025 11:08)      Any change in ROS: he looks better:   on room air:   says breathing is OK     MEDICATIONS  (STANDING):  acetaminophen   IVPB .. 1000 milliGRAM(s) IV Intermittent once  albuterol/ipratropium for Nebulization 3 milliLiter(s) Nebulizer every 6 hours  artificial  tears Solution 1 Drop(s) Both EYES three times a day  atorvastatin 20 milliGRAM(s) Oral at bedtime  azithromycin  IVPB 500 milliGRAM(s) IV Intermittent every 24 hours  brimonidine 0.2% Ophthalmic Solution 1 Drop(s) Both EYES two times a day  carbidopa/levodopa  25/100 1 Tablet(s) Oral four times a day  cefTRIAXone   IVPB 1000 milliGRAM(s) IV Intermittent every 24 hours  dextrose 5%. 1000 milliLiter(s) (50 mL/Hr) IV Continuous <Continuous>  dextrose 5%. 1000 milliLiter(s) (100 mL/Hr) IV Continuous <Continuous>  dextrose 50% Injectable 25 Gram(s) IV Push once  dextrose 50% Injectable 12.5 Gram(s) IV Push once  dextrose 50% Injectable 25 Gram(s) IV Push once  dorzolamide 2%/timolol 0.5% Ophthalmic Solution 1 Drop(s) Both EYES two times a day  enoxaparin Injectable 40 milliGRAM(s) SubCutaneous every 24 hours  escitalopram 5 milliGRAM(s) Oral daily  gabapentin 100 milliGRAM(s) Oral three times a day  glucagon  Injectable 1 milliGRAM(s) IntraMuscular once  insulin lispro (ADMELOG) corrective regimen sliding scale   SubCutaneous three times a day before meals  latanoprost 0.005% Ophthalmic Solution 1 Drop(s) Both EYES at bedtime  lisinopril 40 milliGRAM(s) Oral daily  oseltamivir 30 milliGRAM(s) Oral two times a day  tamsulosin 0.4 milliGRAM(s) Oral at bedtime  traZODone 150 milliGRAM(s) Oral daily    MEDICATIONS  (PRN):  dextrose Oral Gel 15 Gram(s) Oral once PRN Blood Glucose LESS THAN 70 milliGRAM(s)/deciliter    Vital Signs Last 24 Hrs  T(C): 37.3 (21 Mar 2025 11:16), Max: 37.3 (21 Mar 2025 11:16)  T(F): 99.1 (21 Mar 2025 11:16), Max: 99.1 (21 Mar 2025 11:16)  HR: 75 (21 Mar 2025 11:16) (73 - 80)  BP: 159/68 (21 Mar 2025 11:16) (120/67 - 159/68)  BP(mean): --  RR: 18 (21 Mar 2025 11:16) (18 - 18)  SpO2: 97% (21 Mar 2025 11:16) (93% - 97%)    Parameters below as of 21 Mar 2025 11:16  Patient On (Oxygen Delivery Method): room air        I&O's Summary    20 Mar 2025 07:01  -  21 Mar 2025 07:00  --------------------------------------------------------  IN: 360 mL / OUT: 325 mL / NET: 35 mL          Physical Exam:   GENERAL: NAD, well-groomed, well-developed  HEENT: RAYMOND/   Atraumatic, Normocephalic  ENMT: No tonsillar erythema, exudates, or enlargement; Moist mucous membranes, Good dentition, No lesions  NECK: Supple, No JVD, Normal thyroid  CHEST/LUNG: Clear to auscultaion  CVS: Regular rate and rhythm; No murmurs, rubs, or gallops  GI: : Soft, Nontender, Nondistended; Bowel sounds present  NERVOUS SYSTEM:  Alert & Oriented X3  EXTREMITIES: -r edema  LYMPH: No lymphadenopathy noted  SKIN: No rashes or lesions  ENDOCRINOLOGY: No Thyromegaly  PSYCH: calm     Labs:  22                            11.9   5.97  )-----------( 174      ( 21 Mar 2025 06:56 )             35.5                         12.1   5.87  )-----------( 157      ( 20 Mar 2025 06:55 )             36.7                         12.0   5.85  )-----------( 163      ( 19 Mar 2025 12:40 )             35.3     03-21    135  |  101  |  25[H]  ----------------------------<  88  4.0   |  19[L]  |  1.10  03-20    136  |  102  |  23  ----------------------------<  83  3.8   |  19[L]  |  1.09  03-19    137  |  101  |  28[H]  ----------------------------<  102[H]  3.7   |  19[L]  |  1.28    Ca    9.2      21 Mar 2025 06:56  Ca    9.2      20 Mar 2025 06:55    TPro  7.2  /  Alb  3.7  /  TBili  0.4  /  DBili  x   /  AST  101[H]  /  ALT  7[L]  /  AlkPhos  87  03-20  TPro  7.6  /  Alb  3.9  /  TBili  0.4  /  DBili  x   /  AST  46[H]  /  ALT  9[L]  /  AlkPhos  97  03-19    CAPILLARY BLOOD GLUCOSE      POCT Blood Glucose.: 103 mg/dL (21 Mar 2025 11:11)  POCT Blood Glucose.: 102 mg/dL (21 Mar 2025 07:27)  POCT Blood Glucose.: 153 mg/dL (20 Mar 2025 21:27)  POCT Blood Glucose.: 133 mg/dL (20 Mar 2025 16:28)      LIVER FUNCTIONS - ( 20 Mar 2025 06:55 )  Alb: 3.7 g/dL / Pro: 7.2 g/dL / ALK PHOS: 87 U/L / ALT: 7 U/L / AST: 101 U/L / GGT: x             Urinalysis Basic - ( 21 Mar 2025 06:56 )    Color: x / Appearance: x / SG: x / pH: x  Gluc: 88 mg/dL / Ketone: x  / Bili: x / Urobili: x   Blood: x / Protein: x / Nitrite: x   Leuk Esterase: x / RBC: x / WBC x   Sq Epi: x / Non Sq Epi: x / Bacteria: x            RECENT CULTURES:  03-19 @ 12:15 Blood Blood-Peripheral                No growth at 24 hours    03-19 @ 12:00 Blood Blood-Peripheral       rad< from: CT Chest No Cont (03.20.25 @ 18:40) >  Mild/moderate biapical consolidative airspace opacities with focal   bronchial dilatation, right greater than left.    There is mild bilateral lower lobe airspace infiltrate and/or   subsegmental atelectasis. No sara consolidation. Mild bilateral lower   lobe bronchiectasis.    Cholelithiasis. 1.3 cmgallstone region of gallbladder neck. No   pericholecystic inflammatory changes.    Please refer to detailed findings otherwise described above.    < end of copied text >  < from: CT Chest No Cont (03.20.25 @ 18:40) >  Mild/moderate biapical consolidative airspace opacities with focal   bronchial dilatation, right greater than left.    There is mild bilateral lower lobe airspace infiltrate and/or   subsegmental atelectasis. No sara consolidation. Mild bilateral lower   lobe bronchiectasis.    Cholelithiasis. 1.3 cmgallstone region of gallbladder neck. No   pericholecystic inflammatory changes.    Please refer to detailed findings otherwise described above.    < end of copied text >           No growth at 24 hours          RESPIRATORY CULTURES:          Studies  Chest X-RAY  CT SCAN Chest   Venous Dopplers: LE:   CT Abdomen  Others              
[facility]  [facility] Wilder Y. Patel, S. Shah, G. Casimir  [phone]  ([phone] - weekdays after 5pm and weekends)    Name: JENNIFER CANTOR  Age/Gender: 84y Male  MRN: [id number]    Interval History:  Patient seen and examined this morning.   Resting comfortably.   Notes reviewed  No concerning overnight events  Afebrile   Allergies: No Known Allergies      Objective:  Vitals:   T(F): 98.8 (03-21-25 @ 04:57), Max: 98.8 (03-21-25 @ 04:57)  HR: 73 (03-21-25 @ 04:57) (73 - 80)  BP: 120/67 (03-21-25 @ 04:57) (120/67 - 131/75)  RR: 18 (03-21-25 @ 04:57) (18 - 18)  SpO2: 93% (03-21-25 @ 04:57) (91% - 93%)  Physical Examination:  General: no acute distress  HEENT: normocephalic, atraumatic, anicteric  Respiratory: decreased breath sounds b/l   Cardiovascular: S1 and S2 present, normal rate   Gastrointestinal: normal appearing, nondistended  Extremities: no edema, no cyanosis  Skin: no visible rash    Laboratory Studies:  CBC:                       11.9   5.97  )-----------( 174      ( 21 Mar 2025 06:56 )             35.5     WBC Trend:  5.97 03-21-25 @ 06:56  5.87 03-20-25 @ 06:55  5.85 03-19-25 @ 12:40    CMP: 03-21    135  |  101  |  25[H]  ----------------------------<  88  4.0   |  19[L]  |  1.10    Ca    9.2      21 Mar 2025 06:56  Mg     2.0     03-19    TPro  7.2  /  Alb  3.7  /  TBili  0.4  /  DBili  x   /  AST  101[H]  /  ALT  7[L]  /  AlkPhos  87  03-20    Creatinine: 1.10 mg/dL (03-21-25 @ 06:56)  Creatinine: 1.09 mg/dL (03-20-25 @ 06:55)  Creatinine: 1.28 mg/dL (03-19-25 @ 12:40)      LIVER FUNCTIONS - ( 20 Mar 2025 06:55 )  Alb: 3.7 g/dL / Pro: 7.2 g/dL / ALK PHOS: 87 U/L / ALT: 7 U/L / AST: 101 U/L / GGT: x           Microbiology: reviewed   Culture - Blood (collected 03-19-25 @ 12:15)  Source: Blood Blood-Peripheral  Preliminary Report (03-20-25 @ 17:02):    No growth at 24 hours    Culture - Blood (collected 03-19-25 @ 12:00)  Source: Blood Blood-Peripheral  Preliminary Report (03-20-25 @ 17:02):    No growth at 24 hours    03-19-25 @ 12:38 SARS-CoV-2 NotDetec/Influenza A Detected/Influenza B NotDetec/RSV NotDetec    Radiology: reviewed     Medications:  acetaminophen   IVPB .. 1000 milliGRAM(s) IV Intermittent once  albuterol/ipratropium for Nebulization 3 milliLiter(s) Nebulizer every 6 hours  artificial  tears Solution 1 Drop(s) Both EYES three times a day  atorvastatin 20 milliGRAM(s) Oral at bedtime  azithromycin  IVPB 500 milliGRAM(s) IV Intermittent every 24 hours  brimonidine 0.2% Ophthalmic Solution 1 Drop(s) Both EYES two times a day  carbidopa/levodopa  25/100 1 Tablet(s) Oral four times a day  cefTRIAXone   IVPB 1000 milliGRAM(s) IV Intermittent every 24 hours  dextrose 5%. 1000 milliLiter(s) IV Continuous <Continuous>  dextrose 5%. 1000 milliLiter(s) IV Continuous <Continuous>  dextrose 50% Injectable 25 Gram(s) IV Push once  dextrose 50% Injectable 12.5 Gram(s) IV Push once  dextrose 50% Injectable 25 Gram(s) IV Push once  dextrose Oral Gel 15 Gram(s) Oral once PRN  dorzolamide 2%/timolol 0.5% Ophthalmic Solution 1 Drop(s) Both EYES two times a day  enoxaparin Injectable 40 milliGRAM(s) SubCutaneous every 24 hours  escitalopram 5 milliGRAM(s) Oral daily  gabapentin 100 milliGRAM(s) Oral three times a day  glucagon  Injectable 1 milliGRAM(s) IntraMuscular once  insulin lispro (ADMELOG) corrective regimen sliding scale   SubCutaneous three times a day before meals  latanoprost 0.005% Ophthalmic Solution 1 Drop(s) Both EYES at bedtime  lisinopril 40 milliGRAM(s) Oral daily  oseltamivir 30 milliGRAM(s) Oral two times a day  tamsulosin 0.4 milliGRAM(s) Oral at bedtime  traZODone 150 milliGRAM(s) Oral daily    Current Antimicrobials:  azithromycin  IVPB 500 milliGRAM(s) IV Intermittent every 24 hours  cefTRIAXone   IVPB 1000 milliGRAM(s) IV Intermittent every 24 hours  oseltamivir 30 milliGRAM(s) Oral two times a day    Prior/Completed Antimicrobials:  azithromycin  IVPB  cefTRIAXone   IVPB  oseltamivir  
Neurology      S: patient seen. better      Medications: MEDICATIONS  (STANDING):  acetaminophen   IVPB .. 1000 milliGRAM(s) IV Intermittent once  albuterol/ipratropium for Nebulization 3 milliLiter(s) Nebulizer every 6 hours  artificial  tears Solution 1 Drop(s) Both EYES three times a day  atorvastatin 20 milliGRAM(s) Oral at bedtime  brimonidine 0.2% Ophthalmic Solution 1 Drop(s) Both EYES two times a day  carbidopa/levodopa  25/100 1 Tablet(s) Oral four times a day  dextrose 5%. 1000 milliLiter(s) (100 mL/Hr) IV Continuous <Continuous>  dextrose 5%. 1000 milliLiter(s) (50 mL/Hr) IV Continuous <Continuous>  dextrose 50% Injectable 25 Gram(s) IV Push once  dextrose 50% Injectable 12.5 Gram(s) IV Push once  dextrose 50% Injectable 25 Gram(s) IV Push once  dorzolamide 2%/timolol 0.5% Ophthalmic Solution 1 Drop(s) Both EYES two times a day  enoxaparin Injectable 40 milliGRAM(s) SubCutaneous every 24 hours  escitalopram 5 milliGRAM(s) Oral daily  fluticasone propionate/ salmeterol 250-50 MICROgram(s) Diskus 1 Dose(s) Inhalation two times a day  gabapentin 100 milliGRAM(s) Oral three times a day  glucagon  Injectable 1 milliGRAM(s) IntraMuscular once  insulin lispro (ADMELOG) corrective regimen sliding scale   SubCutaneous three times a day before meals  latanoprost 0.005% Ophthalmic Solution 1 Drop(s) Both EYES at bedtime  lisinopril 40 milliGRAM(s) Oral daily  tamsulosin 0.4 milliGRAM(s) Oral at bedtime  traZODone 150 milliGRAM(s) Oral daily    MEDICATIONS  (PRN):  dextrose Oral Gel 15 Gram(s) Oral once PRN Blood Glucose LESS THAN 70 milliGRAM(s)/deciliter       Vitals:  Vital Signs Last 24 Hrs  T(C): 36.8 (24 Mar 2025 05:00), Max: 36.9 (23 Mar 2025 11:59)  T(F): 98.3 (24 Mar 2025 05:00), Max: 98.5 (23 Mar 2025 11:59)  HR: 63 (24 Mar 2025 05:00) (60 - 90)  BP: 122/65 (24 Mar 2025 05:00) (108/61 - 160/84)  BP(mean): --  RR: 18 (24 Mar 2025 05:00) (18 - 18)  SpO2: 97% (24 Mar 2025 05:00) (94% - 97%)    Parameters below as of 24 Mar 2025 05:00  Patient On (Oxygen Delivery Method): room air                 Orthostatic VS    General Exam:   General Appearance: Appropriately dressed and in no acute distress       Head: Normocephalic, atraumatic and no dysmorphic features  Ear, Nose, and Throat: Moist mucous membranes  CVS: S1S2+  Resp: No SOB, no wheeze or rhonchi  GI: soft NT/ND  Extremities: No edema or cyanosis  Skin: No bruises or rashes     Neurological Exam:  Mental Status: Awake, alert and oriented x1-2.  Able to follow simple verbal commands. Able to name and repeat. fluent speech. No obvious aphasia +dysarthria noted.   Cranial Nerves: PERRL, EOMI, VFFC, sensation V1-V3 intact,  no obvious facial asymmetry, equal elevation of palate, scm/trap 5/5, tongue is midline on protrusion. no obvious papilledema on fundoscopic exam. hearing is grossly intact.   Motor: Normal bulk, tone and strength throughout. + EPS and tremor   Sensation: Intact to light touch and pinprick throughout.  .  Coordination: No dysmetria on FNF   Gait: walker baseline     Data/Labs/Imaging which I personally reviewed.        LABS:   LABS:    no new labs                       < from: CT Head No Cont (03.19.25 @ 14:24) >    ACC: [id number] EXAM:  CT CERVICAL SPINE   ORDERED BY:  LALI LIU       ACC: [id number] EXAM:  CT BRAIN   ORDERED BY:  LALI LIU     PROCEDURE DATE:  03/19/2025          INTERPRETATION:  CLINICAL INFORMATION: sp fall    COMPARISON: headCT 5/22/2024    CONTRAST:  IV Contrast: None    TECHNIQUE:    CT BRAIN: Serial axial images were obtained from the skull base to the   vertex using multi-slice helical technique. Sagittal and coronal   reformats were obtained.    CT CERVICAL SPINE: Axial images were obtained of the cervical spine using   multislice helical technique. Reformatted coronal and sagittal images   were obtained.    FINDINGS:    CT BRAIN:    VENTRICLES AND SULCI: Age appropriate involutional changes.  INTRA-AXIAL: No mass effect, acute hemorrhage, or midline shift.  There   are periventricular and subcortical white matter hypodensities,   consistent with microvascular type changes.  EXTRA-AXIAL: No mass or fluid collection. Basal cisterns are normal in   appearance.    VISUALIZED SINUSES:  Scattered mucosal thickening bilateral air-fluid   levels in the maxillary sinuses.  TYMPANOMASTOID CAVITIES:  Right-sided partial mastoidectomy with cochlear   implant. The remaining right mastoid air cells remain chronically  opacified.  VISUALIZED ORBITS: Bilateral lens replacement.  CALVARIUM: Intact.    MISCELLANEOUS: None.      CT CERVICAL SPINE:    VERTEBRAE:  Normal in height. No acute fracture. Multilevel degenerative   changes including marginal osteophytes. Heterogeneous mineralization.  ALIGNMENT: No subluxation or scoliosis.  INTERVERTEBRAL DISC SPACES: No significant disc bulge or focal disc   herniation. No significant spinal canal or neural foraminal stenosis.    VISUALIZED LUNGS: Biapical scarring.    MISCELLANEOUS:  2.4 cm heterogeneous nodule in the right thyroid lobe.      IMPRESSION:    CT HEAD:  No acute intracranial hemorrhage, mass effect, or midline shift.  Suspected sinusitis.  Right-sided cochlear implant.    CT CERVICAL SPINE:  No acutefracture or traumatic subluxation.    Multi-level degenerative changes.        --- End of Report ---            JARRET PADGETT MD; Attending Radiologist  This document has been electronically signed. Mar 19 2025  2:48PM    < end of copied text >  
Neurology      S: patient seen. better     Medications: MEDICATIONS  (STANDING):  acetaminophen   IVPB .. 1000 milliGRAM(s) IV Intermittent once  albuterol/ipratropium for Nebulization 3 milliLiter(s) Nebulizer every 6 hours  artificial  tears Solution 1 Drop(s) Both EYES three times a day  atorvastatin 20 milliGRAM(s) Oral at bedtime  brimonidine 0.2% Ophthalmic Solution 1 Drop(s) Both EYES two times a day  carbidopa/levodopa  25/100 1 Tablet(s) Oral four times a day  dextrose 5%. 1000 milliLiter(s) (100 mL/Hr) IV Continuous <Continuous>  dextrose 5%. 1000 milliLiter(s) (50 mL/Hr) IV Continuous <Continuous>  dextrose 50% Injectable 25 Gram(s) IV Push once  dextrose 50% Injectable 12.5 Gram(s) IV Push once  dextrose 50% Injectable 25 Gram(s) IV Push once  dorzolamide 2%/timolol 0.5% Ophthalmic Solution 1 Drop(s) Both EYES two times a day  enoxaparin Injectable 40 milliGRAM(s) SubCutaneous every 24 hours  escitalopram 5 milliGRAM(s) Oral daily  fluticasone propionate/ salmeterol 250-50 MICROgram(s) Diskus 1 Dose(s) Inhalation two times a day  gabapentin 100 milliGRAM(s) Oral three times a day  glucagon  Injectable 1 milliGRAM(s) IntraMuscular once  insulin lispro (ADMELOG) corrective regimen sliding scale   SubCutaneous three times a day before meals  latanoprost 0.005% Ophthalmic Solution 1 Drop(s) Both EYES at bedtime  lisinopril 40 milliGRAM(s) Oral daily  oseltamivir 30 milliGRAM(s) Oral two times a day  tamsulosin 0.4 milliGRAM(s) Oral at bedtime  traZODone 150 milliGRAM(s) Oral daily    MEDICATIONS  (PRN):  dextrose Oral Gel 15 Gram(s) Oral once PRN Blood Glucose LESS THAN 70 milliGRAM(s)/deciliter       Vitals:  Vital Signs Last 24 Hrs  T(C): 36.4 (23 Mar 2025 21:17), Max: 36.9 (23 Mar 2025 11:59)  T(F): 97.6 (23 Mar 2025 21:17), Max: 98.5 (23 Mar 2025 11:59)  HR: 60 (23 Mar 2025 21:17) (60 - 90)  BP: 133/71 (23 Mar 2025 21:17) (108/61 - 133/71)  BP(mean): --  RR: 18 (23 Mar 2025 21:17) (18 - 18)  SpO2: 97% (23 Mar 2025 21:17) (94% - 97%)    Parameters below as of 23 Mar 2025 21:17  Patient On (Oxygen Delivery Method): room air              Orthostatic VS    General Exam:   General Appearance: Appropriately dressed and in no acute distress       Head: Normocephalic, atraumatic and no dysmorphic features  Ear, Nose, and Throat: Moist mucous membranes  CVS: S1S2+  Resp: No SOB, no wheeze or rhonchi  GI: soft NT/ND  Extremities: No edema or cyanosis  Skin: No bruises or rashes     Neurological Exam:  Mental Status: Awake, alert and oriented x1-2.  Able to follow simple verbal commands. Able to name and repeat. fluent speech. No obvious aphasia +dysarthria noted.   Cranial Nerves: PERRL, EOMI, VFFC, sensation V1-V3 intact,  no obvious facial asymmetry, equal elevation of palate, scm/trap 5/5, tongue is midline on protrusion. no obvious papilledema on fundoscopic exam. hearing is grossly intact.   Motor: Normal bulk, tone and strength throughout. + EPS and tremor   Sensation: Intact to light touch and pinprick throughout.  .  Coordination: No dysmetria on FNF   Gait: walker baseline     Data/Labs/Imaging which I personally reviewed.        LABS:                          12.3   6.13  )-----------( 178      ( 22 Mar 2025 07:16 )             37.0                       < from: CT Head No Cont (03.19.25 @ 14:24) >    ACC: [id number] EXAM:  CT CERVICAL SPINE   ORDERED BY:  LALI LIU       ACC: [id number] EXAM:  CT BRAIN   ORDERED BY:  LALI LIU     PROCEDURE DATE:  03/19/2025          INTERPRETATION:  CLINICAL INFORMATION: sp fall    COMPARISON: headCT 5/22/2024    CONTRAST:  IV Contrast: None    TECHNIQUE:    CT BRAIN: Serial axial images were obtained from the skull base to the   vertex using multi-slice helical technique. Sagittal and coronal   reformats were obtained.    CT CERVICAL SPINE: Axial images were obtained of the cervical spine using   multislice helical technique. Reformatted coronal and sagittal images   were obtained.    FINDINGS:    CT BRAIN:    VENTRICLES AND SULCI: Age appropriate involutional changes.  INTRA-AXIAL: No mass effect, acute hemorrhage, or midline shift.  There   are periventricular and subcortical white matter hypodensities,   consistent with microvascular type changes.  EXTRA-AXIAL: No mass or fluid collection. Basal cisterns are normal in   appearance.    VISUALIZED SINUSES:  Scattered mucosal thickening bilateral air-fluid   levels in the maxillary sinuses.  TYMPANOMASTOID CAVITIES:  Right-sided partial mastoidectomy with cochlear   implant. The remaining right mastoid air cells remain chronically  opacified.  VISUALIZED ORBITS: Bilateral lens replacement.  CALVARIUM: Intact.    MISCELLANEOUS: None.      CT CERVICAL SPINE:    VERTEBRAE:  Normal in height. No acute fracture. Multilevel degenerative   changes including marginal osteophytes. Heterogeneous mineralization.  ALIGNMENT: No subluxation or scoliosis.  INTERVERTEBRAL DISC SPACES: No significant disc bulge or focal disc   herniation. No significant spinal canal or neural foraminal stenosis.    VISUALIZED LUNGS: Biapical scarring.    MISCELLANEOUS:  2.4 cm heterogeneous nodule in the right thyroid lobe.      IMPRESSION:    CT HEAD:  No acute intracranial hemorrhage, mass effect, or midline shift.  Suspected sinusitis.  Right-sided cochlear implant.    CT CERVICAL SPINE:  No acutefracture or traumatic subluxation.    Multi-level degenerative changes.        --- End of Report ---            JARRET PADGETT MD; Attending Radiologist  This document has been electronically signed. Mar 19 2025  2:48PM    < end of copied text >  
[facility]  CA[facility]O Wilder Y. Patel, S. Shah, [facility]  [phone]    Name: JENNIFER CANTOR  Age: 84y  Gender: Male  MRN: [id number]    Interval History:  No acute overnight events.   Notes reviewed    Antibiotics:  azithromycin  IVPB 500 milliGRAM(s) IV Intermittent every 24 hours  cefTRIAXone   IVPB 1000 milliGRAM(s) IV Intermittent every 24 hours  oseltamivir 30 milliGRAM(s) Oral two times a day      Medications:  acetaminophen   IVPB .. 1000 milliGRAM(s) IV Intermittent once  albuterol/ipratropium for Nebulization 3 milliLiter(s) Nebulizer every 6 hours  artificial  tears Solution 1 Drop(s) Both EYES three times a day  atorvastatin 20 milliGRAM(s) Oral at bedtime  azithromycin  IVPB 500 milliGRAM(s) IV Intermittent every 24 hours  brimonidine 0.2% Ophthalmic Solution 1 Drop(s) Both EYES two times a day  carbidopa/levodopa  25/100 1 Tablet(s) Oral four times a day  cefTRIAXone   IVPB 1000 milliGRAM(s) IV Intermittent every 24 hours  dextrose 5%. 1000 milliLiter(s) IV Continuous <Continuous>  dextrose 5%. 1000 milliLiter(s) IV Continuous <Continuous>  dextrose 50% Injectable 25 Gram(s) IV Push once  dextrose 50% Injectable 12.5 Gram(s) IV Push once  dextrose 50% Injectable 25 Gram(s) IV Push once  dextrose Oral Gel 15 Gram(s) Oral once PRN  dorzolamide 2%/timolol 0.5% Ophthalmic Solution 1 Drop(s) Both EYES two times a day  enoxaparin Injectable 40 milliGRAM(s) SubCutaneous every 24 hours  escitalopram 5 milliGRAM(s) Oral daily  fluticasone propionate/ salmeterol 250-50 MICROgram(s) Diskus 1 Dose(s) Inhalation two times a day  gabapentin 100 milliGRAM(s) Oral three times a day  glucagon  Injectable 1 milliGRAM(s) IntraMuscular once  insulin lispro (ADMELOG) corrective regimen sliding scale   SubCutaneous three times a day before meals  latanoprost 0.005% Ophthalmic Solution 1 Drop(s) Both EYES at bedtime  lisinopril 40 milliGRAM(s) Oral daily  oseltamivir 30 milliGRAM(s) Oral two times a day  tamsulosin 0.4 milliGRAM(s) Oral at bedtime  traZODone 150 milliGRAM(s) Oral daily      Review of Systems:  unable to obtain    Allergies: No Known Allergies    For details regarding the patient's past medical history, social history, family history, and other miscellaneous elements, please refer the initial infectious diseases consultation and/or the admitting history and physical examination for this admission.    Objective:  Vitals:   T(C): 36.6 (03-22-25 @ 11:10), Max: 36.9 (03-21-25 @ 20:24)  HR: 76 (03-22-25 @ 11:10) (75 - 77)  BP: 152/70 (03-22-25 @ 11:10) (138/78 - 152/70)  RR: 18 (03-22-25 @ 11:10) (18 - 18)  SpO2: 98% (03-22-25 @ 11:10) (96% - 98%)    Physical Examination:  General: no acute distress  HEENT: normocephalic, atraumatic, anicteric  Respiratory: decreased breath sounds b/l   Cardiovascular: S1 and S2 present, normal rate   Gastrointestinal: normal appearing, nondistended  Extremities: no edema, no cyanosis  Skin: no visible rash    Laboratory Studies:  CBC:                       12.3   6.13  )-----------( 178      ( 22 Mar 2025 07:16 )             37.0     CMP: 03-21    135  |  101  |  25[H]  ----------------------------<  88  4.0   |  19[L]  |  1.10    Ca    9.2      21 Mar 2025 06:56        Urinalysis Basic - ( 21 Mar 2025 06:56 )    Color: x / Appearance: x / SG: x / pH: x  Gluc: 88 mg/dL / Ketone: x  / Bili: x / Urobili: x   Blood: x / Protein: x / Nitrite: x   Leuk Esterase: x / RBC: x / WBC x   Sq Epi: x / Non Sq Epi: x / Bacteria: x        Microbiology: reviewed    Culture - Urine (collected 03-19-25 @ 16:14)  Source: Clean Catch Clean Catch (Midstream)  Final Report (03-21-25 @ 15:07):    Normal Urogenital pita present    Culture - Blood (collected 03-19-25 @ 12:15)  Source: Blood Blood-Peripheral  Preliminary Report (03-21-25 @ 17:02):    No growth at 48 Hours    Culture - Blood (collected 03-19-25 @ 12:00)  Source: Blood Blood-Peripheral  Preliminary Report (03-21-25 @ 17:02):    No growth at 48 Hours          Radiology: reviewed      
    Subjective: Patient seen and examined. No new events except as noted.     SUBJECTIVE/ROS:  nad      MEDICATIONS:  MEDICATIONS  (STANDING):  acetaminophen   IVPB .. 1000 milliGRAM(s) IV Intermittent once  albuterol/ipratropium for Nebulization 3 milliLiter(s) Nebulizer every 6 hours  artificial  tears Solution 1 Drop(s) Both EYES three times a day  atorvastatin 20 milliGRAM(s) Oral at bedtime  azithromycin  IVPB 500 milliGRAM(s) IV Intermittent every 24 hours  brimonidine 0.2% Ophthalmic Solution 1 Drop(s) Both EYES two times a day  carbidopa/levodopa  25/100 1 Tablet(s) Oral four times a day  cefTRIAXone   IVPB 1000 milliGRAM(s) IV Intermittent every 24 hours  dextrose 5%. 1000 milliLiter(s) (50 mL/Hr) IV Continuous <Continuous>  dextrose 5%. 1000 milliLiter(s) (100 mL/Hr) IV Continuous <Continuous>  dextrose 50% Injectable 25 Gram(s) IV Push once  dextrose 50% Injectable 12.5 Gram(s) IV Push once  dextrose 50% Injectable 25 Gram(s) IV Push once  dorzolamide 2%/timolol 0.5% Ophthalmic Solution 1 Drop(s) Both EYES two times a day  enoxaparin Injectable 40 milliGRAM(s) SubCutaneous every 24 hours  escitalopram 5 milliGRAM(s) Oral daily  gabapentin 100 milliGRAM(s) Oral three times a day  glucagon  Injectable 1 milliGRAM(s) IntraMuscular once  insulin lispro (ADMELOG) corrective regimen sliding scale   SubCutaneous three times a day before meals  latanoprost 0.005% Ophthalmic Solution 1 Drop(s) Both EYES at bedtime  lisinopril 40 milliGRAM(s) Oral daily  oseltamivir 30 milliGRAM(s) Oral two times a day  tamsulosin 0.4 milliGRAM(s) Oral at bedtime  traZODone 150 milliGRAM(s) Oral daily      PHYSICAL EXAM:  T(C): 37.1 (03-21-25 @ 04:57), Max: 37.1 (03-21-25 @ 04:57)  HR: 73 (03-21-25 @ 04:57) (73 - 80)  BP: 120/67 (03-21-25 @ 04:57) (120/67 - 131/75)  RR: 18 (03-21-25 @ 04:57) (18 - 18)  SpO2: 93% (03-21-25 @ 04:57) (91% - 93%)  Wt(kg): --  I&O's Summary    20 Mar 2025 07:01  -  21 Mar 2025 07:00  --------------------------------------------------------  IN: 360 mL / OUT: 325 mL / NET: 35 mL            JVP: Normal  Neck: supple  Lung: clear   CV: S1 S2 ,  Abd: soft  Ext: No edema  neuro: Awake / alert  Psych: flat affect  Skin: normal``    LABS/DATA:    CARDIAC MARKERS:                                11.9   5.97  )-----------( 174      ( 21 Mar 2025 06:56 )             35.5     03-21    135  |  101  |  25[H]  ----------------------------<  88  4.0   |  19[L]  |  1.10    Ca    9.2      21 Mar 2025 06:56  Mg     2.0     03-19    TPro  7.2  /  Alb  3.7  /  TBili  0.4  /  DBili  x   /  AST  101[H]  /  ALT  7[L]  /  AlkPhos  87  03-20    proBNP:   Lipid Profile:   HgA1c:   TSH:             
Date of Service: 03-23-25 @ 14:20    Patient is a 84y old  Male who presents with a chief complaint of lethargy  and SOB (23 Mar 2025 12:05)      Any change in ROS:he is doing well :"  sitting in chair:  no sob:  no wheezing:  on room air      MEDICATIONS  (STANDING):  acetaminophen   IVPB .. 1000 milliGRAM(s) IV Intermittent once  albuterol/ipratropium for Nebulization 3 milliLiter(s) Nebulizer every 6 hours  artificial  tears Solution 1 Drop(s) Both EYES three times a day  atorvastatin 20 milliGRAM(s) Oral at bedtime  brimonidine 0.2% Ophthalmic Solution 1 Drop(s) Both EYES two times a day  carbidopa/levodopa  25/100 1 Tablet(s) Oral four times a day  cefTRIAXone   IVPB 1000 milliGRAM(s) IV Intermittent every 24 hours  dextrose 5%. 1000 milliLiter(s) (100 mL/Hr) IV Continuous <Continuous>  dextrose 5%. 1000 milliLiter(s) (50 mL/Hr) IV Continuous <Continuous>  dextrose 50% Injectable 25 Gram(s) IV Push once  dextrose 50% Injectable 12.5 Gram(s) IV Push once  dextrose 50% Injectable 25 Gram(s) IV Push once  dorzolamide 2%/timolol 0.5% Ophthalmic Solution 1 Drop(s) Both EYES two times a day  enoxaparin Injectable 40 milliGRAM(s) SubCutaneous every 24 hours  escitalopram 5 milliGRAM(s) Oral daily  fluticasone propionate/ salmeterol 250-50 MICROgram(s) Diskus 1 Dose(s) Inhalation two times a day  gabapentin 100 milliGRAM(s) Oral three times a day  glucagon  Injectable 1 milliGRAM(s) IntraMuscular once  insulin lispro (ADMELOG) corrective regimen sliding scale   SubCutaneous three times a day before meals  latanoprost 0.005% Ophthalmic Solution 1 Drop(s) Both EYES at bedtime  lisinopril 40 milliGRAM(s) Oral daily  oseltamivir 30 milliGRAM(s) Oral two times a day  tamsulosin 0.4 milliGRAM(s) Oral at bedtime  traZODone 150 milliGRAM(s) Oral daily    MEDICATIONS  (PRN):  dextrose Oral Gel 15 Gram(s) Oral once PRN Blood Glucose LESS THAN 70 milliGRAM(s)/deciliter    Vital Signs Last 24 Hrs  T(C): 36.9 (23 Mar 2025 11:59), Max: 36.9 (23 Mar 2025 11:59)  T(F): 98.5 (23 Mar 2025 11:59), Max: 98.5 (23 Mar 2025 11:59)  HR: 69 (23 Mar 2025 12:35) (67 - 90)  BP: 108/61 (23 Mar 2025 12:35) (108/61 - 135/74)  BP(mean): --  RR: 18 (23 Mar 2025 11:59) (18 - 18)  SpO2: 95% (23 Mar 2025 12:35) (94% - 97%)    Parameters below as of 23 Mar 2025 12:35  Patient On (Oxygen Delivery Method): room air        I&O's Summary    22 Mar 2025 07:01  -  23 Mar 2025 07:00  --------------------------------------------------------  IN: 240 mL / OUT: 1000 mL / NET: -760 mL          Physical Exam:   GENERAL: NAD, well-groomed, well-developed  HEENT: RAYMOND/   Atraumatic, Normocephalic  ENMT: No tonsillar erythema, exudates, or enlargement; Moist mucous membranes, Good dentition, No lesions  NECK: Supple, No JVD, Normal thyroid  CHEST/LUNG: no wheezing  CVS: Regular rate and rhythm; No murmurs, rubs, or gallops  GI: : Soft, Nontender, Nondistended; Bowel sounds present  NERVOUS SYSTEM:  Alert & Oriented X3  EXTREMITIES:  - edema  LYMPH: No lymphadenopathy noted  SKIN: No rashes or lesions  ENDOCRINOLOGY: No Thyromegaly  PSYCH: Appropriate    Labs:  22                            12.3   6.13  )-----------( 178      ( 22 Mar 2025 07:16 )             37.0                         11.9   5.97  )-----------( 174      ( 21 Mar 2025 06:56 )             35.5                         12.1   5.87  )-----------( 157      ( 20 Mar 2025 06:55 )             36.7     03-21    135  |  101  |  25[H]  ----------------------------<  88  4.0   |  19[L]  |  1.10  03-20    136  |  102  |  23  ----------------------------<  83  3.8   |  19[L]  |  1.09      TPro  7.2  /  Alb  3.7  /  TBili  0.4  /  DBili  x   /  AST  101[H]  /  ALT  7[L]  /  AlkPhos  87  03-20    CAPILLARY BLOOD GLUCOSE      POCT Blood Glucose.: 158 mg/dL (23 Mar 2025 11:35)  POCT Blood Glucose.: 101 mg/dL (23 Mar 2025 07:27)  POCT Blood Glucose.: 123 mg/dL (22 Mar 2025 16:17)      rad< from: CT Chest No Cont (03.20.25 @ 18:40) >  IMPRESSION:  Mild/moderate biapical consolidative airspace opacities with focal   bronchial dilatation, right greater than left.    There is mild bilateral lower lobe airspace infiltrate and/or   subsegmental atelectasis. No sara consolidation. Mild bilateral lower   lobe bronchiectasis.    Cholelithiasis. 1.3 cmgallstone region of gallbladder neck. No   pericholecystic inflammatory changes.    Please refer to detailed findings otherwise described above.    < end of copied text >              RECENT CULTURES:  03-19 @ 16:14 Clean Catch Clean Catch (Midstream)                Normal Urogenital pita present    03-19 @ 12:15 Blood Blood-Peripheral                No growth at 72 Hours    03-19 @ 12:00 Blood Blood-Peripheral                No growth at 72 Hours          RESPIRATORY CULTURES:          Studies  Chest X-RAY  CT SCAN Chest   Venous Dopplers: LE:   CT Abdomen  Others              
Patient is a 84y old  Male who presents with a chief complaint of lethargy  and SOB (20 Mar 2025 11:36)    Date of servie : 03-20-25 @ 13:40  INTERVAL HPI/OVERNIGHT EVENTS:  T(C): 36.7 (03-20-25 @ 05:18), Max: 37.4 (03-19-25 @ 14:32)  HR: 75 (03-20-25 @ 13:09) (68 - 83)  BP: 150/71 (03-20-25 @ 05:18) (112/56 - 150/71)  RR: 18 (03-20-25 @ 05:18) (16 - 19)  SpO2: 99% (03-20-25 @ 05:18) (98% - 99%)  Wt(kg): --  I&O's Summary      LABS:                        12.1   5.87  )-----------( 157      ( 20 Mar 2025 06:55 )             36.7     03-20    136  |  102  |  23  ----------------------------<  83  3.8   |  19[L]  |  1.09    Ca    9.2      20 Mar 2025 06:55  Mg     2.0     03-19    TPro  7.2  /  Alb  3.7  /  TBili  0.4  /  DBili  x   /  AST  101[H]  /  ALT  7[L]  /  AlkPhos  87  03-20    PT/INR - ( 19 Mar 2025 12:40 )   PT: 12.1 sec;   INR: 1.05 ratio         PTT - ( 19 Mar 2025 12:40 )  PTT:28.8 sec  Urinalysis Basic - ( 20 Mar 2025 06:55 )    Color: x / Appearance: x / SG: x / pH: x  Gluc: 83 mg/dL / Ketone: x  / Bili: x / Urobili: x   Blood: x / Protein: x / Nitrite: x   Leuk Esterase: x / RBC: x / WBC x   Sq Epi: x / Non Sq Epi: x / Bacteria: x      CAPILLARY BLOOD GLUCOSE      POCT Blood Glucose.: 111 mg/dL (20 Mar 2025 11:28)  POCT Blood Glucose.: 94 mg/dL (20 Mar 2025 07:28)  POCT Blood Glucose.: 104 mg/dL (19 Mar 2025 21:20)        Urinalysis Basic - ( 20 Mar 2025 06:55 )    Color: x / Appearance: x / SG: x / pH: x  Gluc: 83 mg/dL / Ketone: x  / Bili: x / Urobili: x   Blood: x / Protein: x / Nitrite: x   Leuk Esterase: x / RBC: x / WBC x   Sq Epi: x / Non Sq Epi: x / Bacteria: x        MEDICATIONS  (STANDING):  acetaminophen   IVPB .. 1000 milliGRAM(s) IV Intermittent once  albuterol/ipratropium for Nebulization 3 milliLiter(s) Nebulizer every 6 hours  artificial  tears Solution 1 Drop(s) Both EYES three times a day  atorvastatin 20 milliGRAM(s) Oral at bedtime  azithromycin  IVPB 500 milliGRAM(s) IV Intermittent every 24 hours  brimonidine 0.2% Ophthalmic Solution 1 Drop(s) Both EYES two times a day  carbidopa/levodopa  25/100 1 Tablet(s) Oral four times a day  cefTRIAXone   IVPB 1000 milliGRAM(s) IV Intermittent every 24 hours  dextrose 5%. 1000 milliLiter(s) (50 mL/Hr) IV Continuous <Continuous>  dextrose 5%. 1000 milliLiter(s) (100 mL/Hr) IV Continuous <Continuous>  dextrose 50% Injectable 25 Gram(s) IV Push once  dextrose 50% Injectable 12.5 Gram(s) IV Push once  dextrose 50% Injectable 25 Gram(s) IV Push once  dorzolamide 2%/timolol 0.5% Ophthalmic Solution 1 Drop(s) Both EYES two times a day  enoxaparin Injectable 40 milliGRAM(s) SubCutaneous every 24 hours  escitalopram 5 milliGRAM(s) Oral daily  gabapentin 100 milliGRAM(s) Oral three times a day  glucagon  Injectable 1 milliGRAM(s) IntraMuscular once  insulin lispro (ADMELOG) corrective regimen sliding scale   SubCutaneous three times a day before meals  latanoprost 0.005% Ophthalmic Solution 1 Drop(s) Both EYES at bedtime  lisinopril 40 milliGRAM(s) Oral daily  oseltamivir 30 milliGRAM(s) Oral two times a day  tamsulosin 0.4 milliGRAM(s) Oral at bedtime  traZODone 150 milliGRAM(s) Oral daily    MEDICATIONS  (PRN):  dextrose Oral Gel 15 Gram(s) Oral once PRN Blood Glucose LESS THAN 70 milliGRAM(s)/deciliter          PHYSICAL EXAM:  GENERAL: NAD, well-groomed, well-developed  HEAD:  Atraumatic, Normocephalic  CHEST/LUNG: Clear to percussion bilaterally; No rales, rhonchi, wheezing, or rubs  HEART: Regular rate and rhythm; No murmurs, rubs, or gallops  ABDOMEN: Soft, Nontender, Nondistended; Bowel sounds present  EXTREMITIES:  2+ Peripheral Pulses, No clubbing, cyanosis, or edema  LYMPH: No lymphadenopathy noted  SKIN: No rashes or lesions    Care Discussed with Consultants/Other Providers [ ] YES  [ ] NO
Patient is a 84y old  Male who presents with a chief complaint of lethargy  and SOB (22 Mar 2025 14:40)    Date of servie : 03-22-25 @ 15:33  INTERVAL HPI/OVERNIGHT EVENTS:  T(C): 36.6 (03-22-25 @ 11:10), Max: 36.9 (03-21-25 @ 20:24)  HR: 76 (03-22-25 @ 11:10) (75 - 77)  BP: 152/70 (03-22-25 @ 11:10) (138/78 - 152/70)  RR: 18 (03-22-25 @ 11:10) (18 - 18)  SpO2: 98% (03-22-25 @ 11:10) (96% - 98%)  Wt(kg): --  I&O's Summary    21 Mar 2025 07:01  -  22 Mar 2025 07:00  --------------------------------------------------------  IN: 240 mL / OUT: 350 mL / NET: -110 mL    22 Mar 2025 07:01  -  22 Mar 2025 15:33  --------------------------------------------------------  IN: 0 mL / OUT: 600 mL / NET: -600 mL        LABS:                        12.3   6.13  )-----------( 178      ( 22 Mar 2025 07:16 )             37.0     03-21    135  |  101  |  25[H]  ----------------------------<  88  4.0   |  19[L]  |  1.10    Ca    9.2      21 Mar 2025 06:56        Urinalysis Basic - ( 21 Mar 2025 06:56 )    Color: x / Appearance: x / SG: x / pH: x  Gluc: 88 mg/dL / Ketone: x  / Bili: x / Urobili: x   Blood: x / Protein: x / Nitrite: x   Leuk Esterase: x / RBC: x / WBC x   Sq Epi: x / Non Sq Epi: x / Bacteria: x      CAPILLARY BLOOD GLUCOSE      POCT Blood Glucose.: 110 mg/dL (22 Mar 2025 11:10)  POCT Blood Glucose.: 97 mg/dL (22 Mar 2025 07:35)  POCT Blood Glucose.: 107 mg/dL (21 Mar 2025 21:24)  POCT Blood Glucose.: 132 mg/dL (21 Mar 2025 16:17)        Urinalysis Basic - ( 21 Mar 2025 06:56 )    Color: x / Appearance: x / SG: x / pH: x  Gluc: 88 mg/dL / Ketone: x  / Bili: x / Urobili: x   Blood: x / Protein: x / Nitrite: x   Leuk Esterase: x / RBC: x / WBC x   Sq Epi: x / Non Sq Epi: x / Bacteria: x        MEDICATIONS  (STANDING):  acetaminophen   IVPB .. 1000 milliGRAM(s) IV Intermittent once  albuterol/ipratropium for Nebulization 3 milliLiter(s) Nebulizer every 6 hours  artificial  tears Solution 1 Drop(s) Both EYES three times a day  atorvastatin 20 milliGRAM(s) Oral at bedtime  brimonidine 0.2% Ophthalmic Solution 1 Drop(s) Both EYES two times a day  carbidopa/levodopa  25/100 1 Tablet(s) Oral four times a day  cefTRIAXone   IVPB 1000 milliGRAM(s) IV Intermittent every 24 hours  dextrose 5%. 1000 milliLiter(s) (50 mL/Hr) IV Continuous <Continuous>  dextrose 5%. 1000 milliLiter(s) (100 mL/Hr) IV Continuous <Continuous>  dextrose 50% Injectable 25 Gram(s) IV Push once  dextrose 50% Injectable 12.5 Gram(s) IV Push once  dextrose 50% Injectable 25 Gram(s) IV Push once  dorzolamide 2%/timolol 0.5% Ophthalmic Solution 1 Drop(s) Both EYES two times a day  enoxaparin Injectable 40 milliGRAM(s) SubCutaneous every 24 hours  escitalopram 5 milliGRAM(s) Oral daily  fluticasone propionate/ salmeterol 250-50 MICROgram(s) Diskus 1 Dose(s) Inhalation two times a day  gabapentin 100 milliGRAM(s) Oral three times a day  glucagon  Injectable 1 milliGRAM(s) IntraMuscular once  insulin lispro (ADMELOG) corrective regimen sliding scale   SubCutaneous three times a day before meals  latanoprost 0.005% Ophthalmic Solution 1 Drop(s) Both EYES at bedtime  lisinopril 40 milliGRAM(s) Oral daily  oseltamivir 30 milliGRAM(s) Oral two times a day  tamsulosin 0.4 milliGRAM(s) Oral at bedtime  traZODone 150 milliGRAM(s) Oral daily    MEDICATIONS  (PRN):  dextrose Oral Gel 15 Gram(s) Oral once PRN Blood Glucose LESS THAN 70 milliGRAM(s)/deciliter          PHYSICAL EXAM:  GENERAL: frail  HEAD:  Atraumatic, Normocephalic  CHEST/LUNG: crackles +  HEART: Regular rate and rhythm; No murmurs, rubs, or gallops  ABDOMEN: Soft, Nontender, Nondistended; Bowel sounds present  EXTREMITIES:  edema +    Care Discussed with Consultants/Other Providers [x ] YES  [ ] NO

## 2025-03-26 ENCOUNTER — APPOINTMENT (OUTPATIENT)
Dept: PULMONOLOGY | Facility: CLINIC | Age: 85
End: 2025-03-26
Payer: MEDICARE

## 2025-03-26 PROBLEM — J10.1 INFLUENZA A: Status: ACTIVE | Noted: 2025-03-26

## 2025-03-26 PROBLEM — Z00.00 ENCOUNTER FOR PREVENTIVE HEALTH EXAMINATION: Status: ACTIVE | Noted: 2025-03-26

## 2025-03-26 PROBLEM — J47.9 ADULT BRONCHIECTASIS: Status: ACTIVE | Noted: 2025-03-26

## 2025-03-26 PROBLEM — R91.8 ABNORMAL CT SCAN, LUNG: Status: ACTIVE | Noted: 2025-03-26

## 2025-03-26 PROCEDURE — 99213 OFFICE O/P EST LOW 20 MIN: CPT | Mod: 95

## 2025-03-26 PROCEDURE — 99496 TRANSJ CARE MGMT HIGH F2F 7D: CPT | Mod: 2W

## 2025-04-01 ENCOUNTER — APPOINTMENT (OUTPATIENT)
Dept: PULMONOLOGY | Facility: CLINIC | Age: 85
End: 2025-04-01

## 2025-04-01 ENCOUNTER — NON-APPOINTMENT (OUTPATIENT)
Age: 85
End: 2025-04-01

## 2025-04-01 PROCEDURE — 99204 OFFICE O/P NEW MOD 45 MIN: CPT

## 2025-04-21 PROBLEM — Z86.39 HISTORY OF TYPE 2 DIABETES MELLITUS: Status: RESOLVED | Noted: 2025-04-21 | Resolved: 2025-04-21

## 2025-04-21 PROBLEM — I10 HTN (HYPERTENSION), BENIGN: Status: RESOLVED | Noted: 2025-04-21 | Resolved: 2025-04-21

## 2025-04-21 PROBLEM — Z78.9 NON-SMOKER: Status: ACTIVE | Noted: 2025-04-21

## 2025-04-21 PROBLEM — Z86.39 HISTORY OF HYPERCHOLESTEROLEMIA: Status: RESOLVED | Noted: 2025-04-21 | Resolved: 2025-04-21

## 2025-04-21 PROBLEM — Z86.69 HISTORY OF PARKINSON'S DISEASE: Status: RESOLVED | Noted: 2025-04-21 | Resolved: 2025-04-21

## 2025-04-22 ENCOUNTER — APPOINTMENT (OUTPATIENT)
Dept: PULMONOLOGY | Facility: CLINIC | Age: 85
End: 2025-04-22